# Patient Record
Sex: FEMALE | Race: WHITE | NOT HISPANIC OR LATINO | Employment: OTHER | URBAN - NONMETROPOLITAN AREA
[De-identification: names, ages, dates, MRNs, and addresses within clinical notes are randomized per-mention and may not be internally consistent; named-entity substitution may affect disease eponyms.]

---

## 2017-11-13 ENCOUNTER — APPOINTMENT (OUTPATIENT)
Dept: GENERAL RADIOLOGY | Facility: HOSPITAL | Age: 77
End: 2017-11-13
Attending: EMERGENCY MEDICINE
Payer: MEDICARE

## 2017-11-13 ENCOUNTER — TRANSFERRED RECORDS (OUTPATIENT)
Dept: HEALTH INFORMATION MANAGEMENT | Facility: HOSPITAL | Age: 77
End: 2017-11-13

## 2017-11-13 ENCOUNTER — HOSPITAL ENCOUNTER (EMERGENCY)
Facility: HOSPITAL | Age: 77
Discharge: HOME | End: 2017-11-13
Attending: EMERGENCY MEDICINE | Admitting: EMERGENCY MEDICINE
Payer: MEDICARE

## 2017-11-13 ENCOUNTER — APPOINTMENT (OUTPATIENT)
Dept: CT IMAGING | Facility: HOSPITAL | Age: 77
End: 2017-11-13
Attending: EMERGENCY MEDICINE
Payer: MEDICARE

## 2017-11-13 VITALS
SYSTOLIC BLOOD PRESSURE: 92 MMHG | TEMPERATURE: 97.6 F | RESPIRATION RATE: 17 BRPM | OXYGEN SATURATION: 97 % | HEART RATE: 97 BPM | DIASTOLIC BLOOD PRESSURE: 63 MMHG

## 2017-11-13 DIAGNOSIS — I48.91 ATRIAL FIBRILLATION, UNSPECIFIED TYPE (H): ICD-10-CM
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
## 2017-11-13 LAB
ALBUMIN SERPL-MCNC: 3 G/DL (ref 3.4–5)
ALBUMIN UR-MCNC: NEGATIVE MG/DL
ALP SERPL-CCNC: 83 U/L (ref 40–150)
ALT SERPL W P-5'-P-CCNC: 46 U/L (ref 0–50)
ANION GAP SERPL CALCULATED.3IONS-SCNC: 6 MMOL/L (ref 3–14)
APPEARANCE UR: CLEAR
AST SERPL W P-5'-P-CCNC: 29 U/L (ref 0–45)
BACTERIA #/AREA URNS HPF: ABNORMAL /HPF
BASOPHILS # BLD AUTO: 0 10E9/L (ref 0–0.2)
BASOPHILS NFR BLD AUTO: 0.4 %
BILIRUB SERPL-MCNC: 0.3 MG/DL (ref 0.2–1.3)
BILIRUB UR QL STRIP: NEGATIVE
BUN SERPL-MCNC: 13 MG/DL (ref 7–30)
CALCIUM SERPL-MCNC: 8.3 MG/DL (ref 8.5–10.1)
CHLORIDE SERPL-SCNC: 107 MMOL/L (ref 94–109)
CO2 SERPL-SCNC: 30 MMOL/L (ref 20–32)
COLOR UR AUTO: ABNORMAL
CREAT SERPL-MCNC: 0.76 MG/DL (ref 0.52–1.04)
DIFFERENTIAL METHOD BLD: NORMAL
EOSINOPHIL # BLD AUTO: 0.1 10E9/L (ref 0–0.7)
EOSINOPHIL NFR BLD AUTO: 2.8 %
ERYTHROCYTE [DISTWIDTH] IN BLOOD BY AUTOMATED COUNT: 13.5 % (ref 10–15)
GFR SERPL CREATININE-BSD FRML MDRD: 74 ML/MIN/1.7M2
GLUCOSE SERPL-MCNC: 83 MG/DL (ref 70–99)
GLUCOSE UR STRIP-MCNC: NEGATIVE MG/DL
HCT VFR BLD AUTO: 43 % (ref 35–47)
HGB BLD-MCNC: 14.7 G/DL (ref 11.7–15.7)
HGB UR QL STRIP: ABNORMAL
IMM GRANULOCYTES # BLD: 0 10E9/L (ref 0–0.4)
IMM GRANULOCYTES NFR BLD: 0.2 %
KETONES UR STRIP-MCNC: NEGATIVE MG/DL
LEUKOCYTE ESTERASE UR QL STRIP: NEGATIVE
LYMPHOCYTES # BLD AUTO: 1.6 10E9/L (ref 0.8–5.3)
LYMPHOCYTES NFR BLD AUTO: 32.1 %
MCH RBC QN AUTO: 30.3 PG (ref 26.5–33)
MCHC RBC AUTO-ENTMCNC: 34.2 G/DL (ref 31.5–36.5)
MCV RBC AUTO: 89 FL (ref 78–100)
MONOCYTES # BLD AUTO: 0.3 10E9/L (ref 0–1.3)
MONOCYTES NFR BLD AUTO: 6.7 %
NEUTROPHILS # BLD AUTO: 2.8 10E9/L (ref 1.6–8.3)
NEUTROPHILS NFR BLD AUTO: 57.8 %
NITRATE UR QL: NEGATIVE
NRBC # BLD AUTO: 0 10*3/UL
NRBC BLD AUTO-RTO: 0 /100
PH UR STRIP: 7 PH (ref 4.7–8)
PLATELET # BLD AUTO: 190 10E9/L (ref 150–450)
POTASSIUM SERPL-SCNC: 3.6 MMOL/L (ref 3.4–5.3)
PROT SERPL-MCNC: 7.3 G/DL (ref 6.8–8.8)
RBC # BLD AUTO: 4.85 10E12/L (ref 3.8–5.2)
RBC #/AREA URNS AUTO: 3 /HPF (ref 0–2)
SODIUM SERPL-SCNC: 143 MMOL/L (ref 133–144)
SOURCE: ABNORMAL
SP GR UR STRIP: 1 (ref 1–1.03)
TROPONIN I SERPL-MCNC: <0.015 UG/L (ref 0–0.04)
UROBILINOGEN UR STRIP-MCNC: NORMAL MG/DL (ref 0–2)
WBC # BLD AUTO: 4.9 10E9/L (ref 4–11)
WBC #/AREA URNS AUTO: <1 /HPF (ref 0–2)

## 2017-11-13 PROCEDURE — 73562 X-RAY EXAM OF KNEE 3: CPT | Mod: TC,RT

## 2017-11-13 PROCEDURE — 81001 URINALYSIS AUTO W/SCOPE: CPT | Performed by: EMERGENCY MEDICINE

## 2017-11-13 PROCEDURE — 93010 ELECTROCARDIOGRAM REPORT: CPT | Performed by: INTERNAL MEDICINE

## 2017-11-13 PROCEDURE — 84484 ASSAY OF TROPONIN QUANT: CPT | Performed by: EMERGENCY MEDICINE

## 2017-11-13 PROCEDURE — 93005 ELECTROCARDIOGRAM TRACING: CPT

## 2017-11-13 PROCEDURE — 99285 EMERGENCY DEPT VISIT HI MDM: CPT | Mod: 25

## 2017-11-13 PROCEDURE — 71010 XR CHEST 1 VW: CPT | Mod: TC

## 2017-11-13 PROCEDURE — 80053 COMPREHEN METABOLIC PANEL: CPT | Performed by: EMERGENCY MEDICINE

## 2017-11-13 PROCEDURE — 72170 X-RAY EXAM OF PELVIS: CPT | Mod: TC

## 2017-11-13 PROCEDURE — 70450 CT HEAD/BRAIN W/O DYE: CPT | Mod: TC

## 2017-11-13 PROCEDURE — 85025 COMPLETE CBC W/AUTO DIFF WBC: CPT | Performed by: EMERGENCY MEDICINE

## 2017-11-13 PROCEDURE — 36415 COLL VENOUS BLD VENIPUNCTURE: CPT | Performed by: EMERGENCY MEDICINE

## 2017-11-13 PROCEDURE — 99283 EMERGENCY DEPT VISIT LOW MDM: CPT | Performed by: EMERGENCY MEDICINE

## 2017-11-13 ASSESSMENT — ENCOUNTER SYMPTOMS
ABDOMINAL PAIN: 0
SHORTNESS OF BREATH: 0
FEVER: 0

## 2017-11-13 NOTE — ED AVS SNAPSHOT
[facility]    [address] [place] [address]    Phone:  [phone]                                       Danette Ramos   MRN: [id number]    Department:  [facility]   Date of Visit:  11/13/2017           After Visit Summary Signature Page     I have received my discharge instructions, and my questions have been answered. I have discussed any challenges I see with this plan with the nurse or doctor.    ..........................................................................................................................................  Patient/Patient Representative Signature      ..........................................................................................................................................  Patient Representative Print Name and Relationship to Patient    ..................................................               ................................................  Date                                            Time    ..........................................................................................................................................  Reviewed by Signature/Title    ...................................................              ..............................................  Date                                                            Time

## 2017-11-13 NOTE — ED AVS SNAPSHOT
[facility]    [address]    [place] [address]    Phone:  [phone]                                       Danette Ramos   MRN: [id number]    Department:  [facility]   Date of Visit:  11/13/2017           Patient Information     Date Of Birth          1940        Your diagnoses for this visit were:     Fall, initial encounter     Atrial fibrillation, unspecified type (H)        You were seen by Emile Sutherland MD.      Follow-up Information     Follow up with No Ref-Primary, Physician In 2 days.    Why:  Re-evaluation, Continuity of care    Contact information:    NO REF-PRIMARY PHYSICIAN          Discharge Instructions         Discharge Instructions for Atrial Fibrillation  You have been diagnosed with an abnormal heart rhythm called atrial fibrillation. With this condition, your heart s 2 upper chambers quiver rather than squeeze the blood out in a normal pattern. This leads to an irregular and sometimes rapid heartbeat. Some people will develop associated symptoms such as a flip-flopping heartbeat, chest pain, lightheadedness, or shortness of breath. Other people may have no symptoms at all. Atrial fibrillation is serious because it affects the heart s ability to fill with blood as it should. Blood clots may form. This increases the risk for stroke. Untreated atrial fibrillation can also lead to heart failure. Atrial fibrillation can be controlled. With treatment, most people with atrial fibrillation lead normal lives.  Treatment options  Recommended treatment for atrial fibrillation depends on your age, symptoms, how long you have had atrial fibrillation, and other factors. You will have a complete evaluation to find out if you have any abnormalities that caused your heart to go into atrial fibrillation. This might be blocked heart arteries or a thyroid problem. Your doctor will assess your particular case and discuss choices with you.  Treatment choices may  include:    Treating an underlying disorder that puts you at risk for atrial fibrillation. For example, correcting an abnormal thyroid or electrolyte problem, or treating a blocked heart artery.    Restoring a normal heart rhythm with an electrical shock (cardioversion) or with an antiarrhythmic medicine (chemical cardioversion)    Using medicine to control your heart rate in atrial fibrillation.    Preventing the risk for blood clot and stroke using blood-thinning medicines. Your doctor will tell you what he or she recommends. Choices may include aspirin, clopidogrel, warfarin, dabigatran, rivaroxaban, apixaban, and edoxaban.    Doing catheter ablation or a surgical maze procedure. These use different methods to destroy certain areas of heart tissue. This interrupts the electrical signals causing atrial fibrillation. One of these procedures may be a choice when medicines do not work, or as an alternative to long-term medicine.    Other treatment choices may be recommended for you by your doctor.  Managing risk factors for stroke and preventing heart failure are important parts of any treatment plan for atrial fibrillation.  Home care    Take your medicines exactly as directed. Don t skip doses.    Work with your doctor to find the right medicines and doses for you.    Learn to take your own pulse. Keep a record of your results. Ask your doctor which pulse rates mean that you need medical attention. Slowing your pulse is often the goal of treatment. Ask your doctor if it s OK for you to use an automatic machine to check your pulse at home. Sometimes these machines don t count the pulse correctly when you have atrial fibrillation.    Limit your intake of coffee, tea, cola, and other beverages with caffeine. Talk with your doctor about whether you should eliminate caffeine.    Avoid over-the-counter medicines that have caffeine in them.    Let your doctor know what medicines you take, including prescription and  over-the-counter medicines, as well as any supplements. They interfere with some medicines given for atrial fibrillation.    Ask your doctor about whether you can drink alcohol. Some people need to avoid alcohol to better treat atrial fibrillation. If you are taking blood-thinner medicines, alcohol may interfere with them by increasing their effect.    Never take stimulants such as amphetamines or cocaine. These drugs can speed up your heart rate and trigger atrial fibrillation.  Follow-up care  Follow up with your doctor, or as advised.     When should I call my healthcare provider  Call your healthcare provider right away if you have any of the following:    Weakness    Dizziness    Fainting    Fatigue    Shortness of breath    Chest pain with increased activity    A change in the usual regularity of your heartbeat, or an unusually fast heartbeat   Date Last Reviewed: 4/23/2016 2000-2017 [employer]. [address] [place] [address], [place] [address]. All rights reserved. This information is not intended as a substitute for professional medical care. Always follow your healthcare professional's instructions.          Preventing Falls: Are You At Risk of Falling?     Ask for help to reduce risk of falling in your home.     As you get older, you're not as steady on your feet as you once were. And you may have health problems you didn't have when you were younger. So, it's not surprising that older people are more likely to trip and fall. Falling can be very serious. It can change your overall health and quality of life. That's why it's important to be aware of your own risk of falling.  The dangers of falling  Falls are one of the main causes of injury in people over age 65. An older person who falls may take longer to get better than a younger person. And, after a fall, an older person is more likely to have problems that don't go away. So, preventing falls can help you avoid serious health problems.  Are  "you at risk of falling?  Answer these questions to rate your level of risk.    Are you a woman?    Have you fallen or stumbled in the last year?    Are you over age 65?    Are you ever dizzy or lightheaded with standing?    Do you have a hard time getting in and out of the bathtub or on and off the toilet?    Do you lean on objects to help you get around? Or do you use a cane or walker?    Do you have vision or hearing problems? For example, do you need new glasses or hearing aids?    Do you have 2 or more long-lasting (chronic) medical conditions?    Do you take 3 or more medicines?    Have you felt depressed recently?    Have you had more trouble with your memory in recent months?    Are there hazards in your home that might cause you to fall, such as loose rugs or poor lighting?    Do you have a pet that jumps on you or might trip you?    Have you stopped getting regular exercise?    Do you have diabetes?     Do you have a neurologic disease, such as Parkinson or Alzheimer disease?     Do you drink alcohol?    Do you wear athletic shoes or slippers, or go barefoot at home?  You can help prevent falls  If you answered \"yes\" to any of the above questions, take steps to reduce your risk of a fall. Monitoring health conditions and keeping walkways in your home free of clutter are just two ways. Changing is sometimes easier said than done. But keep in mind that even small changes can make you less likely to fall.  The fear of falling  It's normal to be scared of falling, especially if you've fallen before. But being afraid can actually make you more likely to fall. This is because:    Fear might cause you to become less active. Being less active can lead to a loss of strength and balance.    Fear can lead to isolation from others, depression, or the use of more medicines or alcohol. And all these things make falling even more likely.  To break the cycle, learn more about ways to avoid falling. As you take control, you " "may find yourself feeling less afraid.   Date Last Reviewed: 5/1/2017 2000-2017 [employer]. [address] [place] [address], [place] [address]. All rights reserved. This information is not intended as a substitute for professional medical care. Always follow your healthcare professional's instructions.             Review of your medicines      Notice     You have not been prescribed any medications.            Procedures and tests performed during your visit     CBC with platelets differential    Comprehensive metabolic panel    EKG 12 lead    Head CT w/o contrast    Pelvis XR, 1-2 views    Troponin I    UA reflex to Microscopic and Culture    XR Chest 1 View    XR Knee Right 3 Views      Orders Needing Specimen Collection     None      Pending Results     Date and Time Order Name Status Description    11/13/2017 0447 XR Chest 1 View In process     11/13/2017 0447 Head CT w/o contrast In process     11/13/2017 0446 XR Knee Right 3 Views In process     11/13/2017 0446 Pelvis XR, 1-2 views In process             Pending Culture Results     No orders found from 11/11/2017 to 11/14/2017.            Thank you for choosing [facility]       Thank you for choosing [facility] for your care. Our goal is always to provide you with excellent care. Hearing back from our patients is one way we can continue to improve our services. Please take a few minutes to complete the written survey that you may receive in the mail after you visit with us. Thank you!        [employer] Information     [employer] lets you send messages to your doctor, view your test results, renew your prescriptions, schedule appointments and more. To sign up, go to [email or web address] . Click on \"Log in\" on the left side of the screen, which will take you to the Welcome page. Then click on \"Sign up Now\" on the right side of the page.     You will be asked to enter the access code listed below, as well as some personal information. Please follow the " directions to create your username and password.     Your access code is: [email or web address]  Expires: 2018  7:21 AM     Your access code will  in 90 days. If you need help or a new code, please call your [facility] clinic or [phone].        Care EveryWhere ID     This is your Care EveryWhere ID. This could be used by other organizations to access your [facility] medical records  [id number]        Equal Access to Services     DIAN MARCUS : Hadii aad ku hadasho Soomaali, waaxda luqadaha, qaybta kaalmada adeegyada, carter yost . So [facility] [phone].    ATENCIÓN: Si habla español, tiene a prado disposición servicios gratuitos de asistencia lingüística. Llame al [phone].    We comply with applicable federal civil rights laws and [place] laws. We do not discriminate on the basis of race, color, national origin, age, disability, sex, sexual orientation, or gender identity.            After Visit Summary       This is your record. Keep this with you and show to your community pharmacist(s) and doctor(s) at your next visit.

## 2017-11-13 NOTE — ED NOTES
Arrived to ED room 4 via [facility]. EMS reports patient was found on the floor next to her bed around 0400 and was not seen prior to that since 2200 so unsure how long she was on the floor. Patient denies any pain. 4 cm diameter red non blanchable area on right knee. Hx of dementia and stroke. EMS reports patient is responding per her usual. Call light within reach.

## 2017-11-13 NOTE — ED PROVIDER NOTES
History     Chief Complaint   Patient presents with     Fall     found on floor at 0400, last known well time 2200     HPI  Danette Ramos is a 77 year old female who Presents to the ED via EMS.  Patient was apparently found on the floor next to her bed.  She was last seen at 10:00 tonight.  She does not recall what happened.  Presumed that patient had fallen to the floor for unknown duration.  She denies pain anywhere on the body.  She has history of dementia and prior stroke.  No other complaints today.    Problem List:    There are no active problems to display for this patient.       Past Medical History:    No past medical history on file.    Past Surgical History:    No past surgical history on file.    Family History:    No family history on file.    Social History:  Marital Status:   [2]  Social History   Substance Use Topics     Smoking status: Not on file     Smokeless tobacco: Not on file     Alcohol use Not on file        Medications:      No current outpatient prescriptions on file.      Review of Systems   Constitutional: Negative for fever.   Respiratory: Negative for shortness of breath.    Cardiovascular: Negative for chest pain.   Gastrointestinal: Negative for abdominal pain.   All other systems reviewed and are negative.      Physical Exam   BP: 118/91  Pulse: 97  Heart Rate: 92  Temp: 96.2  F (35.7  C)  Resp: 17  SpO2: 96 %      Physical Exam   Constitutional: She appears well-developed and well-nourished. No distress.   HENT:   Head: Normocephalic and atraumatic.   Mouth/Throat: Oropharynx is clear and moist. No oropharyngeal exudate.   Eyes: Pupils are equal, round, and reactive to light. No scleral icterus.   Cardiovascular: Normal rate, regular rhythm, normal heart sounds and intact distal pulses.    Pulmonary/Chest: Effort normal and breath sounds normal. No respiratory distress.   Abdominal: Soft. Bowel sounds are normal. There is no tenderness.   Musculoskeletal: She exhibits no  edema or tenderness.   Neurological: She is alert.   Skin: Skin is warm. No rash noted. She is not diaphoretic.   Nursing note and vitals reviewed.      ED Course   Patient evaluated and laboratory tests ordered.  X-ray of the pelvis, right knee ordered.  CT head ordered.  Chest x-ray ordered.    ED Course     Procedures               EKG Interpretation:      Interpreted by Emile Sutherland  Time reviewed: 5:00 AM  Symptoms at time of EKG: none   Rhythm: a' fib   Rate: 118  Axis: normal  Ectopy: none  Conduction: normal  ST Segments/ T Waves: No ST-T wave changes  Q Waves: none  Comparison to prior: No old EKG available    Clinical Impression: normal EKG               Labs Ordered and Resulted from Time of ED Arrival Up to the Time of Departure from the ED   UA MACROSCOPIC WITH REFLEX TO MICRO AND CULTURE - Abnormal; Notable for the following:        Result Value    Blood Urine Trace (*)     RBC Urine 3 (*)     Bacteria Urine None (*)     All other components within normal limits   COMPREHENSIVE METABOLIC PANEL - Abnormal; Notable for the following:     Calcium 8.3 (*)     Albumin 3.0 (*)     All other components within normal limits   CBC WITH PLATELETS DIFFERENTIAL   TROPONIN I       Assessments & Plan (with Medical Decision Making)   Fall initial encounter: Normal view of the emergency department and CT scan shows an old infarct.  No acute changes on chest x-ray, right knee x-ray and pelvic x-ray. EKG showed atrial fibrillation which is not documented in the patient's chart.  Normal CBC, CMP and a urinalysis shows trace blood.  In view of no changes on CT scan and normal evaluation of the ED patient was discharged back to the group home.  Advised follow-up with PCP.    I have reviewed the nursing notes.    I have reviewed the findings, diagnosis, plan and need for follow up with the patient.    New Prescriptions    No medications on file       Final diagnoses:   Fall, initial encounter   Atrial fibrillation,  unspecified type (H)       11/13/2017   [facility]     Emile Sutherland MD  11/13/17 0725       Emile Sutherland MD  11/13/17 0725

## 2017-11-13 NOTE — PROGRESS NOTES
Head CT w/o contrast -  IMPRESSION: Chronic appearing infarct involving the left occipital and left parietal lobe with some associated dilatation of the ventricle. There is also generalized atrophy with small vessel ischemic change but no acute mass effect or hemorrhage.- discharged back to the group home.  Advised follow-up with PCP.  Report routed to PCP, Dr. ASHLEY Taylor.

## 2017-11-13 NOTE — DISCHARGE INSTRUCTIONS
Discharge Instructions for Atrial Fibrillation  You have been diagnosed with an abnormal heart rhythm called atrial fibrillation. With this condition, your heart s 2 upper chambers quiver rather than squeeze the blood out in a normal pattern. This leads to an irregular and sometimes rapid heartbeat. Some people will develop associated symptoms such as a flip-flopping heartbeat, chest pain, lightheadedness, or shortness of breath. Other people may have no symptoms at all. Atrial fibrillation is serious because it affects the heart s ability to fill with blood as it should. Blood clots may form. This increases the risk for stroke. Untreated atrial fibrillation can also lead to heart failure. Atrial fibrillation can be controlled. With treatment, most people with atrial fibrillation lead normal lives.  Treatment options  Recommended treatment for atrial fibrillation depends on your age, symptoms, how long you have had atrial fibrillation, and other factors. You will have a complete evaluation to find out if you have any abnormalities that caused your heart to go into atrial fibrillation. This might be blocked heart arteries or a thyroid problem. Your doctor will assess your particular case and discuss choices with you.  Treatment choices may include:    Treating an underlying disorder that puts you at risk for atrial fibrillation. For example, correcting an abnormal thyroid or electrolyte problem, or treating a blocked heart artery.    Restoring a normal heart rhythm with an electrical shock (cardioversion) or with an antiarrhythmic medicine (chemical cardioversion)    Using medicine to control your heart rate in atrial fibrillation.    Preventing the risk for blood clot and stroke using blood-thinning medicines. Your doctor will tell you what he or she recommends. Choices may include aspirin, clopidogrel, warfarin, dabigatran, rivaroxaban, apixaban, and edoxaban.    Doing catheter ablation or a surgical maze  procedure. These use different methods to destroy certain areas of heart tissue. This interrupts the electrical signals causing atrial fibrillation. One of these procedures may be a choice when medicines do not work, or as an alternative to long-term medicine.    Other treatment choices may be recommended for you by your doctor.  Managing risk factors for stroke and preventing heart failure are important parts of any treatment plan for atrial fibrillation.  Home care    Take your medicines exactly as directed. Don t skip doses.    Work with your doctor to find the right medicines and doses for you.    Learn to take your own pulse. Keep a record of your results. Ask your doctor which pulse rates mean that you need medical attention. Slowing your pulse is often the goal of treatment. Ask your doctor if it s OK for you to use an automatic machine to check your pulse at home. Sometimes these machines don t count the pulse correctly when you have atrial fibrillation.    Limit your intake of coffee, tea, cola, and other beverages with caffeine. Talk with your doctor about whether you should eliminate caffeine.    Avoid over-the-counter medicines that have caffeine in them.    Let your doctor know what medicines you take, including prescription and over-the-counter medicines, as well as any supplements. They interfere with some medicines given for atrial fibrillation.    Ask your doctor about whether you can drink alcohol. Some people need to avoid alcohol to better treat atrial fibrillation. If you are taking blood-thinner medicines, alcohol may interfere with them by increasing their effect.    Never take stimulants such as amphetamines or cocaine. These drugs can speed up your heart rate and trigger atrial fibrillation.  Follow-up care  Follow up with your doctor, or as advised.     When should I call my healthcare provider  Call your healthcare provider right away if you have any of the  following:    Weakness    Dizziness    Fainting    Fatigue    Shortness of breath    Chest pain with increased activity    A change in the usual regularity of your heartbeat, or an unusually fast heartbeat   Date Last Reviewed: 4/23/2016 2000-2017 [employer]. [address] [place] [address], [place] [address]. All rights reserved. This information is not intended as a substitute for professional medical care. Always follow your healthcare professional's instructions.          Preventing Falls: Are You At Risk of Falling?     Ask for help to reduce risk of falling in your home.     As you get older, you're not as steady on your feet as you once were. And you may have health problems you didn't have when you were younger. So, it's not surprising that older people are more likely to trip and fall. Falling can be very serious. It can change your overall health and quality of life. That's why it's important to be aware of your own risk of falling.  The dangers of falling  Falls are one of the main causes of injury in people over age 65. An older person who falls may take longer to get better than a younger person. And, after a fall, an older person is more likely to have problems that don't go away. So, preventing falls can help you avoid serious health problems.  Are you at risk of falling?  Answer these questions to rate your level of risk.    Are you a woman?    Have you fallen or stumbled in the last year?    Are you over age 65?    Are you ever dizzy or lightheaded with standing?    Do you have a hard time getting in and out of the bathtub or on and off the toilet?    Do you lean on objects to help you get around? Or do you use a cane or walker?    Do you have vision or hearing problems? For example, do you need new glasses or hearing aids?    Do you have 2 or more long-lasting (chronic) medical conditions?    Do you take 3 or more medicines?    Have you felt depressed recently?    Have you had more trouble  "with your memory in recent months?    Are there hazards in your home that might cause you to fall, such as loose rugs or poor lighting?    Do you have a pet that jumps on you or might trip you?    Have you stopped getting regular exercise?    Do you have diabetes?     Do you have a neurologic disease, such as Parkinson or Alzheimer disease?     Do you drink alcohol?    Do you wear athletic shoes or slippers, or go barefoot at home?  You can help prevent falls  If you answered \"yes\" to any of the above questions, take steps to reduce your risk of a fall. Monitoring health conditions and keeping walkways in your home free of clutter are just two ways. Changing is sometimes easier said than done. But keep in mind that even small changes can make you less likely to fall.  The fear of falling  It's normal to be scared of falling, especially if you've fallen before. But being afraid can actually make you more likely to fall. This is because:    Fear might cause you to become less active. Being less active can lead to a loss of strength and balance.    Fear can lead to isolation from others, depression, or the use of more medicines or alcohol. And all these things make falling even more likely.  To break the cycle, learn more about ways to avoid falling. As you take control, you may find yourself feeling less afraid.   Date Last Reviewed: 5/1/2017 2000-2017 [employer]. [address] [place] [address], [place] [address]. All rights reserved. This information is not intended as a substitute for professional medical care. Always follow your healthcare professional's instructions.        "

## 2017-11-24 ENCOUNTER — APPOINTMENT (OUTPATIENT)
Dept: CT IMAGING | Facility: HOSPITAL | Age: 77
End: 2017-11-24
Attending: FAMILY MEDICINE
Payer: MEDICARE

## 2017-11-24 ENCOUNTER — APPOINTMENT (OUTPATIENT)
Dept: GENERAL RADIOLOGY | Facility: HOSPITAL | Age: 77
End: 2017-11-24
Attending: FAMILY MEDICINE
Payer: MEDICARE

## 2017-11-24 ENCOUNTER — HOSPITAL ENCOUNTER (EMERGENCY)
Facility: HOSPITAL | Age: 77
Discharge: HOME | End: 2017-11-24
Attending: FAMILY MEDICINE | Admitting: FAMILY MEDICINE
Payer: MEDICARE

## 2017-11-24 ENCOUNTER — MEDICAL CORRESPONDENCE (OUTPATIENT)
Dept: HEALTH INFORMATION MANAGEMENT | Facility: HOSPITAL | Age: 77
End: 2017-11-24

## 2017-11-24 VITALS
OXYGEN SATURATION: 96 % | TEMPERATURE: 98 F | DIASTOLIC BLOOD PRESSURE: 94 MMHG | SYSTOLIC BLOOD PRESSURE: 150 MMHG | RESPIRATION RATE: 16 BRPM | HEART RATE: 64 BPM

## 2017-11-24 DIAGNOSIS — I21.9 ACUTE MYOCARDIAL INFARCTION, INITIAL EPISODE OF CARE (H): ICD-10-CM

## 2017-11-24 DIAGNOSIS — F03.91 DEMENTIA WITH BEHAVIORAL DISTURBANCE, UNSPECIFIED DEMENTIA TYPE: ICD-10-CM

## 2017-11-24 LAB
ALBUMIN UR-MCNC: NEGATIVE MG/DL
ANION GAP SERPL CALCULATED.3IONS-SCNC: 7 MMOL/L (ref 3–14)
APPEARANCE UR: CLEAR
BACTERIA #/AREA URNS HPF: ABNORMAL /HPF
BASOPHILS # BLD AUTO: 0 10E9/L (ref 0–0.2)
BASOPHILS NFR BLD AUTO: 0.3 %
BILIRUB UR QL STRIP: NEGATIVE
BUN SERPL-MCNC: 16 MG/DL (ref 7–30)
CALCIUM SERPL-MCNC: 8 MG/DL (ref 8.5–10.1)
CHLORIDE SERPL-SCNC: 108 MMOL/L (ref 94–109)
CO2 SERPL-SCNC: 27 MMOL/L (ref 20–32)
COLOR UR AUTO: YELLOW
CREAT SERPL-MCNC: 0.66 MG/DL (ref 0.52–1.04)
DIFFERENTIAL METHOD BLD: NORMAL
EOSINOPHIL # BLD AUTO: 0.1 10E9/L (ref 0–0.7)
EOSINOPHIL NFR BLD AUTO: 1.8 %
ERYTHROCYTE [DISTWIDTH] IN BLOOD BY AUTOMATED COUNT: 13.9 % (ref 10–15)
GFR SERPL CREATININE-BSD FRML MDRD: 87 ML/MIN/1.7M2
GLUCOSE SERPL-MCNC: 86 MG/DL (ref 70–99)
GLUCOSE UR STRIP-MCNC: NEGATIVE MG/DL
HCT VFR BLD AUTO: 40.6 % (ref 35–47)
HGB BLD-MCNC: 13.8 G/DL (ref 11.7–15.7)
HGB UR QL STRIP: ABNORMAL
IMM GRANULOCYTES # BLD: 0 10E9/L (ref 0–0.4)
IMM GRANULOCYTES NFR BLD: 0.3 %
INR PPP: 2.49 (ref 0.8–1.2)
KETONES UR STRIP-MCNC: NEGATIVE MG/DL
LEUKOCYTE ESTERASE UR QL STRIP: NEGATIVE
LYMPHOCYTES # BLD AUTO: 1.1 10E9/L (ref 0.8–5.3)
LYMPHOCYTES NFR BLD AUTO: 16.8 %
MCH RBC QN AUTO: 30.3 PG (ref 26.5–33)
MCHC RBC AUTO-ENTMCNC: 34 G/DL (ref 31.5–36.5)
MCV RBC AUTO: 89 FL (ref 78–100)
MONOCYTES # BLD AUTO: 0.5 10E9/L (ref 0–1.3)
MONOCYTES NFR BLD AUTO: 7.4 %
MUCOUS THREADS #/AREA URNS LPF: PRESENT /LPF
NEUTROPHILS # BLD AUTO: 5 10E9/L (ref 1.6–8.3)
NEUTROPHILS NFR BLD AUTO: 73.4 %
NITRATE UR QL: NEGATIVE
NRBC # BLD AUTO: 0 10*3/UL
NRBC BLD AUTO-RTO: 0 /100
PH UR STRIP: 7 PH (ref 4.7–8)
PLATELET # BLD AUTO: 177 10E9/L (ref 150–450)
POTASSIUM SERPL-SCNC: 3.5 MMOL/L (ref 3.4–5.3)
RBC # BLD AUTO: 4.56 10E12/L (ref 3.8–5.2)
RBC #/AREA URNS AUTO: 32 /HPF (ref 0–2)
SODIUM SERPL-SCNC: 142 MMOL/L (ref 133–144)
SOURCE: ABNORMAL
SP GR UR STRIP: 1.02 (ref 1–1.03)
TROPONIN I SERPL-MCNC: 0.06 UG/L (ref 0–0.04)
TROPONIN I SERPL-MCNC: 0.16 UG/L (ref 0–0.04)
UROBILINOGEN UR STRIP-MCNC: NORMAL MG/DL (ref 0–2)
WBC # BLD AUTO: 6.8 10E9/L (ref 4–11)
WBC #/AREA URNS AUTO: 1 /HPF (ref 0–2)

## 2017-11-24 PROCEDURE — 70450 CT HEAD/BRAIN W/O DYE: CPT | Mod: TC

## 2017-11-24 PROCEDURE — 81001 URINALYSIS AUTO W/SCOPE: CPT | Performed by: FAMILY MEDICINE

## 2017-11-24 PROCEDURE — 99285 EMERGENCY DEPT VISIT HI MDM: CPT | Mod: 25

## 2017-11-24 PROCEDURE — 93005 ELECTROCARDIOGRAM TRACING: CPT

## 2017-11-24 PROCEDURE — 93010 ELECTROCARDIOGRAM REPORT: CPT | Mod: 76 | Performed by: INTERNAL MEDICINE

## 2017-11-24 PROCEDURE — 80048 BASIC METABOLIC PNL TOTAL CA: CPT | Performed by: FAMILY MEDICINE

## 2017-11-24 PROCEDURE — 85025 COMPLETE CBC W/AUTO DIFF WBC: CPT | Performed by: FAMILY MEDICINE

## 2017-11-24 PROCEDURE — 84484 ASSAY OF TROPONIN QUANT: CPT | Performed by: FAMILY MEDICINE

## 2017-11-24 PROCEDURE — 85610 PROTHROMBIN TIME: CPT | Performed by: FAMILY MEDICINE

## 2017-11-24 PROCEDURE — 73630 X-RAY EXAM OF FOOT: CPT | Mod: TC,LT

## 2017-11-24 PROCEDURE — 71010 XR CHEST PORT 1 VW: CPT | Mod: TC

## 2017-11-24 PROCEDURE — 72125 CT NECK SPINE W/O DYE: CPT | Mod: TC

## 2017-11-24 PROCEDURE — 36415 COLL VENOUS BLD VENIPUNCTURE: CPT | Performed by: FAMILY MEDICINE

## 2017-11-24 PROCEDURE — 99285 EMERGENCY DEPT VISIT HI MDM: CPT | Performed by: FAMILY MEDICINE

## 2017-11-24 RX ORDER — AMOXICILLIN 250 MG
2 CAPSULE ORAL DAILY
COMMUNITY

## 2017-11-24 NOTE — ED NOTES
Patient repositioned in bed,   Denies the need to have to use the bathroom  Call light in hand and TV on for patient

## 2017-11-24 NOTE — DISCHARGE INSTRUCTIONS
After discussion with family, social work, her residence and , the patient will be returning to [facility] with a consult for Hospice.  Yin Balderas is confirming consult.

## 2017-11-24 NOTE — ED NOTES
Spoke with Brian, pts son. Brian states that pts  is currently having a dementia screen but is legally pts decision maker. Brian will make some phone calls regarding what the family's wishes are for pts continued care.

## 2017-11-24 NOTE — ED AVS SNAPSHOT
[facility]    [address] [place] [id number]    Phone:  [phone]                                       Danette Ramos   MRN: [id number]    Department:  [facility]   Date of Visit:  11/24/2017           After Visit Summary Signature Page     I have received my discharge instructions, and my questions have been answered. I have discussed any challenges I see with this plan with the nurse or doctor.    ..........................................................................................................................................  Patient/Patient Representative Signature      ..........................................................................................................................................  Patient Representative Print Name and Relationship to Patient    ..................................................               ................................................  Date                                            Time    ..........................................................................................................................................  Reviewed by Signature/Title    ...................................................              ..............................................  Date                                                            Time

## 2017-11-24 NOTE — PROGRESS NOTES
Referral by ED doctor who is wanting to determine best discharge disposition for patient. Concern at this time is the needs and care level at the assisted living.  She is having per MD an MI and has had several falls at the facility. The primary MD does not believe there is a reason to admit if there is no plan for any intervention.  Patient is a DNR/DNI.  I did speak with the daughter as the  is hard of hearing.  She is indicating they are focused on comfort versus going through multiple interventions.  She had a massive stroke a while ago that has left her quite diminished and the last couple of months she has done more poorly.  Will investigate any other care facilities with another level of care to discuss options.  They are also interested in hospice.

## 2017-11-24 NOTE — ED NOTES
Discharge to [facility]. Will reviewed discharge instructions with Mitchell Johnson at [facility].  Patient in  , [facility] to transport back to home

## 2017-11-24 NOTE — ED NOTES
"C/o \"neck pain, hit head while getting out of bed\" Cervical collar applied.  Had arrived via ambulance from [facility] in [place] for falling out of bed and getting abrasion on left foot.    "

## 2017-11-24 NOTE — ED NOTES
Pt has used the bed pan and urine has been sent to lab.    Pt is now sitting up in the bed watching a tv program of her choice    Pt was given options for breakfast she picked out what she wanted to eat    Pt breakfast was ordered

## 2017-11-24 NOTE — ED PROVIDER NOTES
"  History     Chief Complaint   Patient presents with     Fall     fell out of bed. pain to left foot. pt states she hit her head but staff at NH denies. pt takes coumadin     HPI  Danette Ramos is a 77 year old female who presents from nursing home after unwitnessed fall out of bed . Patient initially acknowled left foot pain but later stated some neck pain but then later declined. Patient is chronically anticoagulated . Patient has chronic dementia so history is difficult.  Patient currently denies having any pain .      Problem List:    There are no active problems to display for this patient.       Past Medical History:    No past medical history on file.    Past Surgical History:    No past surgical history on file.    Family History:    No family history on file.    Social History:  Marital Status:   [2]  Social History   Substance Use Topics     Smoking status: Not on file     Smokeless tobacco: Not on file     Alcohol use Not on file        Medications:      Docusate Sodium (COLACE PO)   Warfarin Sodium (COUMADIN PO)   Warfarin Sodium (COUMADIN PO)   psyllium (KONSYL) 100 % POWD   LEVETIRACETAM PO   Atorvastatin Calcium (LIPITOR PO)   PREDNISONE PO   RisperiDONE (RISPERDAL PO)   SERTRALINE HCL PO   VITAMIN D, CHOLECALCIFEROL, PO   Acetaminophen (TYLENOL PO)   Docusate Sodium (COLACE PO)   senna-docusate (SENOKOT-S;PERICOLACE) 8.6-50 MG per tablet         Review of Systems   Unable to perform ROS: Dementia       Physical Exam   BP: 150/73  Pulse: 64  Heart Rate: 66  Temp: 97.8  F (36.6  C)  Resp: 18  Weight:  (\"patient doesn't know\")  SpO2: 94 %      Physical Exam   Constitutional: She appears well-developed and well-nourished.   HENT:   Head: Normocephalic and atraumatic.   Mouth/Throat: Oropharynx is clear and moist.   Eyes: Pupils are equal, round, and reactive to light. Right eye exhibits no discharge.   Neck: Normal range of motion. Neck supple. No JVD present. No tracheal deviation present. No " thyromegaly present.   Cardiovascular: Normal rate, regular rhythm and normal heart sounds.  Exam reveals no gallop and no friction rub.    No murmur heard.  Pulmonary/Chest: Effort normal and breath sounds normal. No stridor. No respiratory distress. She has no wheezes. She has no rales. She exhibits no tenderness.   Abdominal: Soft. Bowel sounds are normal. She exhibits no distension and no mass. There is no tenderness. There is no rebound and no guarding.   Musculoskeletal:   Left lower extremity with abrasions and petechia .    Lymphadenopathy:     She has no cervical adenopathy.   Neurological: She is alert.   Patient oriented times 1    Skin: Skin is warm and dry.   Psychiatric:   Patient agitated slightly combative    Nursing note and vitals reviewed.      ED Course     ED Course     Procedures          Patient arrived to ED via EMS after having a fall at her assisted living. This is patients second ER visit for fall within the last 9 days . Vitals reviewed. Patient without any complaint of pain upon arrival. Labs and diagnostics ordered. Cardiac monitors placed but patient refused. EKG without significant findings. INitial troponin I .055. Repeat troponin I increased again at .156. Remainder labs reassuring. Urinalysis pending at the time of this progress note. Patient with stable vital signs but mental status does not seem to be able to support her current living situation. Discussed patient diagnosis with family . INterventional cardiology services declined at this time. Discussed patient with Dr Josy Pizarro.  Decision at this time either to admit or discharge to alternative living situation or current situation with increased services.   Labs Ordered and Resulted from Time of ED Arrival Up to the Time of Departure from the ED   INR - Abnormal; Notable for the following:        Result Value    INR 2.49 (*)     All other components within normal limits   TROPONIN I - Abnormal; Notable for the following:      Troponin I ES 0.055 (*)     All other components within normal limits   BASIC METABOLIC PANEL - Abnormal; Notable for the following:     Calcium 8.0 (*)     All other components within normal limits   TROPONIN I - Abnormal; Notable for the following:     Troponin I ES 0.156 (*)     All other components within normal limits   UA MACROSCOPIC WITH REFLEX TO MICRO AND CULTURE - Abnormal; Notable for the following:     Blood Urine Trace (*)     RBC Urine 32 (*)     Bacteria Urine None (*)     Mucous Urine Present (*)     All other components within normal limits   CBC WITH PLATELETS DIFFERENTIAL   CARDIAC CONTINUOUS MONITORING       Assessments & Plan (with Medical Decision Making)     I have reviewed the nursing notes.    I have reviewed the findings, diagnosis, plan and need for follow up with the patient.  No diagnosis found.      Discharge Medication List as of 11/24/2017 11:29 AM          Acute MI Transition care to Dr Vieyra Currently in process of disposition planning with social service     11/24/2017   [facility]     Brenda Vieyra MD  11/24/17 [id number]

## 2017-11-24 NOTE — ED AVS SNAPSHOT
[facility]    [address]    [place] [address]    Phone:  [phone]                                       Danette Ramos   MRN: [id number]    Department:  [facility]   Date of Visit:  11/24/2017           Patient Information     Date Of Birth          1940        Your diagnoses for this visit were:     Acute myocardial infarction, initial episode of care     Dementia with behavioral disturbance, unspecified dementia type        You were seen by Brenda Vieyra MD.        Discharge Instructions       After discussion with family, social work, her residence and , the patient will be returning to [facility] with a consult for Hospice.  Yin Balderas is confirming consult.    ED Discharge Orders     HOSPICE REFERRAL       **Order classes of: [facility], [facility] and [facility] will route to the Home Care and Hospice Referral Pool.  Home Care or Hospice will then contact the patient to schedule their appointment.**    Hospice eligibility overview: prognosis of 6 months or less, end stage disease, patient goals are comfort only, patient is not seeking curative treatment.    If you do not hear from Hospice, or you would like to call to schedule, please call the referring place of service that your provider has listed below.  ______________________________________________________________________    Your provider has referred you to: [facility] [place] [phone]   [email or web address]    Anticipated discharge date 11/. Homecare to begin within 48 hours of discharge.  PLEASE Schedule Hospice consult for 24 - 48 hours.  Please call if there is need for a variance to this timeline.    REASON FOR REFERRAL: Hospice Diagnosis: dementia, MI    ADDITIONAL SERVICES NEEDED:       OTHER PERTINENT INFORMATION: Patient was last seen by provider on 11/24/17 for dementia, MI, altered mental status.  Factors  that indicate prognosis of less than 6 months:  Functional decline: dementia, cardiac event    Current Outpatient Prescriptions:  Docusate Sodium (COLACE PO), Take 100 mg by mouth daily, Disp: , Rfl:   Warfarin Sodium (COUMADIN PO), Take 2.5 mg by mouth On Mondays, Wed, and Fri, Disp: , Rfl:   Warfarin Sodium (COUMADIN PO), Take 5 mg by mouth On Tues, Thurs, Sat and Sundays., Disp: , Rfl:   psyllium (KONSYL) 100 % POWD, Take by mouth daily, Disp: , Rfl:   LEVETIRACETAM PO, Take 750 mg by mouth 2 times daily, Disp: , Rfl:   Atorvastatin Calcium (LIPITOR PO), Take 40 mg by mouth daily, Disp: , Rfl:   PREDNISONE PO, Take 5 mg by mouth daily, Disp: , Rfl:   RisperiDONE (RISPERDAL PO), Take 0.25 mg by mouth 2 times daily, Disp: , Rfl:   SERTRALINE HCL PO, Take 25 mg by mouth daily, Disp: , Rfl:   VITAMIN D, CHOLECALCIFEROL, PO, Take 2,000 Units by mouth daily, Disp: , Rfl:   Acetaminophen (TYLENOL PO), Take 650 mg by mouth every 6 hours as needed for mild pain or fever, Disp: , Rfl:   Docusate Sodium (COLACE PO), Take 100 mg by mouth as needed for constipation, Disp: , Rfl:   senna-docusate (SENOKOT-S;PERICOLACE) 8.6-50 MG per tablet, Take 1 tablet by mouth as needed for constipation, Disp: , Rfl:       There is no problem list on file for this patient.    This patient is under my care, and I have initiated the establishment of the plan of care. This patient will be followed by a physician who will periodically review the plan of care.    Physician/Provider to provide follow up care: Crow Taylor [place] certified Physician at time of discharge: Dr. Vieyra    Please be aware that coverage of these services is subject to the terms and limitations of your health insurance plan.  Call member services at your health plan with any benefit or coverage questions.                     Review of your medicines      Our records show that you are taking the medicines listed below. If these are incorrect, please call  your family doctor or clinic.        Dose / Directions Last dose taken    * COLACE PO   Dose:  100 mg        Take 100 mg by mouth daily   Refills:  0        * COLACE PO   Dose:  100 mg        Take 100 mg by mouth as needed for constipation   Refills:  0        * COUMADIN PO   Dose:  2.5 mg        Take 2.5 mg by mouth On Mondays, Wed, and Fri   Refills:  0        * COUMADIN PO   Dose:  5 mg        Take 5 mg by mouth On Tues, Thurs, Sat and Sundays.   Refills:  0        KONSYL 100 % Powd   Generic drug:  psyllium        Take by mouth daily   Refills:  0        LEVETIRACETAM PO   Dose:  750 mg        Take 750 mg by mouth 2 times daily   Refills:  0        LIPITOR PO   Dose:  40 mg        Take 40 mg by mouth daily   Refills:  0        PREDNISONE PO   Dose:  5 mg        Take 5 mg by mouth daily   Refills:  0        RISPERDAL PO   Dose:  0.25 mg        Take 0.25 mg by mouth 2 times daily   Refills:  0        senna-docusate 8.6-50 MG per tablet   Commonly known as:  SENOKOT-S;PERICOLACE   Dose:  1 tablet        Take 1 tablet by mouth as needed for constipation   Refills:  0        SERTRALINE HCL PO   Dose:  25 mg        Take 25 mg by mouth daily   Refills:  0        TYLENOL PO   Dose:  650 mg        Take 650 mg by mouth every 6 hours as needed for mild pain or fever   Refills:  0        VITAMIN D (CHOLECALCIFEROL) PO   Dose:  2000 Units        Take 2,000 Units by mouth daily   Refills:  0        * Notice:  This list has 4 medication(s) that are the same as other medications prescribed for you. Read the directions carefully, and ask your doctor or other care provider to review them with you.            Procedures and tests performed during your visit     Procedure/Test Number of Times Performed    Basic metabolic panel 1    CBC with platelets differential 1    Cardiac Continuous Monitoring 1    Cervical spine CT w/o contrast 1    EKG 12 lead 2    EKG 12-lead, tracing only 1    Foot XR, G/E 3 views, left 1    Head CT w/o  "contrast 1    INR 1    Troponin I 2    UA reflex to Microscopic and Culture 1    XR Chest Port 1 View 1      Orders Needing Specimen Collection     None      Pending Results     No orders found from 2017 to 2017.            Pending Culture Results     No orders found from 2017 to 2017.            Thank you for choosing [facility]       Thank you for choosing [facility] for your care. Our goal is always to provide you with excellent care. Hearing back from our patients is one way we can continue to improve our services. Please take a few minutes to complete the written survey that you may receive in the mail after you visit with us. Thank you!        [employer] Information     [facility] lets you send messages to your doctor, view your test results, renew your prescriptions, schedule appointments and more. To sign up, go to [email or web address] . Click on \"Log in\" on the left side of the screen, which will take you to the Welcome page. Then click on \"Sign up Now\" on the right side of the page.     You will be asked to enter the access code listed below, as well as some personal information. Please follow the directions to create your username and password.     Your access code is: [email or web address]  Expires: 2018  7:21 AM     Your access code will  in 90 days. If you need help or a new code, please call your [facility] clinic or [phone].        Care EveryWhere ID     This is your Care EveryWhere ID. This could be used by other organizations to access your [facility] medical records  [id number]        Equal Access to Services     [facility] ANGELINE : Hadii aad ku hadasho Soomaali, waaxda luqadaha, qaybta kaalmada adeegyada, carter yost . So [facility] [phone].    ATENCIÓN: Si habla español, tiene a prado disposición servicios gratuitos de asistencia lingüística. Llame al [phone].    We comply with applicable federal civil rights laws and [place] laws. We do not " discriminate on the basis of race, color, national origin, age, disability, sex, sexual orientation, or gender identity.            After Visit Summary       This is your record. Keep this with you and show to your community pharmacist(s) and doctor(s) at your next visit.

## 2017-11-24 NOTE — ED NOTES
CRITICAL LAB VALUE    DATE:  11/24/2017     TIME OF RECEIPT FROM LAB:  0726    LAB TEST:  Troponin    LAB VALUE:  0.156    RESULTS GIVEN WITH READ-BACK TO (PROVIDER):  No att. providers found    TIME LAB VALUE REPORTED TO PROVIDER:   0714

## 2017-11-24 NOTE — PROGRESS NOTES
Spoke with daughter to inform that patient would not be admitted to the hospital.  The greater preference for the family is to return to the [facility].  I did inquire about the option of a nursing home placement.  The only choice they would consider at this time is [facility] as they have had patient there for rehab in the past.  I spoke to Kristy who is not in.  I did leave a message for Georgie the .  I will follow the course for the family choice for care of this patient.  There is also the choice of looking at a hospice consult for added care for her.

## 2018-01-03 ENCOUNTER — DOCUMENTATION ONLY (OUTPATIENT)
Dept: FAMILY MEDICINE | Facility: OTHER | Age: 78
End: 2018-01-03

## 2018-01-07 PROBLEM — Z51.5 HOSPICE CARE PATIENT: Status: ACTIVE | Noted: 2018-01-07

## 2018-01-07 NOTE — PROGRESS NOTES
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  She continues to decline. Client resides at [facility]  and requires assistance with ADL's.  Goals of symptom control will be met.  Because of the progressive nature of the illness and associated comorbidities, client qualifies for hospice care.             I certify that this patient is terminally ill and has a life expectancy of 6 months or less if the terminal illness runs its anticipated course.        Attestation:  I confirm that this narrative was composed by myself and is based on review of the medical record and/or examination of the patient.            Gabriel Conner MD

## 2018-01-23 ENCOUNTER — RESULTS ONLY (OUTPATIENT)
Dept: LAB | Age: 78
End: 2018-01-23

## 2018-01-23 LAB
ALBUMIN UR-MCNC: NEGATIVE MG/DL
AMORPH CRY #/AREA URNS HPF: ABNORMAL /HPF
APPEARANCE UR: ABNORMAL
BACTERIA #/AREA URNS HPF: ABNORMAL /HPF
BILIRUB UR QL STRIP: NEGATIVE
COLOR UR AUTO: ABNORMAL
GLUCOSE UR STRIP-MCNC: NEGATIVE MG/DL
HGB UR QL STRIP: ABNORMAL
KETONES UR STRIP-MCNC: NEGATIVE MG/DL
LEUKOCYTE ESTERASE UR QL STRIP: ABNORMAL
NITRATE UR QL: POSITIVE
PH UR STRIP: 7.5 PH (ref 4.7–8)
RBC #/AREA URNS AUTO: 4 /HPF (ref 0–2)
SOURCE: ABNORMAL
SP GR UR STRIP: 1.01 (ref 1–1.03)
UROBILINOGEN UR STRIP-MCNC: NORMAL MG/DL (ref 0–2)
WBC #/AREA URNS AUTO: 14 /HPF (ref 0–2)

## 2018-01-25 LAB
BACTERIA SPEC CULT: ABNORMAL
SPECIMEN SOURCE: ABNORMAL

## 2018-02-22 ENCOUNTER — APPOINTMENT (OUTPATIENT)
Dept: CT IMAGING | Facility: HOSPITAL | Age: 78
End: 2018-02-22
Attending: EMERGENCY MEDICINE
Payer: COMMERCIAL

## 2018-02-22 ENCOUNTER — HOSPITAL ENCOUNTER (EMERGENCY)
Facility: HOSPITAL | Age: 78
Discharge: NURSING, REHAB OR LONG-TERM CARE | End: 2018-02-22
Attending: EMERGENCY MEDICINE | Admitting: EMERGENCY MEDICINE
Payer: COMMERCIAL

## 2018-02-22 VITALS
DIASTOLIC BLOOD PRESSURE: 86 MMHG | TEMPERATURE: 98.1 F | OXYGEN SATURATION: 98 % | RESPIRATION RATE: 18 BRPM | SYSTOLIC BLOOD PRESSURE: 135 MMHG

## 2018-02-22 DIAGNOSIS — R40.1 STUPOR: ICD-10-CM

## 2018-02-22 DIAGNOSIS — F01.53 VASCULAR DEMENTIA WITH DEPRESSED MOOD (H): ICD-10-CM

## 2018-02-22 DIAGNOSIS — M62.81 GENERALIZED MUSCLE WEAKNESS: ICD-10-CM

## 2018-02-22 LAB
ALBUMIN SERPL-MCNC: 3 G/DL (ref 3.4–5)
ALBUMIN UR-MCNC: 10 MG/DL
ALP SERPL-CCNC: 86 U/L (ref 40–150)
ALT SERPL W P-5'-P-CCNC: 25 U/L (ref 0–50)
ANION GAP SERPL CALCULATED.3IONS-SCNC: 7 MMOL/L (ref 3–14)
APPEARANCE UR: CLEAR
APTT PPP: 38 SEC (ref 24–37)
AST SERPL W P-5'-P-CCNC: 21 U/L (ref 0–45)
BACTERIA #/AREA URNS HPF: ABNORMAL /HPF
BASOPHILS # BLD AUTO: 0 10E9/L (ref 0–0.2)
BASOPHILS NFR BLD AUTO: 0.3 %
BILIRUB SERPL-MCNC: 0.8 MG/DL (ref 0.2–1.3)
BILIRUB UR QL STRIP: NEGATIVE
BUN SERPL-MCNC: 13 MG/DL (ref 7–30)
CALCIUM SERPL-MCNC: 8.1 MG/DL (ref 8.5–10.1)
CHLORIDE SERPL-SCNC: 108 MMOL/L (ref 94–109)
CK SERPL-CCNC: 79 U/L (ref 30–225)
CO2 SERPL-SCNC: 28 MMOL/L (ref 20–32)
COLOR UR AUTO: YELLOW
CREAT SERPL-MCNC: 0.67 MG/DL (ref 0.52–1.04)
CRP SERPL-MCNC: 41.6 MG/L (ref 0–8)
DIFFERENTIAL METHOD BLD: NORMAL
EOSINOPHIL # BLD AUTO: 0.1 10E9/L (ref 0–0.7)
EOSINOPHIL NFR BLD AUTO: 1.2 %
ERYTHROCYTE [DISTWIDTH] IN BLOOD BY AUTOMATED COUNT: 14.1 % (ref 10–15)
ERYTHROCYTE [SEDIMENTATION RATE] IN BLOOD BY WESTERGREN METHOD: 22 MM/H (ref 0–30)
FLUAV+FLUBV AG SPEC QL: NEGATIVE
FLUAV+FLUBV AG SPEC QL: NEGATIVE
GFR SERPL CREATININE-BSD FRML MDRD: 85 ML/MIN/1.7M2
GLUCOSE SERPL-MCNC: 100 MG/DL (ref 70–99)
GLUCOSE UR STRIP-MCNC: NEGATIVE MG/DL
HCT VFR BLD AUTO: 39.7 % (ref 35–47)
HGB BLD-MCNC: 13.7 G/DL (ref 11.7–15.7)
HGB UR QL STRIP: ABNORMAL
IMM GRANULOCYTES # BLD: 0 10E9/L (ref 0–0.4)
IMM GRANULOCYTES NFR BLD: 0.3 %
INR PPP: 2.92 (ref 0.8–1.2)
KETONES UR STRIP-MCNC: NEGATIVE MG/DL
LACTATE SERPL-SCNC: 1.6 MMOL/L (ref 0.4–2)
LEUKOCYTE ESTERASE UR QL STRIP: NEGATIVE
LYMPHOCYTES # BLD AUTO: 1 10E9/L (ref 0.8–5.3)
LYMPHOCYTES NFR BLD AUTO: 17.7 %
MAGNESIUM SERPL-MCNC: 2.1 MG/DL (ref 1.6–2.3)
MCH RBC QN AUTO: 30.6 PG (ref 26.5–33)
MCHC RBC AUTO-ENTMCNC: 34.5 G/DL (ref 31.5–36.5)
MCV RBC AUTO: 89 FL (ref 78–100)
MONOCYTES # BLD AUTO: 0.4 10E9/L (ref 0–1.3)
MONOCYTES NFR BLD AUTO: 6.6 %
MUCOUS THREADS #/AREA URNS LPF: PRESENT /LPF
NEUTROPHILS # BLD AUTO: 4.2 10E9/L (ref 1.6–8.3)
NEUTROPHILS NFR BLD AUTO: 73.9 %
NITRATE UR QL: NEGATIVE
NRBC # BLD AUTO: 0 10*3/UL
NRBC BLD AUTO-RTO: 0 /100
NT-PROBNP SERPL-MCNC: 619 PG/ML (ref 0–1800)
PH UR STRIP: 7 PH (ref 4.7–8)
PLATELET # BLD AUTO: 170 10E9/L (ref 150–450)
POTASSIUM SERPL-SCNC: 3.7 MMOL/L (ref 3.4–5.3)
PROT SERPL-MCNC: 7.2 G/DL (ref 6.8–8.8)
RBC # BLD AUTO: 4.48 10E12/L (ref 3.8–5.2)
RBC #/AREA URNS AUTO: 51 /HPF (ref 0–2)
SODIUM SERPL-SCNC: 143 MMOL/L (ref 133–144)
SOURCE: ABNORMAL
SP GR UR STRIP: 1.02 (ref 1–1.03)
SPECIMEN SOURCE: NORMAL
TROPONIN I SERPL-MCNC: <0.015 UG/L (ref 0–0.04)
UROBILINOGEN UR STRIP-MCNC: NORMAL MG/DL (ref 0–2)
WBC # BLD AUTO: 5.7 10E9/L (ref 4–11)
WBC #/AREA URNS AUTO: 2 /HPF (ref 0–2)

## 2018-02-22 PROCEDURE — 93010 ELECTROCARDIOGRAM REPORT: CPT | Mod: GW | Performed by: INTERNAL MEDICINE

## 2018-02-22 PROCEDURE — 87086 URINE CULTURE/COLONY COUNT: CPT | Performed by: EMERGENCY MEDICINE

## 2018-02-22 PROCEDURE — 85025 COMPLETE CBC W/AUTO DIFF WBC: CPT | Performed by: EMERGENCY MEDICINE

## 2018-02-22 PROCEDURE — 83880 ASSAY OF NATRIURETIC PEPTIDE: CPT | Mod: GZ | Performed by: EMERGENCY MEDICINE

## 2018-02-22 PROCEDURE — 96374 THER/PROPH/DIAG INJ IV PUSH: CPT | Mod: 59

## 2018-02-22 PROCEDURE — 87040 BLOOD CULTURE FOR BACTERIA: CPT | Mod: 59 | Performed by: EMERGENCY MEDICINE

## 2018-02-22 PROCEDURE — 85610 PROTHROMBIN TIME: CPT | Performed by: EMERGENCY MEDICINE

## 2018-02-22 PROCEDURE — 84484 ASSAY OF TROPONIN QUANT: CPT | Performed by: EMERGENCY MEDICINE

## 2018-02-22 PROCEDURE — 81001 URINALYSIS AUTO W/SCOPE: CPT | Performed by: EMERGENCY MEDICINE

## 2018-02-22 PROCEDURE — 96375 TX/PRO/DX INJ NEW DRUG ADDON: CPT | Mod: 59

## 2018-02-22 PROCEDURE — 83735 ASSAY OF MAGNESIUM: CPT | Performed by: EMERGENCY MEDICINE

## 2018-02-22 PROCEDURE — 80053 COMPREHEN METABOLIC PANEL: CPT | Performed by: EMERGENCY MEDICINE

## 2018-02-22 PROCEDURE — 71250 CT THORAX DX C-: CPT | Mod: TC

## 2018-02-22 PROCEDURE — 99285 EMERGENCY DEPT VISIT HI MDM: CPT | Mod: 25

## 2018-02-22 PROCEDURE — 86140 C-REACTIVE PROTEIN: CPT | Performed by: EMERGENCY MEDICINE

## 2018-02-22 PROCEDURE — 36415 COLL VENOUS BLD VENIPUNCTURE: CPT | Performed by: EMERGENCY MEDICINE

## 2018-02-22 PROCEDURE — 85730 THROMBOPLASTIN TIME PARTIAL: CPT | Performed by: EMERGENCY MEDICINE

## 2018-02-22 PROCEDURE — 25000128 H RX IP 250 OP 636: Performed by: EMERGENCY MEDICINE

## 2018-02-22 PROCEDURE — 93005 ELECTROCARDIOGRAM TRACING: CPT

## 2018-02-22 PROCEDURE — 25000125 ZZHC RX 250: Performed by: EMERGENCY MEDICINE

## 2018-02-22 PROCEDURE — 85652 RBC SED RATE AUTOMATED: CPT | Performed by: EMERGENCY MEDICINE

## 2018-02-22 PROCEDURE — 82550 ASSAY OF CK (CPK): CPT | Performed by: EMERGENCY MEDICINE

## 2018-02-22 PROCEDURE — 99285 EMERGENCY DEPT VISIT HI MDM: CPT | Mod: GV | Performed by: EMERGENCY MEDICINE

## 2018-02-22 PROCEDURE — 83605 ASSAY OF LACTIC ACID: CPT | Performed by: EMERGENCY MEDICINE

## 2018-02-22 PROCEDURE — 87804 INFLUENZA ASSAY W/OPTIC: CPT | Mod: 59 | Performed by: EMERGENCY MEDICINE

## 2018-02-22 RX ORDER — SODIUM CHLORIDE 9 MG/ML
1000 INJECTION, SOLUTION INTRAVENOUS CONTINUOUS
Status: DISCONTINUED | OUTPATIENT
Start: 2018-02-22 | End: 2018-02-22 | Stop reason: HOSPADM

## 2018-02-22 RX ORDER — HYDROMORPHONE HYDROCHLORIDE 4 MG/1
4 TABLET ORAL EVERY 6 HOURS PRN
Qty: 12 TABLET | Refills: 0 | Status: ON HOLD | OUTPATIENT
Start: 2018-02-22 | End: 2018-10-27

## 2018-02-22 RX ORDER — KETOROLAC TROMETHAMINE 15 MG/ML
15 INJECTION, SOLUTION INTRAMUSCULAR; INTRAVENOUS ONCE
Status: COMPLETED | OUTPATIENT
Start: 2018-02-22 | End: 2018-02-22

## 2018-02-22 RX ORDER — DILTIAZEM HYDROCHLORIDE 5 MG/ML
15 INJECTION INTRAVENOUS ONCE
Status: COMPLETED | OUTPATIENT
Start: 2018-02-22 | End: 2018-02-22

## 2018-02-22 RX ORDER — LIDOCAINE 40 MG/G
CREAM TOPICAL
Status: DISCONTINUED | OUTPATIENT
Start: 2018-02-22 | End: 2018-02-22 | Stop reason: HOSPADM

## 2018-02-22 RX ADMIN — KETOROLAC TROMETHAMINE 15 MG: 15 INJECTION, SOLUTION INTRAMUSCULAR; INTRAVENOUS at 11:21

## 2018-02-22 RX ADMIN — SODIUM CHLORIDE 1000 ML: 9 INJECTION, SOLUTION INTRAVENOUS at 11:20

## 2018-02-22 RX ADMIN — SODIUM CHLORIDE 1000 ML: 9 INJECTION, SOLUTION INTRAVENOUS at 09:41

## 2018-02-22 RX ADMIN — HYDROMORPHONE HYDROCHLORIDE 0.5 MG: 1 INJECTION, SOLUTION INTRAMUSCULAR; INTRAVENOUS; SUBCUTANEOUS at 11:42

## 2018-02-22 RX ADMIN — DILTIAZEM HYDROCHLORIDE 15 MG: 5 INJECTION INTRAVENOUS at 12:10

## 2018-02-22 NOTE — ED NOTES
patient arrived via ambulance from [facility] in [place]. Patient is alert knows her name but unable to tell me where she is or year.  Pt responds she doesn't know where lives at.  Any type of movement pt winces in pain, patient unable to tell where pain is at first but then reports her back, when touching her back no wincing noted until palpation near right scapula.  Patient lips and tongue are dry. Skin is warm to the touch.   Staff reported pt has been complaining of right hip and lower back pain the past few days.  Denies any falls or trauma, family wanted pt sent in to be evaluated

## 2018-02-22 NOTE — PROGRESS NOTES
This is a 77 year old woman who is residing at [facility].  She is here with apparent pain.  She is a [facility] hospice patient.  I did try to reach her  which was not successful. A message was left for her daughter, Brooke.  I was able to reach the son, who was in [place].    Dr Zaman spoke with Dr Taylor who indicates the plan was to have her sent to a skilled nursing home facility.    I did leave a message at [facility] to speak to someone about their plan.  I also left a message with Smooth at [facility].

## 2018-02-22 NOTE — ED AVS SNAPSHOT
[facility]    [address] [place] [address]    Phone:  [phone]                                       Danette Ramos   MRN: [id number]    Department:  [facility]   Date of Visit:  2/22/2018           After Visit Summary Signature Page     I have received my discharge instructions, and my questions have been answered. I have discussed any challenges I see with this plan with the nurse or doctor.    ..........................................................................................................................................  Patient/Patient Representative Signature      ..........................................................................................................................................  Patient Representative Print Name and Relationship to Patient    ..................................................               ................................................  Date                                            Time    ..........................................................................................................................................  Reviewed by Signature/Title    ...................................................              ..............................................  Date                                                            Time

## 2018-02-22 NOTE — ED AVS SNAPSHOT
" [facility]    [address] [address]    Phone:  [phone]                                       Danette Ramos   MRN: [id number]    Department:  [facility]   Date of Visit:  2/22/2018           Patient Information     Date Of Birth          1940        Your diagnoses for this visit were:     Generalized muscle weakness     Stupor     Vascular dementia with depressed mood        You were seen by Benjamin Zaman MD.      ED Discharge Orders     Activity       Activity Level: up as tolerated            DNR/DNI           Diet       Diet order: Regular  Fluid restriction? NA  Texture: mechanical soft  Thicken liquids? No            Encourage PO fluids           Fall precautions           General info for SNF       Admitting Provider(s): Crow Taylor     Admit to Facility: [facility]      Length of Stay Estimate: Long Term Care  Condition: Terminal  Level of care:skilled   Rehabilitation Potential: Poor  Admission H&P completed: No,  Geriatric Services provider to complete within 72 hours of SNF admission ( Network facilities only)  Recent Chemotherapy: No  Use Nursing Home Standing Orders: Yes    For questions about these admission orders, please contact:  [facility]  [address] [address]  Phone: [phone]            I have reviewed and agree with all the recommendations and orders detailed in this document.           Mantoux instructions (Administer two-step Mantoux unless history of TB or positive PPD)       Give two-step Mantoux (PPD) Per Facility Policy: No,        Mantoux result:  No results found for: PPDREDNESS, PPDINDURATIO    If history of positive PPD and/or Mantoux is contraindicated, obtain (per facility protocol) one of the following tests and notify provider only if there are abnormal results:     1. Past or current chest X-ray (CXR) for \"active TB negative\" status. The CXR needs to have been done " within three months of the patient's admission date. Obtain a copy of the CXR report to document the patient's status. If no such report available, obtain a CXR within 72 hours of admission.    2. QuantiFERON-TB Gold (QFT-G) blood test                     Review of your medicines      START taking        Dose / Directions Last dose taken    HYDROmorphone 4 MG tablet   Commonly known as:  DILAUDID   Dose:  4 mg   Quantity:  12 tablet        Take 1 tablet (4 mg) by mouth every 6 hours as needed for moderate to severe pain maximum 4 tablet(s) per day   Refills:  0          Our records show that you are taking the medicines listed below. If these are incorrect, please call your family doctor or clinic.        Dose / Directions Last dose taken    BENADRYL PO   Dose:  25 mg        Take 25 mg by mouth every 6 hours as needed   Refills:  0        COLACE PO   Dose:  100 mg        Take 100 mg by mouth as needed for constipation   Refills:  0        * COUMADIN PO   Dose:  2.5 mg        Take 2.5 mg by mouth Tuesday,Wednesday,Thursday,Saturdau,Sunday   Refills:  0        * COUMADIN PO   Dose:  5 mg        Take 5 mg by mouth Monday and Friday   Refills:  0        guaiFENesin 100 MG/5ML Syrp   Commonly known as:  ROBITUSSIN   Dose:  10 mL        Take 10 mLs by mouth every 4 hours as needed for cough   Refills:  0        KONSYL 100 % Powd   Generic drug:  psyllium        Take by mouth daily   Refills:  0        LEVETIRACETAM PO   Dose:  750 mg        Take 750 mg by mouth 2 times daily   Refills:  0        LIPITOR PO   Dose:  40 mg        Take 40 mg by mouth daily   Refills:  0        PREDNISONE PO   Dose:  5 mg        Take 5 mg by mouth daily   Refills:  0        RISPERDAL PO   Dose:  0.25 mg        Take 0.25 mg by mouth 2 times daily   Refills:  0        senna-docusate 8.6-50 MG per tablet   Commonly known as:  SENOKOT-S;PERICOLACE   Dose:  1 tablet        Take 1 tablet by mouth as needed for constipation   Refills:  0         SERTRALINE HCL PO   Dose:  25 mg        Take 25 mg by mouth daily   Refills:  0        TYLENOL PO   Dose:  650 mg        Take 650 mg by mouth every 6 hours as needed for mild pain or fever   Refills:  0        VITAMIN D (CHOLECALCIFEROL) PO   Dose:  2000 Units        Take 2,000 Units by mouth daily   Refills:  0        * Notice:  This list has 2 medication(s) that are the same as other medications prescribed for you. Read the directions carefully, and ask your doctor or other care provider to review them with you.            Prescriptions were sent or printed at these locations (1 Prescription)                   Other Prescriptions                Printed at Department/Unit printer (1 of 1)         HYDROmorphone (DILAUDID) 4 MG tablet                Procedures and tests performed during your visit     Procedure/Test Number of Times Performed    Blood culture 2    CBC with platelets differential 1    CK total 1    CRP inflammation 1    CT Chest Abdomen Pelvis w/o Contrast 1    Comprehensive metabolic panel 1    EKG 12 lead 1    Erythrocyte sedimentation rate auto 1    INR 1    Influenza A/B antigen 1    Lactic acid 1    Magnesium 1    Nt probnp inpatient (BNP) 1    Partial thromboplastin time 1    Peripheral IV catheter 1    Troponin I 1    UA with Microscopic reflex to Culture 1    Urine Culture 1      Orders Needing Specimen Collection     None      Pending Results     Date and Time Order Name Status Description    2/22/2018 0915 Blood culture In process     2/22/2018 0915 Blood culture In process     2/22/2018 0904 Urine Culture In process             Pending Culture Results     Date and Time Order Name Status Description    2/22/2018 0915 Blood culture In process     2/22/2018 0915 Blood culture In process     2/22/2018 0904 Urine Culture In process             Thank you for choosing [facility]       Thank you for choosing [facility] for your care. Our goal is always to provide you with excellent care. Hearing back  "from our patients is one way we can continue to improve our services. Please take a few minutes to complete the written survey that you may receive in the mail after you visit with us. Thank you!        Statement of Approval     Ordered          18 3858  I have reviewed and agree with all the recommendations and orders detailed in this document.     Approved and electronically signed by:  Benjamin Zaman MD MyChart Information     [facility] lets you send messages to your doctor, view your test results, renew your prescriptions, schedule appointments and more. To sign up, go to [email or web address] . Click on \"Log in\" on the left side of the screen, which will take you to the Welcome page. Then click on \"Sign up Now\" on the right side of the page.     You will be asked to enter the access code listed below, as well as some personal information. Please follow the directions to create your username and password.     Your access code is: [email or web address]  Expires: 2018  2:19 PM     Your access code will  in 90 days. If you need help or a new code, please call your [facility] clinic or [phone].        Care EveryWhere ID     This is your Care EveryWhere ID. This could be used by other organizations to access your [facility] medical records  [id number]        Equal Access to Services     DIAN MARCUS AH: Ja york Sojosie, waaxda luqadaha, qaybta kaalmada ke, carter moore. So [facility] [phone].    ATENCIÓN: Si habla español, tiene a prado disposición servicios gratuitos de asistencia lingüística. Llame al [phone].    We comply with applicable federal civil rights laws and [place] laws. We do not discriminate on the basis of race, color, national origin, age, disability, sex, sexual orientation, or gender identity.            After Visit Summary       This is your record. Keep this with you and show to your community pharmacist(s) and doctor(s) " at your next visit.

## 2018-02-22 NOTE — ED NOTES
Patient repositioned, pt c/o pain but doesn't state where pain is. Grimaces with repositioning.  Wet brief changed, danuta care done, IV infusing without incident

## 2018-02-22 NOTE — ED PROVIDER NOTES
History     Chief Complaint   Patient presents with     Generalized Weakness     HPI  Danette Ramos is a 77 year old female who has been resident at local assisted living facility with functional dementia and reportedly recent UTI.   She has been difficult to care for in the last few weeks, very weak and unable to assist in cares at all.  Hospice has been involved and does not believe she can continue in the current location at [facility]. Staff there however were concerned that she had injured herself or was obstructed in some way that was amplifying her recent deterioration.       Problem List:    Patient Active Problem List    Diagnosis Date Noted     Hospice care patient 01/07/2018     Priority: Medium        Past Medical History:    No past medical history on file.    Past Surgical History:    No past surgical history on file.    Family History:    No family history on file.    Social History:  Marital Status:   [2]  Social History   Substance Use Topics     Smoking status: Not on file     Smokeless tobacco: Not on file     Alcohol use Not on file        Medications:      DiphenhydrAMINE HCl (BENADRYL PO)   guaiFENesin (ROBITUSSIN) 100 MG/5ML SYRP   HYDROmorphone (DILAUDID) 4 MG tablet   Warfarin Sodium (COUMADIN PO)   Warfarin Sodium (COUMADIN PO)   psyllium (KONSYL) 100 % POWD   LEVETIRACETAM PO   Atorvastatin Calcium (LIPITOR PO)   PREDNISONE PO   RisperiDONE (RISPERDAL PO)   SERTRALINE HCL PO   VITAMIN D, CHOLECALCIFEROL, PO   Acetaminophen (TYLENOL PO)   Docusate Sodium (COLACE PO)   senna-docusate (SENOKOT-S;PERICOLACE) 8.6-50 MG per tablet         Review of Systems   Constitutional:        Unable to do ROS as patient was severely demented and altered.      Physical Exam   BP: 150/68  Heart Rate: 93  Temp: 98.3  F (36.8  C)  Resp: 16  SpO2: 93 %    Physical Exam   Constitutional: She appears well-developed and well-nourished. She appears distressed.   Danette appeared confused, guarding  and protectively rigid, unable to localize or verbalize. No obvious signs of trauma    HENT:   Head: Normocephalic and atraumatic.   Right Ear: External ear normal.   Left Ear: External ear normal.   Mouth/Throat: Oropharynx is clear and moist.   Dry mucous membranes.    Eyes: Conjunctivae and EOM are normal. Pupils are equal, round, and reactive to light.   Neck: Normal range of motion. Neck supple.   Cardiovascular: Normal rate.    Irregular rhythm    Pulmonary/Chest: Effort normal. No respiratory distress. She has no wheezes. She has no rales. She exhibits tenderness.   Abdominal: Soft. Bowel sounds are normal. She exhibits no distension and no mass. There is no tenderness. There is no rebound.   Musculoskeletal: Normal range of motion.   Hips and knees held flexed and resistant to passive motions.  Chest wall and paralumbar areas were tender without signs of obvious trauma.    Neurological: She displays abnormal reflex. She exhibits abnormal muscle tone.   Stuporous with signs of peripheral muscle tightness    Skin: Skin is warm. She is not diaphoretic.     ED Course     ED Course     Procedures  ECG  Atrial fibrillation with RVR, low voltage QRS, NSSTTW abnormality, QTc 443 ms      Critical Care time:  none        Labs Ordered and Resulted from Time of ED Arrival Up to the Time of Departure from the ED   CRP INFLAMMATION - Abnormal; Notable for the following:        Result Value    CRP Inflammation 41.6 (*)     All other components within normal limits   INR - Abnormal; Notable for the following:     INR 2.92 (*)     All other components within normal limits   PARTIAL THROMBOPLASTIN TIME - Abnormal; Notable for the following:     PTT 38 (*)     All other components within normal limits   COMPREHENSIVE METABOLIC PANEL - Abnormal; Notable for the following:     Glucose 100 (*)     Calcium 8.1 (*)     Albumin 3.0 (*)     All other components within normal limits   ROUTINE UA WITH MICROSCOPIC REFLEX TO CULTURE -  Abnormal; Notable for the following:     Blood Urine Moderate (*)     Protein Albumin Urine 10 (*)     RBC Urine 51 (*)     Bacteria Urine None (*)     Mucous Urine Present (*)     All other components within normal limits   CBC WITH PLATELETS DIFFERENTIAL   ERYTHROCYTE SEDIMENTATION RATE AUTO   MAGNESIUM   LACTIC ACID   TROPONIN I   CK TOTAL   NT PROBNP INPATIENT   PERIPHERAL IV CATHETER   URINE CULTURE AEROBIC BACTERIAL   BLOOD CULTURE   BLOOD CULTURE   INFLUENZA A/B ANTIGEN     Assessments & Plan (with Medical Decision Making)   Danette has progressive dementia probably vascular/multiinfarct in type, left hemiparesis, chronic atrial fib, Seizures, and hospice involvement in her end stage neurodegeneration.  It has been clear for the last 2 months on review of ED visits, that she does not appear to be properly placed in AL. Although she does not exhibit poor care, her degree of immobilitiy and transfer requirements alone suggest need for [place] and 2 person transfers at a minimum.  IV placed and labs obtained.  Suspected some type of febrile process probably viral.  All labs were remarkably benign with exception of CRP of 41.6.  She was therapeutically anticoagulated for her apparent at fib that flared during her eval to 140 bpm treated with cardizem 15mg IV with return to baseline 70.   MSW  Yin Balderas brought together info from family, facilities, and Hospice staff and was able to arrange placement in [facility].  Dr. Crow Taylor consulted and encouraged NH placement with hospice involvement.  ED to LTC face to face orders executed.  She has hospice meds at NH supplemented with hydromorphone below for next 2-3 days.    I have reviewed the nursing notes.    I have reviewed the findings, diagnosis, plan and need for follow up with the patient.    Discharge Medication List as of 2/22/2018  2:20 PM      START taking these medications    Details   HYDROmorphone (DILAUDID) 4 MG tablet Take 1 tablet  (4 mg) by mouth every 6 hours as needed for moderate to severe pain maximum 4 tablet(s) per day, Disp-12 tablet, R-0, Local Print             Final diagnoses:   Generalized muscle weakness   Stupor   Vascular dementia with depressed mood       2/22/2018   [facility]     Benjamin Zaman MD  02/22/18 [id number]

## 2018-02-22 NOTE — ED NOTES
Patient changed of wet brief.  Grimaces with positional changes. Dressed and explained to where she will be going.  No questions or concerns. Patient to be transported via [facility], Max A2 to get to .

## 2018-02-22 NOTE — PROGRESS NOTES
Spoke with Tracy from Hospice who indicates that they have been speaking about skilled placement.   I have asked if she had established a choice of vendor to which they had not determined.    There was a conversation also with An at [facility].  She is indicating many items they indicate they put in place though they have a plan that may not actually meet her needs based on what is being gathered by the physician to physician conversation.  Tracy did call back and she as spoken with Brooke who only wishes a placement at [facility].    She would accept alternative placement if she could conceivably go to [facility].  Call placed with a message left with Kristy.  Information presented.  Brooke and Kristy have accepted this patient for care.    I did speak to Tracy and was able to secure payment for her to go to the facility.    I did alert [facility].    [facility] will provide transportation.  Tilt back chair.    Tracy was keeping the family in the loop.  Call placed to Kristy to update.

## 2018-02-24 LAB
BACTERIA SPEC CULT: NO GROWTH
SPECIMEN SOURCE: NORMAL

## 2018-02-28 LAB
BACTERIA SPEC CULT: NORMAL
BACTERIA SPEC CULT: NORMAL
SPECIMEN SOURCE: NORMAL
SPECIMEN SOURCE: NORMAL

## 2018-04-03 ENCOUNTER — DOCUMENTATION ONLY (OUTPATIENT)
Dept: FAMILY MEDICINE | Facility: OTHER | Age: 78
End: 2018-04-03

## 2018-04-17 NOTE — PROGRESS NOTES
PHYSICIAN CERTIFICATION OF TERMINAL ILLNESS FOR HOSPICE BENEFIT    April 03, 2018    Danette Ramos    1940      Effective Date of Certification    Start Date:  04-03-18      End Date:  07-01-18    Terminal Diagnosis:   CVA      Secondary Diagnoses:     Left hemiparesis           Prognosis Related Comorbidities:    Dementia, mixed    Atrial fibrillation     Hypertension     Narrative Statement:  Client suffers from cerebrovascular disease with left hemiparesis and is on maximal medical therapy.  She  continues to decline. [facility]  and requires assistance with ADL's.  Goals of symptom control will be met.  Because of the progressive nature of the illness and associated comorbidities, client qualifies for hospice care.             I certify that this patient is terminally ill and has a life expectancy of 6 months or less if the terminal illness runs its anticipated course.        Attestation:  I confirm that this narrative was composed by myself and is based on review of the medical record and/or examination of the patient.            Gabriel Conner MD

## 2018-08-07 DIAGNOSIS — Z00.00 HEALTHCARE MAINTENANCE: Primary | ICD-10-CM

## 2018-08-07 RX ORDER — FAMOTIDINE 20 MG
2000 TABLET ORAL DAILY
Qty: 30 CAPSULE | Refills: 0 | Status: SHIPPED | OUTPATIENT
Start: 2018-08-07

## 2018-08-07 NOTE — TELEPHONE ENCOUNTER
VITAMIN D, CHOLECALCIFEROL, PO  Last Written Prescription Date:  Historical medication   Last Fill Quantity: 0,   # refills: 0  Last Office Visit: 4/3/18  Future Office visit:       Routing refill request to provider for review/approval because:  Medication is reported/historical

## 2018-10-27 ENCOUNTER — HOSPITAL ENCOUNTER (INPATIENT)
Facility: HOSPITAL | Age: 78
LOS: 3 days | Discharge: NURSING, REHAB OR LONG-TERM CARE | DRG: 193 | End: 2018-10-31
Attending: INTERNAL MEDICINE | Admitting: INTERNAL MEDICINE
Payer: MEDICARE

## 2018-10-27 ENCOUNTER — APPOINTMENT (OUTPATIENT)
Dept: GENERAL RADIOLOGY | Facility: HOSPITAL | Age: 78
DRG: 193 | End: 2018-10-27
Attending: INTERNAL MEDICINE
Payer: MEDICARE

## 2018-10-27 ENCOUNTER — APPOINTMENT (OUTPATIENT)
Dept: ULTRASOUND IMAGING | Facility: HOSPITAL | Age: 78
DRG: 193 | End: 2018-10-27
Attending: INTERNAL MEDICINE
Payer: MEDICARE

## 2018-10-27 DIAGNOSIS — Z51.5 HOSPICE CARE PATIENT: Primary | ICD-10-CM

## 2018-10-27 DIAGNOSIS — J18.9 PNEUMONIA: ICD-10-CM

## 2018-10-27 DIAGNOSIS — I48.20 CHRONIC ATRIAL FIBRILLATION (H): ICD-10-CM

## 2018-10-27 DIAGNOSIS — J18.9 COMMUNITY ACQUIRED PNEUMONIA, UNSPECIFIED LATERALITY: ICD-10-CM

## 2018-10-27 LAB
ALBUMIN SERPL-MCNC: 3.2 G/DL (ref 3.4–5)
ALBUMIN UR-MCNC: 10 MG/DL
ALP SERPL-CCNC: 91 U/L (ref 40–150)
ALT SERPL W P-5'-P-CCNC: 24 U/L (ref 0–50)
AMORPH CRY #/AREA URNS HPF: ABNORMAL /HPF
ANION GAP SERPL CALCULATED.3IONS-SCNC: 5 MMOL/L (ref 3–14)
APPEARANCE UR: ABNORMAL
AST SERPL W P-5'-P-CCNC: 17 U/L (ref 0–45)
BACTERIA #/AREA URNS HPF: ABNORMAL /HPF
BASOPHILS # BLD AUTO: 0 10E9/L (ref 0–0.2)
BASOPHILS NFR BLD AUTO: 0.7 %
BILIRUB SERPL-MCNC: 0.4 MG/DL (ref 0.2–1.3)
BILIRUB UR QL STRIP: NEGATIVE
BUN SERPL-MCNC: 11 MG/DL (ref 7–30)
CALCIUM SERPL-MCNC: 8 MG/DL (ref 8.5–10.1)
CHLORIDE SERPL-SCNC: 108 MMOL/L (ref 94–109)
CK SERPL-CCNC: 45 U/L (ref 30–225)
CO2 SERPL-SCNC: 30 MMOL/L (ref 20–32)
COLOR UR AUTO: YELLOW
CREAT SERPL-MCNC: 0.7 MG/DL (ref 0.52–1.04)
CRP SERPL-MCNC: 3.3 MG/L (ref 0–8)
DIFFERENTIAL METHOD BLD: NORMAL
EOSINOPHIL # BLD AUTO: 0.2 10E9/L (ref 0–0.7)
EOSINOPHIL NFR BLD AUTO: 2.8 %
ERYTHROCYTE [DISTWIDTH] IN BLOOD BY AUTOMATED COUNT: 14.6 % (ref 10–15)
GFR SERPL CREATININE-BSD FRML MDRD: 81 ML/MIN/1.7M2
GLUCOSE SERPL-MCNC: 88 MG/DL (ref 70–99)
GLUCOSE UR STRIP-MCNC: NEGATIVE MG/DL
HCT VFR BLD AUTO: 41.6 % (ref 35–47)
HGB BLD-MCNC: 13.9 G/DL (ref 11.7–15.7)
HGB UR QL STRIP: ABNORMAL
IMM GRANULOCYTES # BLD: 0 10E9/L (ref 0–0.4)
IMM GRANULOCYTES NFR BLD: 0.2 %
INR PPP: 2.37 (ref 0.8–1.2)
KETONES UR STRIP-MCNC: NEGATIVE MG/DL
LACTATE SERPL-SCNC: 2.2 MMOL/L (ref 0.4–2)
LACTATE SERPL-SCNC: 3.6 MMOL/L (ref 0.4–2)
LEUKOCYTE ESTERASE UR QL STRIP: ABNORMAL
LYMPHOCYTES # BLD AUTO: 2.4 10E9/L (ref 0.8–5.3)
LYMPHOCYTES NFR BLD AUTO: 39 %
MCH RBC QN AUTO: 29.5 PG (ref 26.5–33)
MCHC RBC AUTO-ENTMCNC: 33.4 G/DL (ref 31.5–36.5)
MCV RBC AUTO: 88 FL (ref 78–100)
MONOCYTES # BLD AUTO: 0.3 10E9/L (ref 0–1.3)
MONOCYTES NFR BLD AUTO: 5.6 %
MUCOUS THREADS #/AREA URNS LPF: PRESENT /LPF
NEUTROPHILS # BLD AUTO: 3.1 10E9/L (ref 1.6–8.3)
NEUTROPHILS NFR BLD AUTO: 51.7 %
NITRATE UR QL: POSITIVE
NRBC # BLD AUTO: 0 10*3/UL
NRBC BLD AUTO-RTO: 0 /100
NT-PROBNP SERPL-MCNC: 1311 PG/ML (ref 0–1800)
PH UR STRIP: 7 PH (ref 4.7–8)
PLATELET # BLD AUTO: 202 10E9/L (ref 150–450)
POTASSIUM SERPL-SCNC: 4 MMOL/L (ref 3.4–5.3)
PROT SERPL-MCNC: 7.5 G/DL (ref 6.8–8.8)
RBC # BLD AUTO: 4.71 10E12/L (ref 3.8–5.2)
RBC #/AREA URNS AUTO: 11 /HPF (ref 0–2)
SODIUM SERPL-SCNC: 143 MMOL/L (ref 133–144)
SOURCE: ABNORMAL
SP GR UR STRIP: 1.02 (ref 1–1.03)
TROPONIN I SERPL-MCNC: <0.015 UG/L (ref 0–0.04)
UROBILINOGEN UR STRIP-MCNC: NORMAL MG/DL (ref 0–2)
WBC # BLD AUTO: 6 10E9/L (ref 4–11)
WBC #/AREA URNS AUTO: 22 /HPF (ref 0–5)

## 2018-10-27 PROCEDURE — 85610 PROTHROMBIN TIME: CPT | Performed by: INTERNAL MEDICINE

## 2018-10-27 PROCEDURE — 25000125 ZZHC RX 250: Performed by: INTERNAL MEDICINE

## 2018-10-27 PROCEDURE — 86140 C-REACTIVE PROTEIN: CPT | Performed by: INTERNAL MEDICINE

## 2018-10-27 PROCEDURE — 25000125 ZZHC RX 250

## 2018-10-27 PROCEDURE — 81001 URINALYSIS AUTO W/SCOPE: CPT | Performed by: INTERNAL MEDICINE

## 2018-10-27 PROCEDURE — G0378 HOSPITAL OBSERVATION PER HR: HCPCS

## 2018-10-27 PROCEDURE — 25000132 ZZH RX MED GY IP 250 OP 250 PS 637: Mod: GY | Performed by: INTERNAL MEDICINE

## 2018-10-27 PROCEDURE — 82550 ASSAY OF CK (CPK): CPT | Performed by: INTERNAL MEDICINE

## 2018-10-27 PROCEDURE — 36415 COLL VENOUS BLD VENIPUNCTURE: CPT | Performed by: INTERNAL MEDICINE

## 2018-10-27 PROCEDURE — 87186 SC STD MICRODIL/AGAR DIL: CPT | Performed by: INTERNAL MEDICINE

## 2018-10-27 PROCEDURE — 84484 ASSAY OF TROPONIN QUANT: CPT | Performed by: INTERNAL MEDICINE

## 2018-10-27 PROCEDURE — 83880 ASSAY OF NATRIURETIC PEPTIDE: CPT | Performed by: INTERNAL MEDICINE

## 2018-10-27 PROCEDURE — 83605 ASSAY OF LACTIC ACID: CPT | Performed by: INTERNAL MEDICINE

## 2018-10-27 PROCEDURE — 93010 ELECTROCARDIOGRAM REPORT: CPT | Performed by: INTERNAL MEDICINE

## 2018-10-27 PROCEDURE — 96375 TX/PRO/DX INJ NEW DRUG ADDON: CPT

## 2018-10-27 PROCEDURE — 40000786 ZZHCL STATISTIC ACTIVE MRSA SURVEILLANCE CULTURE: Performed by: INTERNAL MEDICINE

## 2018-10-27 PROCEDURE — 25000128 H RX IP 250 OP 636: Performed by: INTERNAL MEDICINE

## 2018-10-27 PROCEDURE — 25000128 H RX IP 250 OP 636: Performed by: FAMILY MEDICINE

## 2018-10-27 PROCEDURE — 85025 COMPLETE CBC W/AUTO DIFF WBC: CPT | Performed by: INTERNAL MEDICINE

## 2018-10-27 PROCEDURE — 87086 URINE CULTURE/COLONY COUNT: CPT | Performed by: INTERNAL MEDICINE

## 2018-10-27 PROCEDURE — 87040 BLOOD CULTURE FOR BACTERIA: CPT | Performed by: FAMILY MEDICINE

## 2018-10-27 PROCEDURE — 96376 TX/PRO/DX INJ SAME DRUG ADON: CPT

## 2018-10-27 PROCEDURE — 99285 EMERGENCY DEPT VISIT HI MDM: CPT | Mod: Z6 | Performed by: INTERNAL MEDICINE

## 2018-10-27 PROCEDURE — 93970 EXTREMITY STUDY: CPT | Mod: TC

## 2018-10-27 PROCEDURE — 99223 1ST HOSP IP/OBS HIGH 75: CPT | Mod: AI | Performed by: INTERNAL MEDICINE

## 2018-10-27 PROCEDURE — 93005 ELECTROCARDIOGRAM TRACING: CPT

## 2018-10-27 PROCEDURE — 99285 EMERGENCY DEPT VISIT HI MDM: CPT | Mod: 25

## 2018-10-27 PROCEDURE — 94640 AIRWAY INHALATION TREATMENT: CPT

## 2018-10-27 PROCEDURE — 36415 COLL VENOUS BLD VENIPUNCTURE: CPT | Performed by: FAMILY MEDICINE

## 2018-10-27 PROCEDURE — 87088 URINE BACTERIA CULTURE: CPT | Performed by: INTERNAL MEDICINE

## 2018-10-27 PROCEDURE — A9270 NON-COVERED ITEM OR SERVICE: HCPCS | Mod: GY | Performed by: INTERNAL MEDICINE

## 2018-10-27 PROCEDURE — 96365 THER/PROPH/DIAG IV INF INIT: CPT

## 2018-10-27 PROCEDURE — 80053 COMPREHEN METABOLIC PANEL: CPT | Performed by: INTERNAL MEDICINE

## 2018-10-27 PROCEDURE — 71045 X-RAY EXAM CHEST 1 VIEW: CPT | Mod: TC

## 2018-10-27 RX ORDER — METOPROLOL TARTRATE 25 MG/1
25 TABLET, FILM COATED ORAL 2 TIMES DAILY
Status: DISCONTINUED | OUTPATIENT
Start: 2018-10-27 | End: 2018-10-27

## 2018-10-27 RX ORDER — LIDOCAINE 40 MG/G
CREAM TOPICAL
Status: DISCONTINUED | OUTPATIENT
Start: 2018-10-27 | End: 2018-10-31 | Stop reason: HOSPADM

## 2018-10-27 RX ORDER — BISACODYL 10 MG
10 SUPPOSITORY, RECTAL RECTAL
Status: DISCONTINUED | OUTPATIENT
Start: 2018-10-27 | End: 2018-10-31 | Stop reason: HOSPADM

## 2018-10-27 RX ORDER — GUAIFENESIN/DEXTROMETHORPHAN 100-10MG/5
5 SYRUP ORAL EVERY 4 HOURS PRN
Status: DISCONTINUED | OUTPATIENT
Start: 2018-10-27 | End: 2018-10-31 | Stop reason: HOSPADM

## 2018-10-27 RX ORDER — HYDROMORPHONE HYDROCHLORIDE 2 MG/1
2 TABLET ORAL EVERY 4 HOURS PRN
Status: DISCONTINUED | OUTPATIENT
Start: 2018-10-27 | End: 2018-10-31 | Stop reason: HOSPADM

## 2018-10-27 RX ORDER — DILTIAZEM HYDROCHLORIDE 5 MG/ML
10 INJECTION INTRAVENOUS ONCE
Status: COMPLETED | OUTPATIENT
Start: 2018-10-27 | End: 2018-10-27

## 2018-10-27 RX ORDER — ACETAMINOPHEN 325 MG/1
650 TABLET ORAL EVERY 4 HOURS PRN
Status: DISCONTINUED | OUTPATIENT
Start: 2018-10-27 | End: 2018-10-31 | Stop reason: HOSPADM

## 2018-10-27 RX ORDER — DILTIAZEM HYDROCHLORIDE 5 MG/ML
INJECTION INTRAVENOUS
Status: DISCONTINUED
Start: 2018-10-27 | End: 2018-10-27 | Stop reason: HOSPADM

## 2018-10-27 RX ORDER — METOPROLOL TARTRATE 25 MG/1
25 TABLET, FILM COATED ORAL ONCE
Status: COMPLETED | OUTPATIENT
Start: 2018-10-27 | End: 2018-10-27

## 2018-10-27 RX ORDER — RISPERIDONE 0.25 MG/1
0.25 TABLET ORAL 2 TIMES DAILY
Status: DISCONTINUED | OUTPATIENT
Start: 2018-10-27 | End: 2018-10-31 | Stop reason: HOSPADM

## 2018-10-27 RX ORDER — SODIUM CHLORIDE 9 MG/ML
INJECTION, SOLUTION INTRAVENOUS CONTINUOUS
Status: DISCONTINUED | OUTPATIENT
Start: 2018-10-27 | End: 2018-10-27

## 2018-10-27 RX ORDER — CEFTRIAXONE SODIUM 2 G/50ML
2 INJECTION, SOLUTION INTRAVENOUS ONCE
Status: COMPLETED | OUTPATIENT
Start: 2018-10-27 | End: 2018-10-27

## 2018-10-27 RX ORDER — CEFTRIAXONE SODIUM 2 G/50ML
2 INJECTION, SOLUTION INTRAVENOUS EVERY 24 HOURS
Status: DISCONTINUED | OUTPATIENT
Start: 2018-10-28 | End: 2018-10-31 | Stop reason: HOSPADM

## 2018-10-27 RX ORDER — GUAIFENESIN/DEXTROMETHORPHAN 100-10MG/5
5 SYRUP ORAL EVERY 4 HOURS PRN
COMMUNITY
End: 2022-11-06

## 2018-10-27 RX ORDER — ONDANSETRON 4 MG/1
4 TABLET, ORALLY DISINTEGRATING ORAL EVERY 6 HOURS PRN
Status: DISCONTINUED | OUTPATIENT
Start: 2018-10-27 | End: 2018-10-31 | Stop reason: HOSPADM

## 2018-10-27 RX ORDER — AMOXICILLIN 250 MG
1 CAPSULE ORAL 2 TIMES DAILY
Status: DISCONTINUED | OUTPATIENT
Start: 2018-10-27 | End: 2018-10-31 | Stop reason: HOSPADM

## 2018-10-27 RX ORDER — NALOXONE HYDROCHLORIDE 0.4 MG/ML
.1-.4 INJECTION, SOLUTION INTRAMUSCULAR; INTRAVENOUS; SUBCUTANEOUS
Status: DISCONTINUED | OUTPATIENT
Start: 2018-10-27 | End: 2018-10-31 | Stop reason: HOSPADM

## 2018-10-27 RX ORDER — METOPROLOL TARTRATE 1 MG/ML
5 INJECTION, SOLUTION INTRAVENOUS ONCE
Status: COMPLETED | OUTPATIENT
Start: 2018-10-27 | End: 2018-10-27

## 2018-10-27 RX ORDER — PREDNISONE 5 MG/1
5 TABLET ORAL
Status: DISCONTINUED | OUTPATIENT
Start: 2018-10-27 | End: 2018-10-31 | Stop reason: HOSPADM

## 2018-10-27 RX ORDER — DILTIAZEM HYDROCHLORIDE 5 MG/ML
15 INJECTION INTRAVENOUS ONCE
Status: COMPLETED | OUTPATIENT
Start: 2018-10-27 | End: 2018-10-27

## 2018-10-27 RX ORDER — ONDANSETRON 2 MG/ML
4 INJECTION INTRAMUSCULAR; INTRAVENOUS EVERY 6 HOURS PRN
Status: DISCONTINUED | OUTPATIENT
Start: 2018-10-27 | End: 2018-10-31 | Stop reason: HOSPADM

## 2018-10-27 RX ORDER — BISACODYL 10 MG
10 SUPPOSITORY, RECTAL RECTAL
Status: DISCONTINUED | OUTPATIENT
Start: 2018-10-27 | End: 2018-10-27 | Stop reason: DRUGHIGH

## 2018-10-27 RX ORDER — ACETAMINOPHEN 650 MG/1
650 SUPPOSITORY RECTAL EVERY 4 HOURS PRN
Status: DISCONTINUED | OUTPATIENT
Start: 2018-10-27 | End: 2018-10-31 | Stop reason: HOSPADM

## 2018-10-27 RX ORDER — ATORVASTATIN CALCIUM 40 MG/1
40 TABLET, FILM COATED ORAL
Status: DISCONTINUED | OUTPATIENT
Start: 2018-10-27 | End: 2018-10-31 | Stop reason: HOSPADM

## 2018-10-27 RX ORDER — SODIUM CHLORIDE 9 MG/ML
INJECTION, SOLUTION INTRAVENOUS CONTINUOUS
Status: DISCONTINUED | OUTPATIENT
Start: 2018-10-27 | End: 2018-10-31 | Stop reason: HOSPADM

## 2018-10-27 RX ORDER — METOPROLOL TARTRATE 50 MG
50 TABLET ORAL 2 TIMES DAILY
Status: DISCONTINUED | OUTPATIENT
Start: 2018-10-27 | End: 2018-10-28

## 2018-10-27 RX ORDER — DILTIAZEM HYDROCHLORIDE 5 MG/ML
INJECTION INTRAVENOUS
Status: COMPLETED
Start: 2018-10-27 | End: 2018-10-27

## 2018-10-27 RX ORDER — IPRATROPIUM BROMIDE AND ALBUTEROL SULFATE 2.5; .5 MG/3ML; MG/3ML
3 SOLUTION RESPIRATORY (INHALATION) ONCE
Status: COMPLETED | OUTPATIENT
Start: 2018-10-27 | End: 2018-10-27

## 2018-10-27 RX ORDER — BISACODYL 10 MG
10 SUPPOSITORY, RECTAL RECTAL
COMMUNITY

## 2018-10-27 RX ORDER — WARFARIN SODIUM 2.5 MG/1
2.5 TABLET ORAL
Status: COMPLETED | OUTPATIENT
Start: 2018-10-27 | End: 2018-10-27

## 2018-10-27 RX ORDER — SODIUM CHLORIDE 9 MG/ML
INJECTION, SOLUTION INTRAVENOUS CONTINUOUS
Status: DISCONTINUED | OUTPATIENT
Start: 2018-10-27 | End: 2018-10-28

## 2018-10-27 RX ADMIN — HYDROMORPHONE HYDROCHLORIDE 1 MG: 1 INJECTION, SOLUTION INTRAMUSCULAR; INTRAVENOUS; SUBCUTANEOUS at 21:40

## 2018-10-27 RX ADMIN — PSYLLIUM HUSK 1 PACKET: 3.4 POWDER ORAL at 16:24

## 2018-10-27 RX ADMIN — SODIUM CHLORIDE, PRESERVATIVE FREE: 5 INJECTION INTRAVENOUS at 16:22

## 2018-10-27 RX ADMIN — LEVETIRACETAM 750 MG: 250 TABLET, FILM COATED ORAL at 20:08

## 2018-10-27 RX ADMIN — PREDNISONE 5 MG: 5 TABLET ORAL at 16:21

## 2018-10-27 RX ADMIN — ATORVASTATIN CALCIUM 40 MG: 40 TABLET, FILM COATED ORAL at 16:21

## 2018-10-27 RX ADMIN — METOPROLOL TARTRATE 50 MG: 50 TABLET, FILM COATED ORAL at 20:08

## 2018-10-27 RX ADMIN — SERTRALINE HYDROCHLORIDE 75 MG: 50 TABLET ORAL at 16:25

## 2018-10-27 RX ADMIN — HYDROMORPHONE HYDROCHLORIDE 2 MG: 2 TABLET ORAL at 20:08

## 2018-10-27 RX ADMIN — METOPROLOL TARTRATE 5 MG: 1 INJECTION, SOLUTION INTRAVENOUS at 19:40

## 2018-10-27 RX ADMIN — SODIUM CHLORIDE, PRESERVATIVE FREE: 5 INJECTION INTRAVENOUS at 14:49

## 2018-10-27 RX ADMIN — AZITHROMYCIN MONOHYDRATE 500 MG: 500 INJECTION, POWDER, LYOPHILIZED, FOR SOLUTION INTRAVENOUS at 11:23

## 2018-10-27 RX ADMIN — SODIUM CHLORIDE: 9 INJECTION, SOLUTION INTRAVENOUS at 08:40

## 2018-10-27 RX ADMIN — WARFARIN SODIUM 2.5 MG: 2.5 TABLET ORAL at 18:04

## 2018-10-27 RX ADMIN — CEFTRIAXONE SODIUM 2 G: 2 INJECTION, SOLUTION INTRAVENOUS at 10:53

## 2018-10-27 RX ADMIN — IPRATROPIUM BROMIDE AND ALBUTEROL SULFATE 3 ML: .5; 3 SOLUTION RESPIRATORY (INHALATION) at 07:50

## 2018-10-27 RX ADMIN — METOPROLOL TARTRATE 25 MG: 25 TABLET ORAL at 13:45

## 2018-10-27 RX ADMIN — DILTIAZEM HYDROCHLORIDE 10 MG: 5 INJECTION INTRAVENOUS at 21:28

## 2018-10-27 RX ADMIN — METOPROLOL TARTRATE 25 MG: 25 TABLET ORAL at 16:21

## 2018-10-27 RX ADMIN — VITAMIN D, TAB 1000IU (100/BT) 2000 UNITS: 25 TAB at 16:26

## 2018-10-27 RX ADMIN — METOPROLOL TARTRATE 5 MG: 1 INJECTION, SOLUTION INTRAVENOUS at 12:50

## 2018-10-27 RX ADMIN — DILTIAZEM HYDROCHLORIDE 15 MG: 5 INJECTION INTRAVENOUS at 07:59

## 2018-10-27 RX ADMIN — RISPERIDONE 0.25 MG: 0.25 TABLET ORAL at 20:08

## 2018-10-27 RX ADMIN — SODIUM CHLORIDE, PRESERVATIVE FREE 500 ML: 5 INJECTION INTRAVENOUS at 12:39

## 2018-10-27 ASSESSMENT — ENCOUNTER SYMPTOMS
SHORTNESS OF BREATH: 1
WHEEZING: 1

## 2018-10-27 NOTE — IP AVS SNAPSHOT
[facility]    [address] [place] [id number]    Phone:  [phone]    Fax:  [phone]                                       After Visit Summary   10/27/2018    Danette Ramos    MRN: [id number]           After Visit Summary Signature Page     I have received my discharge instructions, and my questions have been answered. I have discussed any challenges I see with this plan with the nurse or doctor.    ..........................................................................................................................................  Patient/Patient Representative Signature      ..........................................................................................................................................  Patient Representative Print Name and Relationship to Patient    ..................................................               ................................................  Date                                   Time    ..........................................................................................................................................  Reviewed by Signature/Title    ...................................................              ..............................................  Date                                               Time          22EPIC Rev 08/18

## 2018-10-27 NOTE — IP AVS SNAPSHOT
MRN:[id number]                      After Visit Summary   10/27/2018    Danette Ramos    MRN: [id number]           Thank you!     Thank you for choosing [facility] for your care. Our goal is always to provide you with excellent care. Hearing back from our patients is one way we can continue to improve our services. Please take a few minutes to complete the written survey that you may receive in the mail after you visit with us. Thank you!        Patient Information     Date Of Birth          1940        Designated Caregiver       Most Recent Value    Caregiver    Will someone help with your care after discharge? yes    Name of designated caregiver Luisana Porter    Phone number of caregiver [phone]    Caregiver address [address] [address]      About your hospital stay     You were admitted on:  October 27, 2018 You last received care in the:  [facility]    You were discharged on:  October 31, 2018        Reason for your hospital stay       pneumonia                  Who to Call     For medical emergencies, please call 911.  For non-urgent questions about your medical care, please call your primary care provider or clinic, [phone]          Attending Provider     Provider Specialty    Long Snow MD Emergency Medicine    Brenda Vieyra MD [facility]    Sekou Griffith MD Internal Medicine       Primary Care Provider Office Phone # Fax #    Crow TaylorDO [phone]      After Care Instructions     Activity - Up with nursing assistance           Advance Diet as Tolerated       Follow this diet upon discharge: Orders Placed This Encounter      Room Service      Regular Diet Adult            Fall precautions           General info for SNF       Length of Stay Estimate: Long Term Care  Condition at Discharge: Stable  Level of care:skilled   Rehabilitation Potential: Poor  Admission H&P remains valid and up-to-date: Yes  Recent Chemotherapy:  "N/A  Use Nursing Home Standing Orders: Yes            Mantoux instructions       Give two-step Mantoux (PPD) Per Facility Policy Yes            Seizure precautions                 Follow-up Appointments     Follow Up and recommended labs and tests       Follow up with senior living physician.  The following labs/tests are recommended: INR 2-3 days.                  Additional Services     Occupational Therapy Adult Consult       Evaluate and treat as clinically indicated.    Reason:  weakness            Physical Therapy Adult Consult       Evaluate and treat as clinically indicated.    Reason:  weakness                  Pending Results     Date and Time Order Name Status Description    10/27/2018 1013 Blood culture Preliminary     10/27/2018 1011 Blood culture Preliminary             Statement of Approval     Ordered          10/31/18 1002  I have reviewed and agree with all the recommendations and orders detailed in this document.  EFFECTIVE NOW     Approved and electronically signed by:  Gamal Reyes MD             Admission Information     Date & Time Provider Department Dept. Phone    10/27/2018 Sekou Griffith MD [facility] [phone]      Your Vitals Were     Blood Pressure Pulse Temperature Respirations Height Weight    132/60 74 98.9  F (37.2  C) 15 1.702 m (5' 7\") 78.5 kg (173 lb 1 oz)    Pulse Oximetry BMI (Body Mass Index)                93% 27.11 kg/m2          MyChart Information     [employer] lets you send messages to your doctor, view your test results, renew your prescriptions, schedule appointments and more. To sign up, go to [email or web address] . Click on \"Log in\" on the left side of the screen, which will take you to the Welcome page. Then click on \"Sign up Now\" on the right side of the page.     You will be asked to enter the access code listed below, as well as some personal information. Please follow the directions to create your username and password.     Your access code is: " [email or web address]  Expires: 2019 10:10 AM     Your access code will  in 90 days. If you need help or a new code, please call your [facility] clinic or [phone].        Care EveryWhere ID     This is your Care EveryWhere ID. This could be used by other organizations to access your [facility] medical records  [id number]        Equal Access to Services     [facility]OD[facility] : Hadii aad ku hadasho Soomaali, waaxda luqadaha, qaybta kaalmada adeegyada, waxay idiin hayaan adeeg ricki laZachjamie . So [facility] [phone].    ATENCIÓN: Si habla español, tiene a prado disposición servicios gratuitos de asistencia lingüística. Real al [phone].    We comply with applicable federal civil rights laws and [place] laws. We do not discriminate on the basis of race, color, national origin, age, disability, sex, sexual orientation, or gender identity.               Review of your medicines      START taking        Dose / Directions    diltiazem 90 MG tablet   Commonly known as:  CARDIZEM   Used for:  Chronic atrial fibrillation (H)        Dose:  90 mg   Take 1 tablet (90 mg) by mouth every 6 hours   Quantity:  120 tablet   Refills:  0       levofloxacin 500 MG tablet   Commonly known as:  LEVAQUIN   Used for:  Community acquired pneumonia, unspecified laterality        Dose:  500 mg   Take 1 tablet (500 mg) by mouth daily   Quantity:  7 tablet   Refills:  0       metoprolol tartrate 25 MG tablet   Commonly known as:  LOPRESSOR   Used for:  Chronic atrial fibrillation (H)        Dose:  25 mg   Take 1 tablet (25 mg) by mouth 2 times daily   Quantity:  60 tablet   Refills:  0       potassium chloride 20 MEQ Packet   Commonly known as:  KLOR-CON   Used for:  Chronic atrial fibrillation (H)        Dose:  40 mEq   Take 40 mEq by mouth daily   Quantity:  60 tablet   Refills:  0         CONTINUE these medicines which have NOT CHANGED        Dose / Directions    bisacodyl 10 MG Suppository   Commonly known as:  DULCOLAX   Indication:   Constipation        Dose:  10 mg   Place 10 mg rectally every 3 days PRN   Refills:  0       * COUMADIN PO        Dose:  2.5 mg   Take 2.5 mg by mouth Tu, Th   Refills:  0       * COUMADIN PO        Dose:  5 mg   Take 5 mg by mouth M, W, F, Sa, Puga   Refills:  0       guaiFENesin-dextromethorphan 100-10 MG/5ML syrup   Commonly known as:  ROBITUSSIN DM        Dose:  5 mL   Take 5 mLs by mouth every 4 hours as needed for cough Take 5 to 10 mls q 4 hours as needed   Refills:  0       HYDROmorphone 2 MG tablet   Commonly known as:  DILAUDID   Used for:  Hospice care patient        Dose:  2 mg   Take 1 tablet (2 mg) by mouth every 4 hours as needed for moderate to severe pain 2 to 4 mg oral every 4 hours prn pain   Quantity:  10 tablet   Refills:  0       KONSYL DAILY FIBER 100 % Powd   Generic drug:  psyllium        Dose:  1 Tablespoonful   Take 1 Tablespoonful by mouth daily One tablespoon mixed with juice   Refills:  0       LEVETIRACETAM PO        Dose:  750 mg   Take 750 mg by mouth 2 times daily   Refills:  0       LIPITOR PO        Dose:  40 mg   Take 40 mg by mouth daily   Refills:  0       magnesium hydroxide 400 MG/5ML suspension   Commonly known as:  MILK OF MAGNESIA        Dose:  30 mL   Take 30 mLs by mouth every 3 days PRN   Refills:  0       PREDNISONE PO        Dose:  5 mg   Take 5 mg by mouth daily   Refills:  0       RISPERDAL PO        Dose:  0.25 mg   Take 0.25 mg by mouth 2 times daily   Refills:  0       senna-docusate 8.6-50 MG per tablet   Commonly known as:  SENOKOT-S;PERICOLACE        Dose:  1 tablet   Take 1 tablet by mouth 2 times daily   Refills:  0       SERTRALINE HCL PO        Dose:  75 mg   Take 75 mg by mouth daily   Refills:  0       * TYLENOL PO        Dose:  650 mg   Take 650 mg by mouth 2 times daily   Refills:  0       * ACETAMINOPHEN PO        Dose:  650 mg   Take 650 mg by mouth every 6 hours as needed for pain   Refills:  0       Vitamin D (Cholecalciferol) 1000 units Caps   Used  for:  Healthcare maintenance        Dose:  2000 Units   Take 2,000 Units by mouth daily   Quantity:  30 capsule   Refills:  0       * Notice:  This list has 4 medication(s) that are the same as other medications prescribed for you. Read the directions carefully, and ask your doctor or other care provider to review them with you.         Where to get your medicines      Some of these will need a paper prescription and others can be bought over the counter. Ask your nurse if you have questions.     Bring a paper prescription for each of these medications     diltiazem 90 MG tablet    HYDROmorphone 2 MG tablet    levofloxacin 500 MG tablet    metoprolol tartrate 25 MG tablet    potassium chloride 20 MEQ Packet                Protect others around you: Learn how to safely use, store and throw away your medicines at www.disposemymeds.org.        ANTIBIOTIC INSTRUCTION     You've Been Prescribed an Antibiotic - Now What?  Your healthcare team thinks that you or your loved one might have an infection. Some infections can be treated with antibiotics, which are powerful, life-saving drugs. Like all medications, antibiotics have side effects and should only be used when necessary. There are some important things you should know about your antibiotic treatment.      Your healthcare team may run tests before you start taking an antibiotic.    Your team may take samples (e.g., from your blood, urine or other areas) to run tests to look for bacteria. These test can be important to determine if you need an antibiotic at all and, if you do, which antibiotic will work best.      Within a few days, your healthcare team might change or even stop your antibiotic.    Your team may start you on an antibiotic while they are working to find out what is making you sick.    Your team might change your antibiotic because test results show that a different antibiotic would be better to treat your infection.    In some cases, once your team has  more information, they learn that you do not need an antibiotic at all. They may find out that you don't have an infection, or that the antibiotic you're taking won't work against your infection. For example, an infection caused by a virus can't be treated with antibiotics. Staying on an antibiotic when you don't need it is more likely to be harmful than helpful.      You may experience side effects from your antibiotic.    Like all medications, antibiotics have side effects. Some of these can be serious.    Let you healthcare team know if you have any known allergies when you are admitted to the hospital.    One significant side effect of nearly all antibiotics is the risk of severe and sometimes deadly diarrhea caused by Clostridium difficile (C. Difficile). This occurs when a person takes antibiotics because some good germs are destroyed. Antibiotic use allows C. diificile to take over, putting patients at high risk for this serious infection.    As a patient or caregiver, it is important to understand your or your loved one's antibiotic treatment. It is especially important for caregivers to speak up when patients can't speak for themselves. Here are some important questions to ask your healthcare team.    What infection is this antibiotic treating and how do you know I have that infection?    What side effects might occur from this antibiotic?    How long will I need to take this antibiotic?    Is it safe to take this antibiotic with other medications or supplements (e.g., vitamins) that I am taking?     Are there any special directions I need to know about taking this antibiotic? For example, should I take it with food?    How will I be monitored to know whether my infection is responding to the antibiotic?    What tests may help to make sure the right antibiotic is prescribed for me?      Information provided by:  www.cdc.gov/getsmart  U.S. Department of Health and Human Services  Centers for disease Control  and Prevention  National Center for Emerging and Zoonotic Infectious Diseases  Division of Healthcare Quality Promotion        Information about OPIOIDS     PRESCRIPTION OPIOIDS: WHAT YOU NEED TO KNOW   We gave you an opioid (narcotic) pain medicine. It is important to manage your pain, but opioids are not always the best choice. You should first try all the other options your care team gave you. Take this medicine for as short a time (and as few doses) as possible.    Some activities can increase your pain, such as bandage changes or therapy sessions. It may help to take your pain medicine 30 to 60 minutes before these activities. Reduce your stress by getting enough sleep, working on hobbies you enjoy and practicing relaxation or meditation. Talk to your care team about ways to manage your pain beyond prescription opioids.    These medicines have risks:    DO NOT drive when on new or higher doses of pain medicine. These medicines can affect your alertness and reaction times, and you could be arrested for driving under the influence (DUI). If you need to use opioids long-term, talk to your care team about driving.    DO NOT operate heavy machinery    DO NOT do any other dangerous activities while taking these medicines.    DO NOT drink any alcohol while taking these medicines.     If the opioid prescribed includes acetaminophen, DO NOT take with any other medicines that contain acetaminophen. Read all labels carefully. Look for the word  acetaminophen  or  Tylenol.  Ask your pharmacist if you have questions or are unsure.    You can get addicted to pain medicines, especially if you have a history of addiction (chemical, alcohol or substance dependence). Talk to your care team about ways to reduce this risk.    All opioids tend to cause constipation. Drink plenty of water and eat foods that have a lot of fiber, such as fruits, vegetables, prune juice, apple juice and high-fiber cereal. Take a laxative (Miralax, milk  of magnesia, Colace, Senna) if you don t move your bowels at least every other day. Other side effects include upset stomach, sleepiness, dizziness, throwing up, tolerance (needing more of the medicine to have the same effect), physical dependence and slowed breathing.    Store your pills in a secure place, locked if possible. We will not replace any lost or stolen medicine. If you don t finish your medicine, please throw away (dispose) as directed by your pharmacist. The [place] Pollution Control Agency has more information about safe disposal: https://www.[facility].us/living-green/managing-unwanted-medications             Medication List: This is a list of all your medications and when to take them. Check marks below indicate your daily home schedule. Keep this list as a reference.      Medications           Morning Afternoon Evening Bedtime As Needed    bisacodyl 10 MG Suppository   Commonly known as:  DULCOLAX   Place 10 mg rectally every 3 days PRN                                * COUMADIN PO   Take 2.5 mg by mouth Tu, Th   Last time this was given:  1 mg on 10/29/2018  6:11 PM                                * COUMADIN PO   Take 5 mg by mouth M, W, F, Sa, Puga   Last time this was given:  1 mg on 10/29/2018  6:11 PM                                diltiazem 90 MG tablet   Commonly known as:  CARDIZEM   Take 1 tablet (90 mg) by mouth every 6 hours   Last time this was given:  60 mg on 10/31/2018  5:55 AM                                guaiFENesin-dextromethorphan 100-10 MG/5ML syrup   Commonly known as:  ROBITUSSIN DM   Take 5 mLs by mouth every 4 hours as needed for cough Take 5 to 10 mls q 4 hours as needed                                HYDROmorphone 2 MG tablet   Commonly known as:  DILAUDID   Take 1 tablet (2 mg) by mouth every 4 hours as needed for moderate to severe pain 2 to 4 mg oral every 4 hours prn pain   Last time this was given:  2 mg on 10/30/2018  3:48 AM                                KONSYL  DAILY FIBER 100 % Powd   Take 1 Tablespoonful by mouth daily One tablespoon mixed with juice   Generic drug:  psyllium                                LEVETIRACETAM PO   Take 750 mg by mouth 2 times daily   Last time this was given:  750 mg on 10/31/2018  9:22 AM                                levofloxacin 500 MG tablet   Commonly known as:  LEVAQUIN   Take 1 tablet (500 mg) by mouth daily                                LIPITOR PO   Take 40 mg by mouth daily   Last time this was given:  40 mg on 10/30/2018  5:53 PM                                magnesium hydroxide 400 MG/5ML suspension   Commonly known as:  MILK OF MAGNESIA   Take 30 mLs by mouth every 3 days PRN                                metoprolol tartrate 25 MG tablet   Commonly known as:  LOPRESSOR   Take 1 tablet (25 mg) by mouth 2 times daily   Last time this was given:  25 mg on 10/31/2018  9:22 AM                                potassium chloride 20 MEQ Packet   Commonly known as:  KLOR-CON   Take 40 mEq by mouth daily   Last time this was given:  40 mEq on 10/31/2018  9:23 AM                                PREDNISONE PO   Take 5 mg by mouth daily   Last time this was given:  5 mg on 10/31/2018  9:22 AM                                RISPERDAL PO   Take 0.25 mg by mouth 2 times daily   Last time this was given:  0.25 mg on 10/31/2018  9:21 AM                                senna-docusate 8.6-50 MG per tablet   Commonly known as:  SENOKOT-S;PERICOLACE   Take 1 tablet by mouth 2 times daily   Last time this was given:  1 tablet on 10/31/2018  9:23 AM                                SERTRALINE HCL PO   Take 75 mg by mouth daily   Last time this was given:  75 mg on 10/31/2018  9:21 AM                                * TYLENOL PO   Take 650 mg by mouth 2 times daily   Last time this was given:  650 mg on 10/30/2018  9:01 PM                                * ACETAMINOPHEN PO   Take 650 mg by mouth every 6 hours as needed for pain   Last time this was given:   650 mg on 10/30/2018  9:01 PM                                Vitamin D (Cholecalciferol) 1000 units Caps   Take 2,000 Units by mouth daily                                * Notice:  This list has 4 medication(s) that are the same as other medications prescribed for you. Read the directions carefully, and ask your doctor or other care provider to review them with you.              More Information        Pneumonia (Adult)  Pneumonia is an infection deep within the lungs. It is in the small air sacs (alveoli). Pneumonia may be caused by a virus or bacteria. Pneumonia caused by bacteria is usually treated with an antibiotic. Severe cases may need to be treated in the hospital. Milder cases can be treated at home. Symptoms usually start to get better during the first 2 days of treatment.    Home care  Follow these guidelines when caring for yourself at home:    Rest at home for the first 2 to 3 days, or until you feel stronger. Don t let yourself get overly tired when you go back to your activities.    Stay away from cigarette smoke - yours or other people s.    You may use acetaminophen or ibuprofen to control fever or pain, unless another medicine was prescribed. If you have chronic liver or kidney disease, talk with your healthcare provider before using these medicines. Also talk with your provider if you ve had a stomach ulcer or gastrointestinal bleeding. Don t give aspirin to anyone younger than 18 years of age who is ill with a fever. It may cause severe liver damage.    Your appetite may be poor, so a light diet is fine.    Drink 6 to 8 glasses of fluids every day to make sure you are getting enough fluids. Beverages can include water, sport drinks, sodas without caffeine, juices, tea, or soup. Fluids will help loosen secretions in the lung. This will make it easier for you to cough up the phlegm (sputum). If you also have heart or kidney disease, check with your healthcare provider before you drink extra  fluids.    Take antibiotic medicine prescribed until it is all gone, even if you are feeling better after a few days.  Follow-up care  Follow up with your healthcare provider in the next 2 to 3 days, or as advised. This is to be sure the medicine is helping you get better.  If you are 65 or older, you should get a pneumococcal vaccine and a yearly flu (influenza) shot. You should also get these vaccines if you have chronic lung disease like asthma, emphysema, or COPD. Recently, a second type of pneumonia vaccine has become available for everyone over 65 years old. This is in addition to the previous vaccine. Ask your provider about this.  When to seek medical advice  Call your healthcare provider right away if any of these occur:    You don t get better within the first 48 hours of treatment    Shortness of breath gets worse    Rapid breathing (more than 25 breaths per minute)    Coughing up blood    Chest pain gets worse with breathing    Fever of 100.4 F (38 C) or higher that doesn t get better with fever medicine    Weakness, dizziness, or fainting that gets worse    Thirst or dry mouth that gets worse    Sinus pain, headache, or a stiff neck    Chest pain not caused by coughing  Date Last Reviewed: 1/1/2017 2000-2017 [employer]. [address] [place] [address]. All rights reserved. This information is not intended as a substitute for professional medical care. Always follow your healthcare professional's instructions.                Understanding Atrial Fibrillation    An arrhythmia is any problem with the speed or pattern of the heartbeat. Atrial fibrillation (AFib) is a common type of arrhythmia. It causes fast, chaotic electrical signals in the atria. This leads to poor functioning of the heart. It also affects how much blood your heart can pump out to the body.  Afib may occur once in a while and go away on its own. Or it may continue for longer periods and need treatment.  AFib  can lead to serious problems, such as stroke. Your healthcare provider will need to monitor and manage it.  What happens during atrial fibrillation?   The heart has an electrical system that sends signals to control the heartbeat. As signals move through the heart, they tell the heart s upper chambers (atria) and lower chambers (ventricles) when to squeeze (contract) and relax. This lets blood move through the heart and out to the body and lungs.  With AFib, the atria receive abnormal signals. This causes them to contract in a fast and irregular way, and out of sync with the ventricles. When this happens, the atria also have a harder time moving blood into the ventricles. Blood may then pool in the atria, which increases the risk for blood clots and stroke. The ventricles also may contract too quickly and irregularly. As a result, they may not pump blood to the body and lungs as well as they should. This can weaken the heart muscle over time and cause heart failure.  What causes atrial fibrillation?  AFib is more common in older adults. It has many possible causes including:    Coronary artery disease    Heart valve disease    Heart attack    Heart surgery    High blood pressure    Thyroid disease    Diabetes    Lung disease    Sleep apnea    Heavy alcohol use  In some cases of AFib, doctors do not know the cause.  What are the symptoms of atrial fibrillation?  AFib may or may not cause symptoms. If symptoms do occur, they may include:    A fast, pounding, irregular heartbeat    Shortness of breath    Tiredness    Dizziness or fainting    Chest pain  How is atrial fibrillation treated?  Treatments for AFib can include any of the options below.    Medicines. You may be prescribed:  ? Heart rate medicines to help slow down the heartbeat  ? Heart rhythm medicines to help the heart beat more regularly  ? Anti-clotting medicines to help reduce the risk for blood clots and stroke    Electrical cardioversion. Your  healthcare provider uses special pads or paddles to send one or more brief electrical shocks to the heart. This can help reset the heartbeat to normal.    Ablation. Long, thin tubes called catheters are threaded through a blood vessel to the heart. There, the catheters send out hot or cold energy to the areas causing the abnormal signals. This energy destroys the problem tissue or cells. This improves the chances that your heart will stay in normal rhythm without using medicines. If your heart rate and rhythm can t be controlled, you may need ablation and a pacemaker. These will help control the heart rate and regularity of the heartbeat.    Surgery. During surgery, your healthcare provider may use different methods to create scar tissue in the areas of the heart causing the abnormal signals. The scar tissue disrupts the abnormal signals and may stop AFib from occurring.  What are the complications of atrial fibrillation?  These can include:    Blood clots    Stroke    Heart failure. This problem occurs when the heart muscle weakens so much that it can no longer pump blood well.  When should I call my healthcare provider?  Call your healthcare provider right away if you have any of these:    Symptoms that don t get better with treatment, or get worse    New symptoms   Date Last Reviewed: 5/1/2016 2000-2017 [employer]. [address] [place] [address]. All rights reserved. This information is not intended as a substitute for professional medical care. Always follow your healthcare professional's instructions.                Discharge Instructions for Atrial Fibrillation  You have been diagnosed with an abnormal heart rhythm called atrial fibrillation. With this condition, your heart s 2 upper chambers quiver rather than squeeze the blood out in a normal pattern. This leads to an irregular and sometimes rapid heartbeat. Some people will develop associated symptoms such as a flip-flopping  heartbeat, chest pain, lightheadedness, or shortness of breath. Other people may have no symptoms at all. Atrial fibrillation is serious because it affects the heart s ability to fill with blood as it should. Blood clots may form. This increases the risk for stroke. Untreated atrial fibrillation can also lead to heart failure. Atrial fibrillation can be controlled. With treatment, most people with atrial fibrillation lead normal lives.  Treatment options  Recommended treatment for atrial fibrillation depends on your age, symptoms, how long you have had atrial fibrillation, and other factors. You will have a complete evaluation to find out if you have any abnormalities that caused your heart to go into atrial fibrillation. This might be blocked heart arteries or a thyroid problem. Your doctor will assess your particular case and discuss choices with you.  Treatment choices may include:    Treating an underlying disorder that puts you at risk for atrial fibrillation. For example, correcting an abnormal thyroid or electrolyte problem, or treating a blocked heart artery.    Restoring a normal heart rhythm with an electrical shock (cardioversion) or with an antiarrhythmic medicine (chemical cardioversion)    Using medicine to control your heart rate in atrial fibrillation.    Preventing the risk for blood clot and stroke using blood-thinning medicines. Your doctor will tell you what he or she recommends. Choices may include aspirin, clopidogrel, warfarin, dabigatran, rivaroxaban, apixaban, and edoxaban.    Doing catheter ablation or a surgical maze procedure. These use different methods to destroy certain areas of heart tissue. This interrupts the electrical signals causing atrial fibrillation. One of these procedures may be a choice when medicines do not work, or as an alternative to long-term medicine.    Other treatment choices may be recommended for you by your doctor.  Managing risk factors for stroke and preventing  heart failure are important parts of any treatment plan for atrial fibrillation.  Home care    Take your medicines exactly as directed. Don t skip doses.    Work with your doctor to find the right medicines and doses for you.    Learn to take your own pulse. Keep a record of your results. Ask your doctor which pulse rates mean that you need medical attention. Slowing your pulse is often the goal of treatment. Ask your doctor if it s OK for you to use an automatic machine to check your pulse at home. Sometimes these machines don t count the pulse correctly when you have atrial fibrillation.    Limit your intake of coffee, tea, cola, and other beverages with caffeine. Talk with your doctor about whether you should eliminate caffeine.    Avoid over-the-counter medicines that have caffeine in them.    Let your doctor know what medicines you take, including prescription and over-the-counter medicines, as well as any supplements. They interfere with some medicines given for atrial fibrillation.    Ask your doctor about whether you can drink alcohol. Some people need to avoid alcohol to better treat atrial fibrillation. If you are taking blood-thinner medicines, alcohol may interfere with them by increasing their effect.    Never take stimulants such as amphetamines or cocaine. These drugs can speed up your heart rate and trigger atrial fibrillation.  Follow-up care  Follow up with your doctor, or as advised.     When should I call my healthcare provider  Call your healthcare provider right away if you have any of the following:    Weakness    Dizziness    Fainting    Fatigue    Shortness of breath    Chest pain with increased activity    A change in the usual regularity of your heartbeat, or an unusually fast heartbeat   Date Last Reviewed: 4/23/2016 2000-2017 [employer]. [address] [place] [address], [place] [address]. All rights reserved. This information is not intended as a substitute for professional  medical care. Always follow your healthcare professional's instructions.                Diltiazem tablets  Brand Name: Cardizem  What is this medicine?  DILTIAZEM (dil TASHA rivas) is a calcium-channel blocker. It affects the amount of calcium found in your heart and muscle cells. This relaxes your blood vessels, which can reduce the amount of work the heart has to do. This medicine is used to treat chest pain caused by angina.  How should I use this medicine?  Take this medicine by mouth with a glass of water. Follow the directions on the prescription label. Do not cut, crush or chew this medicine. This medicine is usually taken before meals and at bedtime. Take your doses at regular intervals. Do not take your medicine more often then directed. Do not stop taking except on the advice of your doctor or health care professional.  Talk to your pediatrician regarding the use of this medicine in children. Special care may be needed.  What side effects may I notice from receiving this medicine?  Side effects that you should report to your doctor or health care professional as soon as possible:    allergic reactions like skin rash, itching or hives, swelling of the face, lips, or tongue    confusion, mental depression    feeling faint or lightheaded, falls    pinpoint red spots on the skin    redness, blistering, peeling or loosening of the skin, including inside the mouth    slow, irregular heartbeat    swelling of the ankles, feet    unusual bleeding or bruising  Side effects that usually do not require medical attention (report to your doctor or health care professional if they continue or are bothersome):    change in sex drive or performance    constipation or diarrhea    flushing of the face    headache    nausea, vomiting    tired or weak    trouble sleeping  What may interact with this medicine?  Do not take this medicine with any of the following:    cisapride    hawthorn    pimozide    ranolazine    red yeast  rice  This medicine may also interact with the following medications:    buspirone    carbamazepine    cimetidine    cyclosporine    digoxin    local anesthetics or general anesthetics    lovastatin    medicines for anxiety or difficulty sleeping like midazolam and triazolam    medicines for high blood pressure or heart problems    quinidine    rifampin, rifabutin, or rifapentine  What if I miss a dose?  If you miss a dose, take it as soon as you can. If it is almost time for your next dose, take only that dose. Do not take double or extra doses.  Where should I keep my medicine?  Keep out of the reach of children.  Store at room temperature between 20 and 25 degrees C (68 and 77 degrees F). Protect from light. Keep container tightly closed. Throw away any unused medicine after the expiration date.  What should I tell my health care provider before I take this medicine?  They need to know if you have any of these conditions:    heart problems, low blood pressure, irregular heartbeat    liver disease    previous heart attack    an unusual or allergic reaction to diltiazem, other medicines, foods, dyes, or preservatives    pregnant or trying to get pregnant    breast-feeding  What should I watch for while using this medicine?  Check your blood pressure and pulse rate regularly. Ask your doctor or health care professional what your blood pressure and pulse rate should be and when you should contact him or her.  You may feel dizzy or lightheaded. Do not drive, use machinery, or do anything that needs mental alertness until you know how this medicine affects you. To reduce the risk of dizzy or fainting spells, do not sit or stand up quickly, especially if you are an older patient. Alcohol can make you more dizzy or increase flushing and rapid heartbeats. Avoid alcoholic drinks.  NOTE:This sheet is a summary. It may not cover all possible information. If you have questions about this medicine, talk to your doctor,  pharmacist, or health care provider. Copyright  2018 [employer]                Levofloxacin tablets  Brand Names: Levaquin, Levaquin Leva-Maximilian  What is this medicine?  LEVOFLOXACIN (darlene maurofreddy FLOX a sin) is a quinolone antibiotic. It is used to treat certain kinds of bacterial infections. It will not work for colds, flu, or other viral infections.  How should I use this medicine?  Take this medicine by mouth with a full glass of water. Follow the directions on the prescription label. This medicine can be taken with or without food. Take your medicine at regular intervals. Do not take your medicine more often than directed. Do not skip doses or stop your medicine early even if you feel better. Do not stop taking except on your doctor's advice.  A special MedGuide will be given to you by the pharmacist with each prescription and refill. Be sure to read this information carefully each time.  Talk to your pediatrician regarding the use of this medicine in children. While this drug may be prescribed for children as young as 6 months for selected conditions, precautions do apply.  What side effects may I notice from receiving this medicine?  Side effects that you should report to your doctor or health care professional as soon as possible:    allergic reactions like skin rash or hives, swelling of the face, lips, or tongue    anxious    breathing problems    confusion    depressed mood    diarrhea    dizziness    fast, irregular heartbeat    hallucination, loss of contact with reality    joint, muscle, or tendon pain or swelling    muscle weakness    pain, tingling, numbness in the hands or feet    seizures    signs and symptoms of high blood sugar such as dizziness; dry mouth; dry skin; fruity breath; nausea; stomach pain; increased hunger or thirst; increased urination    signs and symptoms of liver injury like dark yellow or brown urine; general ill feeling or flu-like symptoms; light-colored stools; loss of appetite;  nausea; right upper belly pain; unusually weak or tired; yellowing of the eyes or skin    signs and symptoms of low blood sugar such as feeling anxious; confusion; dizziness; increased hunger; unusually weak or tired; sweating; shakiness; cold; irritable; headache; blurred vision; fast heartbeat; loss of consciousness    suicidal thoughts or other mood changes    sunburn    unusually weak or tired  Side effects that usually do not require medical attention (report to your doctor or health care professional if they continue or are bothersome):    constipation    dry mouth    headache    nausea, vomiting    trouble sleeping  What may interact with this medicine?  Do not take this medicine with any of the following medications:    bepridil    certain medicines for depression, anxiety, or psychotic disturbances like pimozide, thioridazine, and ziprasidone    certain medicines for irregular heart beat like dofetilide and dronedarone    cisapride    halofantrine  This medicine may also interact with the following medications:    antacids    birth control pills    certain medicines for diabetes, like glipizide, glyburide, or insulin    didanosine buffered tablets or powder    multivitamins    NSAIDS, medicines for pain and inflammation, like ibuprofen or naproxen    steroid medicines like prednisone or cortisone    sucralfate    theophylline    warfarin  What if I miss a dose?  If you miss a dose, take it as soon as you remember. If it is almost time for your next dose, take only that dose. Do not take double or extra doses.  Where should I keep my medicine?  Keep out of the reach of children.  Store at room temperature between 15 and 30 degrees C (59 and 86 degrees F). Keep in a tightly closed container. Throw away any unused medicine after the expiration date.  What should I tell my health care provider before I take this medicine?  They need to know if you have any of these conditions:    bone  problems    diabetes    history of low levels of potassium in the blood    irregular heartbeat    joint problems    kidney disease    liver disease    myasthenia gravis    seizures    tendon problems    tingling of the fingers or toes, or other nerve disorder    an unusual or allergic reaction to levofloxacin, other quinolone antibiotics, foods, dyes, or preservatives    pregnant or trying to get pregnant    breast-feeding  What should I watch for while using this medicine?  Tell your doctor or healthcare professional if your symptoms do not start to get better or if they get worse.  Do not treat diarrhea with over the counter products. Contact your doctor if you have diarrhea that lasts more than 2 days or if it is severe and watery.  Check with your doctor or health care professional if you get an attack of severe diarrhea, nausea and vomiting, or if you sweat a lot. The loss of too much body fluid can make it dangerous for you to take this medicine.  You may get drowsy or dizzy. Do not drive, use machinery, or do anything that needs mental alertness until you know how this medicine affects you. Do not sit or stand up quickly, especially if you are an older patient. This reduces the risk of dizzy or fainting spells.  This medicine can make you more sensitive to the sun. Keep out of the sun. If you cannot avoid being in the sun, wear protective clothing and use a sunscreen. Do not use sun lamps or tanning beds/booths. Contact your doctor if you get a sunburn.  If you are a diabetic monitor your blood glucose carefully. If you get an unusual reading stop taking this medicine and call your doctor right away.  Avoid antacids, calcium, iron, and zinc products for 2 hours before and 2 hours after taking a dose of this medicine.  NOTE:This sheet is a summary. It may not cover all possible information. If you have questions about this medicine, talk to your doctor, pharmacist, or health care provider. Copyright  2018  [employer]ose [employer]                Metoprolol tablets  Brand Name: Lopressor  What is this medicine?  METOPROLOL (me TOE proe lole) is a beta-blocker. Beta-blockers reduce the workload on the heart and help it to beat more regularly. This medicine is used to treat high blood pressure and to prevent chest pain. It is also used to after a heart attack and to prevent an additional heart attack from occurring.  How should I use this medicine?  Take this medicine by mouth with a drink of water. Follow the directions on the prescription label. Take this medicine immediately after meals. Take your doses at regular intervals. Do not take more medicine than directed. Do not stop taking this medicine suddenly. This could lead to serious heart-related effects.  Talk to your pediatrician regarding the use of this medicine in children. Special care may be needed.  What side effects may I notice from receiving this medicine?  Side effects that you should report to your doctor or health care professional as soon as possible:    allergic reactions like skin rash, itching or hives    cold or numb hands or feet    depression    difficulty breathing    faint    fever with sore throat    irregular heartbeat, chest pain    rapid weight gain    swollen legs or ankles  Side effects that usually do not require medical attention (report to your doctor or health care professional if they continue or are bothersome):    anxiety or nervousness    change in sex drive or performance    dry skin    headache    nightmares or trouble sleeping    short term memory loss    stomach upset or diarrhea    unusually tired  What may interact with this medicine?  This medicine may interact with the following medications:    certain medicines for blood pressure, heart disease, irregular heart beat    certain medicines for depression like monoamine oxidase (MAO) inhibitors, fluoxetine, or  paroxetine    clonidine    dobutamine    epinephrine    isoproterenol    reserpine  What if I miss a dose?  If you miss a dose, take it as soon as you can. If it is almost time for your next dose, take only that dose. Do not take double or extra doses.  Where should I keep my medicine?  Keep out of the reach of children.  Store at room temperature between 15 and 30 degrees C (59 and 86 degrees F). Throw away any unused medicine after the expiration date.  What should I tell my health care provider before I take this medicine?  They need to know if you have any of these conditions:    diabetes    heart or vessel disease like slow heart rate, worsening heart failure, heart block, sick sinus syndrome or Raynaud's disease    kidney disease    liver disease    lung or breathing disease, like asthma or emphysema    pheochromocytoma    thyroid disease    an unusual or allergic reaction to metoprolol, other beta-blockers, medicines, foods, dyes, or preservatives    pregnant or trying to get pregnant    breast-feeding  What should I watch for while using this medicine?  Visit your doctor or health care professional for regular check ups. Contact your doctor right away if your symptoms worsen. Check your blood pressure and pulse rate regularly. Ask your health care professional what your blood pressure and pulse rate should be, and when you should contact them.  You may get drowsy or dizzy. Do not drive, use machinery, or do anything that needs mental alertness until you know how this medicine affects you. Do not sit or stand up quickly, especially if you are an older patient. This reduces the risk of dizzy or fainting spells. Contact your doctor if these symptoms continue. Alcohol may interfere with the effect of this medicine. Avoid alcoholic drinks.  NOTE:This sheet is a summary. It may not cover all possible information. If you have questions about this medicine, talk to your doctor, pharmacist, or health care provider.  Copyright  2018 [employer]                Patient Education    Potassium Acetate Solution for injection    Potassium Bicarbonate Effervescent tablet    Potassium Bicarbonate, Potassium Chloride Effervescent tablet    Potassium Chloride Effervescent tablet    Potassium Chloride Oral capsule, extended-release    Potassium Chloride Oral solution    Potassium Chloride Oral syrup    Potassium Chloride Oral tablet    Potassium Chloride Oral tablet, extended-release    Potassium Chloride Oral tablet, Micro-dispersible    Potassium Chloride Oral tablet, Wax matrix    Potassium Chloride Solution for injection    Potassium Chloride, Dextrose Solution for injection    Potassium Gluconate Oral solution    Potassium Gluconate Oral tablet  Potassium Chloride Oral tablet  What is this medicine?  POTASSIUM (leonid TASS i um) is a natural salt that is important for the heart, muscles, and nerves. It is found in many foods and is normally supplied by a well balanced diet. This medicine is used to treat low potassium.  This medicine may be used for other purposes; ask your health care provider or pharmacist if you have questions.  What should I tell my health care provider before I take this medicine?  They need to know if you have any of these conditions:    dehydration    diabetes    irregular heartbeat    kidney disease    stomach ulcers or other stomach problems    an unusual or allergic reaction to potassium salts, other medicines, foods, dyes, or preservatives    pregnant or trying to get pregnant    breast-feeding  How should I use this medicine?  Take this medicine by mouth with a full glass of water. Follow the directions on the prescription label. Take with food. Do not suck on, crush, or chew this medicine. If you have difficulty swallowing, ask the pharmacist how to take. Take your medicine at regular intervals. Do not take it more often than directed. Do not stop taking except on your doctor's advice.  Talk to your  pediatrician regarding the use of this medicine in children. Special care may be needed.  Overdosage: If you think you have taken too much of this medicine contact a poison control center or emergency room at once.  NOTE: This medicine is only for you. Do not share this medicine with others.  What if I miss a dose?  If you miss a dose, take it as soon as you can. If it is almost time for your next dose, take only that dose. Do not take double or extra doses.  What may interact with this medicine?  Do not take this medicine with any of the following medications:    eplerenone    sodium polystyrene sulfonate  This medicine may also interact with the following medications:    medicines for blood pressure or heart disease like lisinopril, losartan, quinapril, valsartan    medicines for cold or allergies    medicines for inflammation like ibuprofen, indomethacin    medicines for Parkinson's disease    medicines for the stomach like metoclopramide, dicyclomine, glycopyrrolate    some diuretics  This list may not describe all possible interactions. Give your health care provider a list of all the medicines, herbs, non-prescription drugs, or dietary supplements you use. Also tell them if you smoke, drink alcohol, or use illegal drugs. Some items may interact with your medicine.  What should I watch for while using this medicine?  Visit your doctor or health care professional for regular check ups. You will need lab work done regularly.  You may need to be on a special diet while taking this medicine. Ask your doctor.  What side effects may I notice from receiving this medicine?  Side effects that you should report to your doctor or health care professional as soon as possible:    allergic reactions like skin rash, itching or hives, swelling of the face, lips, or tongue    black, tarry stools    heartburn    irregular heartbeat    numbness or tingling in hands or feet    pain when swallowing    unusually weak or tired  Side  effects that usually do not require medical attention (report to your doctor or health care professional if they continue or are bothersome):    diarrhea    nausea    stomach gas    vomiting  This list may not describe all possible side effects. Call your doctor for medical advice about side effects. You may report side effects to FDA at 6-230-TLA-7568.  Where should I keep my medicine?  Keep out of the reach of children.  Store at room temperature between 15 and 30 degrees C (59 and 86 degrees F ). Keep bottle closed tightly to protect this medicine from light and moisture. Throw away any unused medicine after the expiration date.  NOTE:This sheet is a summary. It may not cover all possible information. If you have questions about this medicine, talk to your doctor, pharmacist, or health care provider. Copyright  2016 [employer]

## 2018-10-27 NOTE — PROGRESS NOTES
ARCAELIS SARAVIA  Patient visit during  hours.  Patient somewhat confused but she noted no spiritual care requests at this time.

## 2018-10-27 NOTE — ED PROVIDER NOTES
History     Chief Complaint   Patient presents with     Respiratory Distress     Patient is a 78 year old female presenting with shortness of breath. The history is provided by the nursing home.   Shortness of Breath   Severity:  Moderate  Onset quality:  Unable to specify  Timing:  Constant  Chronicity:  New  Associated symptoms: wheezing        Problem List:    Patient Active Problem List    Diagnosis Date Noted     Hospice care patient 01/07/2018     Priority: Medium        Past Medical History:    No past medical history on file.    Past Surgical History:    No past surgical history on file.    Family History:    No family history on file.    Social History:  Marital Status:   [2]  Social History   Substance Use Topics     Smoking status: Not on file     Smokeless tobacco: Not on file     Alcohol use Not on file        Medications:      Atorvastatin Calcium (LIPITOR PO)   guaiFENesin (ROBITUSSIN) 100 MG/5ML SYRP   HYDROmorphone (DILAUDID) 4 MG tablet   LEVETIRACETAM PO   PREDNISONE PO   RisperiDONE (RISPERDAL PO)   Warfarin Sodium (COUMADIN PO)   Warfarin Sodium (COUMADIN PO)   Acetaminophen (TYLENOL PO)   DiphenhydrAMINE HCl (BENADRYL PO)   Docusate Sodium (COLACE PO)   psyllium (KONSYL) 100 % POWD   senna-docusate (SENOKOT-S;PERICOLACE) 8.6-50 MG per tablet   SERTRALINE HCL PO   Vitamin D, Cholecalciferol, 1000 units CAPS         Review of Systems   Unable to perform ROS: Dementia   Respiratory: Positive for shortness of breath and wheezing.        Physical Exam   BP: (!) 123/108  Pulse: (!) 174  Temp: 97.8  F (36.6  C)  Resp: (!) 28  SpO2: (!) 80 %      Physical Exam   Constitutional: She appears well-developed and well-nourished. Face mask in place.   HENT:   Head: Normocephalic and atraumatic.   Mouth/Throat: No oropharyngeal exudate.   Eyes: Conjunctivae are normal. Pupils are equal, round, and reactive to light.   Neck: Normal range of motion. Neck supple. No JVD present. No tracheal deviation  present. No thyromegaly present.   Cardiovascular: Normal heart sounds and intact distal pulses.  An irregularly irregular rhythm present. Tachycardia present.  Exam reveals no gallop and no friction rub.    No murmur heard.  Pulmonary/Chest: Effort normal. No stridor. No respiratory distress. She has wheezes. She has rales. She exhibits no tenderness.   Abdominal: Soft. Bowel sounds are normal. She exhibits no distension and no mass. There is no tenderness. There is no rebound and no guarding.   Musculoskeletal: Normal range of motion. She exhibits edema. She exhibits no tenderness.   B/l leg edema   Lymphadenopathy:     She has no cervical adenopathy.   Neurological: She is alert.   Skin: Skin is warm and dry. No rash noted. No erythema. No pallor.   Psychiatric: Her behavior is normal.   Nursing note and vitals reviewed.      ED Course     ED Course     Procedures                 Results for orders placed or performed during the hospital encounter of 10/27/18 (from the past 24 hour(s))   CBC with platelets differential   Result Value Ref Range    WBC 6.0 4.0 - 11.0 10e9/L    RBC Count 4.71 3.8 - 5.2 10e12/L    Hemoglobin 13.9 11.7 - 15.7 g/dL    Hematocrit 41.6 35.0 - 47.0 %    MCV 88 78 - 100 fl    MCH 29.5 26.5 - 33.0 pg    MCHC 33.4 31.5 - 36.5 g/dL    RDW 14.6 10.0 - 15.0 %    Platelet Count 202 150 - 450 10e9/L    Diff Method Automated Method     % Neutrophils 51.7 %    % Lymphocytes 39.0 %    % Monocytes 5.6 %    % Eosinophils 2.8 %    % Basophils 0.7 %    % Immature Granulocytes 0.2 %    Nucleated RBCs 0 0 /100    Absolute Neutrophil 3.1 1.6 - 8.3 10e9/L    Absolute Lymphocytes 2.4 0.8 - 5.3 10e9/L    Absolute Monocytes 0.3 0.0 - 1.3 10e9/L    Absolute Eosinophils 0.2 0.0 - 0.7 10e9/L    Absolute Basophils 0.0 0.0 - 0.2 10e9/L    Abs Immature Granulocytes 0.0 0 - 0.4 10e9/L    Absolute Nucleated RBC 0.0    INR   Result Value Ref Range    INR 2.37 (H) 0.80 - 1.20       Medications   diltiazem (CARDIZEM) 25  MG/5ML injection (not administered)   ipratropium - albuterol 0.5 mg/2.5 mg/3 mL (DUONEB) neb solution 3 mL (3 mLs Nebulization Given 10/27/18 3650)   diltiazem (CARDIZEM) injection 15 mg (15 mg Intravenous Given 10/27/18 6012)       Assessments & Plan (with Medical Decision Making)   Sent from NH for SOB, tachycardia  In respiratory distress in ER  From NH, DNR  EKG; Afib RVR   Neb x 1, cardizem 15mg given  Symptoms partially impoved  Pt has new leg swelling, PCP planning to due US leg to rule DVT  CXR, labs, US ordered  S/o to DR Vieyra at the change of shift  Her eyes closes, open her eyes and smile but not able to have conversation    I have reviewed the nursing notes.    I have reviewed the findings, diagnosis, plan and need for follow up with the patient.      New Prescriptions    No medications on file       Final diagnoses:   None       10/27/2018   [facility]     Long Snow MD  10/27/18 3290

## 2018-10-27 NOTE — PLAN OF CARE
"Alert, disoriented x3. Unable to state name but is able to say \"yes\" when her name and  are stated for her. Denies pain, appears comfortable. Requiring 3LPM O2, sats in mid 90s. Upon admission to the floor apical pulse was irregular and as high as 160s. Dr. Griffith notified, orders for IV and oral metoprolol. Hx of a fib but does not appear to be on any rate control meds at home. Rate has since decreased into the 90s- low 100s. Lungs are dim and coarse. Bowels active and audible. Turn and reposition in bed q2 hour. Skin with blanchable redness on both heels, heels suspended with pillow. Some blanchable redness on bottom. Continuous pulse ox on, alarms active and audible. Will continue to monitor.    Face to face report given with opportunity to observe patient.    Report given to Klever Graves   10/27/2018  3:40 PM      "

## 2018-10-27 NOTE — ED NOTES
DATE:  10/27/2018   TIME OF RECEIPT FROM LAB:  0825    LAB TEST:  Lactic  LAB VALUE:  3.6  RESULTS GIVEN WITH READ-BACK TO (PROVIDER):  Dr. Vieyra  TIME LAB VALUE REPORTED TO PROVIDER:   7056

## 2018-10-27 NOTE — PLAN OF CARE
[facility] Inpatient Admission Note:    Patient admitted to 3114/3114-1 at approximately 1130 via cart accompanied by transport tech from emergency room . Report received from Neil KENDRICK in SBAR format at 11:05am via telephone. Patient transferred to bed via slide board.. Patient is alert and disoriented x3. Denies pain; rates at 0 on 0-10 scale.  Patient oriented to room, unit, hourly rounding, and plan of care. Explained admission packet and patient handbook with patient bill of rights brochure. Will continue to monitor and document as needed.     Inpatient Nursing criteria listed below was met:    Health care directives status obtained and documented: Yes    Care Everywhere authorization obtained No    MRSA swab completed for patient 65 years and older: Yes    Patient identifies a surrogate decision maker: No If yes, who: Contact Information:    Core Measure diagnosis present:Yes. If yes, state diagnosis: pneumonia     If initial lactic acid >2.0, repeat lactic acid drawn within one hour of arrival to unit: Yes. If no, state reason:     Vaccination assessment and education completed: Yes   Vaccinations received prior to admission: Pneumovax yes  Influenza(seasonal)  YES   Vaccination(s) ordered: N/A    Clergy visit ordered if patient requests: N/A    Skin issues/needs documented: Yes    Isolation Patient: no Education given, correct sign in place and documentation row added to PCS:      Fall Prevention Yes: Care plan updated, education given and documented, sticker and magnet in place: Yes    Care Plan initiated: Yes    Education Documented (including assessment): Yes    Patient has discharge needs : unknown If yes, please explain:

## 2018-10-27 NOTE — IP AVS SNAPSHOT
Danette Ramos #[id number] (CSN:[id number])  (78 year old F)  (Adm: 10/27/18)     [id number]               [facility]: [phone]            Patient Demographics     Patient Name Sex          Age SSN Address Contact Numbers    Danette Ramos Female 1940 (78 year old) [id number] [facility] [address]  [facility] [address] [phone]      Emergency Contact(s)     Name Relation Home Work Mobile    Anibal Ramos Spouse [phone]      Brooke Ramos Daughter [phone]    Brian Ramos Son [phone]      Admission Information     Attending Provider Admitting Provider Admission Type Admission Date/Time    Sekou Griffith MD Sharp, Sereen D, MD Emergency 10/27/18  0738    Discharge Date Hospital Service Auth/Cert Status Service Area     General Medicine Incomplete [facility]    Unit Room/Bed Admission Status       [facility] 3114/3114-1 Admission (Confirmed)       Admission     Complaint    Pneumonia, Pneumonia      Hospital Account     Name Acct ID Class Status Primary Coverage    Danette Ramos [id number] Inpatient Open MEDICARE - MEDICARE FOR HB SUPPLEMENT            Guarantor Account (for Hospital Account #[id number])     Name Relation to Pt Service Area Active? Acct Type    Danette Ramos Self [facility] Yes Personal/Family    Address Phone          [address] [place] [address] [phone](H)              Coverage Information (for Hospital Account #[id number])     1. MEDICARE/MEDICARE FOR HB SUPPLEMENT     F/O Payor/Plan Precert #    MEDICARE/MEDICARE FOR HB SUPPLEMENT     Subscriber Subscriber #    Danette Ramos [id number]    Address Phone    ATTN CLAIMS  [address]  [facility] [place] [address] [phone]          2. BCBS/BCBS PLATINUM BLUE     F/O Payor/Plan Precert #    BCBS/BCBS PLATINUM BLUE     Subscriber Subscriber #    Danette Ramos [id number]    Address Phone    [address] [address]  [place]  "[id number] [phone]          3. [place]     [place]     Subscriber Subscriber #    Danette Ramos [id number]    Address Phone    [address] [address]  [place] [phone]                                                      INTERAGENCY TRANSFER FORM - PHYSICIAN ORDERS   10/27/2018                       [facility]: [phone]            Attending Provider: Sekou Griffith MD     Allergies:  Bacitracin    Infection:  None   Service:  GENERAL MEDI    Ht:  1.702 m (5' 7\")   Wt:  78.5 kg (173 lb 1 oz)   Admission Wt:  78.5 kg (173 lb 1 oz)    BMI:  27.11 kg/m 2   BSA:  1.93 m 2            ED Clinical Impression     Diagnosis Description Comment Added By Time Added    Pneumonia [J18.9] Pneumonia [J18.9]  Brenda Vieyra MD 10/27/2018 10:12 AM      Hospital Problems as of 10/31/2018              Priority Class Noted POA    Pneumonia Medium  10/27/2018 Yes      Non-Hospital Problems as of 10/31/2018              Priority Class Noted    Hospice care patient Medium  1/7/2018      Code Status History     Date Active Date Inactive Code Status Order ID Comments User Context    10/31/2018 10:02 AM  DNR [id number]  Gamal Reyes MD Outpatient    10/27/2018  4:08 PM 10/31/2018 10:02 AM DNR [id number]  Sekou Griffith MD Inpatient      Current Code Status     Date Active Code Status Order ID Comments User Context       Prior      Summary of Visit     Reason for your hospital stay       pneumonia                Medication Review      START taking        Dose / Directions Comments    diltiazem 90 MG tablet   Commonly known as:  CARDIZEM   Used for:  Chronic atrial fibrillation (H)        Dose:  90 mg   Take 1 tablet (90 mg) by mouth every 6 hours   Quantity:  120 tablet   Refills:  0        levofloxacin 500 MG tablet   Commonly known as:  LEVAQUIN   Used for:  Community acquired pneumonia, unspecified laterality        Dose:  500 mg   Take 1 tablet (500 mg) " by mouth daily   Quantity:  7 tablet   Refills:  0        metoprolol tartrate 25 MG tablet   Commonly known as:  LOPRESSOR   Used for:  Chronic atrial fibrillation (H)        Dose:  25 mg   Take 1 tablet (25 mg) by mouth 2 times daily   Quantity:  60 tablet   Refills:  0        potassium chloride 20 MEQ Packet   Commonly known as:  KLOR-CON   Used for:  Chronic atrial fibrillation (H)        Dose:  40 mEq   Take 40 mEq by mouth daily   Quantity:  60 tablet   Refills:  0          CONTINUE these medications which have NOT CHANGED        Dose / Directions Comments    bisacodyl 10 MG Suppository   Commonly known as:  DULCOLAX   Indication:  Constipation        Dose:  10 mg   Place 10 mg rectally every 3 days PRN   Refills:  0        * COUMADIN PO        Dose:  2.5 mg   Take 2.5 mg by mouth Tu, Th   Refills:  0        * COUMADIN PO        Dose:  5 mg   Take 5 mg by mouth M, W, F, Sa, Puga   Refills:  0        guaiFENesin-dextromethorphan 100-10 MG/5ML syrup   Commonly known as:  ROBITUSSIN DM        Dose:  5 mL   Take 5 mLs by mouth every 4 hours as needed for cough Take 5 to 10 mls q 4 hours as needed   Refills:  0        HYDROmorphone 2 MG tablet   Commonly known as:  DILAUDID   Used for:  Hospice care patient        Dose:  2 mg   Take 1 tablet (2 mg) by mouth every 4 hours as needed for moderate to severe pain 2 to 4 mg oral every 4 hours prn pain   Quantity:  10 tablet   Refills:  0        KONSYL DAILY FIBER 100 % Powd   Generic drug:  psyllium        Dose:  1 Tablespoonful   Take 1 Tablespoonful by mouth daily One tablespoon mixed with juice   Refills:  0        LEVETIRACETAM PO        Dose:  750 mg   Take 750 mg by mouth 2 times daily   Refills:  0        LIPITOR PO        Dose:  40 mg   Take 40 mg by mouth daily   Refills:  0        magnesium hydroxide 400 MG/5ML suspension   Commonly known as:  MILK OF MAGNESIA        Dose:  30 mL   Take 30 mLs by mouth every 3 days PRN   Refills:  0        PREDNISONE PO         Dose:  5 mg   Take 5 mg by mouth daily   Refills:  0        RISPERDAL PO        Dose:  0.25 mg   Take 0.25 mg by mouth 2 times daily   Refills:  0        senna-docusate 8.6-50 MG per tablet   Commonly known as:  SENOKOT-S;PERICOLACE        Dose:  1 tablet   Take 1 tablet by mouth 2 times daily   Refills:  0        SERTRALINE HCL PO        Dose:  75 mg   Take 75 mg by mouth daily   Refills:  0        * TYLENOL PO        Dose:  650 mg   Take 650 mg by mouth 2 times daily   Refills:  0        * ACETAMINOPHEN PO        Dose:  650 mg   Take 650 mg by mouth every 6 hours as needed for pain   Refills:  0        Vitamin D (Cholecalciferol) 1000 units Caps   Used for:  Healthcare maintenance        Dose:  2000 Units   Take 2,000 Units by mouth daily   Quantity:  30 capsule   Refills:  0        * Notice:  This list has 4 medication(s) that are the same as other medications prescribed for you. Read the directions carefully, and ask your doctor or other care provider to review them with you.            After Care     Activity - Up with nursing assistance           Advance Diet as Tolerated       Follow this diet upon discharge: Orders Placed This Encounter      Room Service      Regular Diet Adult       Fall precautions           General info for SNF       Length of Stay Estimate: Long Term Care  Condition at Discharge: Stable  Level of care:skilled   Rehabilitation Potential: Poor  Admission H&P remains valid and up-to-date: Yes  Recent Chemotherapy: N/A  Use Nursing Home Standing Orders: Yes       Mantoux instructions       Give two-step Mantoux (PPD) Per Facility Policy Yes       Seizure precautions                 Referrals     Occupational Therapy Adult Consult       Evaluate and treat as clinically indicated.    Reason:  weakness       Physical Therapy Adult Consult       Evaluate and treat as clinically indicated.    Reason:  weakness             Follow-Up Appointment Instructions     Follow Up and recommended labs and  "tests       Follow up with group home physician.  The following labs/tests are recommended: INR 2-3 days.             Statement of Approval     Ordered          10/31/18 1002  I have reviewed and agree with all the recommendations and orders detailed in this document.  EFFECTIVE NOW     Approved and electronically signed by:  Gamal Reyes MD                                                 INTERAGENCY TRANSFER FORM - NURSING   10/27/2018                       [facility]: [phone]            Attending Provider: Sekou Griffith MD     Allergies:  Bacitracin    Infection:  None   Service:  GENERAL MEDI    Ht:  1.702 m (5' 7\")   Wt:  78.5 kg (173 lb 1 oz)   Admission Wt:  78.5 kg (173 lb 1 oz)    BMI:  27.11 kg/m 2   BSA:  1.93 m 2            Advance Directives        Scanned docmt in ACP Activity?           No scanned doc        Immunizations     Name Date      Influenza (H1N1) 02/01/10     Influenza (High Dose) 3 valent vaccine defer-10/31/18     Deferral:       Influenza (High Dose) 3 valent vaccine 10/03/12     Influenza (IIV3) PF 10/14/13     Influenza (IIV3) PF 10/17/11     Influenza (IIV3) PF 11/08/10     Influenza (IIV3) PF 09/24/09     Influenza (IIV3) PF 11/08/07     TD (ADULT, 7+) 02/01/10     TD (ADULT, 7+) 12/10/97       ASSESSMENT     Discharge Profile Flowsheet     EXPECTED DISCHARGE     COMMUNICATION ASSESSMENT      Expected Discharge Date  10/30/18 10/28/18 1447   Patient's communication style  spoken language (English or Bilingual) 10/27/18 0741    DISCHARGE NEEDS ASSESSMENT     SKIN      Concerns To Be Addressed  discharge planning concerns (needs agency transportation) 10/28/18 1447   Inspection of bony prominences  Full 10/31/18 1032    Patient/family verbalizes understanding of discharge plan recommendations?  Yes 10/28/18 1447   Full except areas not inspected   Spine;Hip, left;Hip, right;Buttock, left;Buttock, right;Sacrum;Coccyx 10/27/18 1645    Medical Team notified of plan?  " "yes 10/28/18 1447   Inspection under devices  Full 10/31/18 1032    Readmission Within The Last 30 Days  no previous admission in last 30 days 10/28/18 1447   Skin WDL  ex 10/31/18 1032    Transportation Available  agency transportation 10/28/18 1447   Skin Color/Characteristics  bruised (ecchymotic) 10/31/18 1032    Primary Care Clinic Name  [facility]  10/28/18 1447   Skin Temperature  warm 10/31/18 1032    Primary Care MD Name  Dr Taylor  10/28/18 1447   Skin Moisture  dry 10/31/18 1032    GASTROINTESTINAL (ADULT,PEDIATRIC,OB)     Skin Elasticity  quick return to original state 10/31/18 1032    GI WDL  WDL 10/31/18 1032   Skin Integrity  bruise(s);scab(s) 10/31/18 1032    All Quadrants Bowel Sounds  audible and active in all quadrants 10/31/18 1032   SAFETY      Last Bowel Movement  10/28/18 10/29/18 0147   Safety WDL  WDL 10/31/18 1032    Passing flatus  yes 10/31/18 0155   All Alarms  alarm(s) activated and audible 10/31/18 1032                 Assessment WDL (Within Defined Limits) Definitions           Safety WDL     Effective: 09/28/15    Row Information: <b>WDL Definition:</b> Bed in low position, wheels locked; call light in reach; upper side rails up x 2; ID band on<br> <font color=\"gray\"><i>Item=AS safety wdl>>List=AS safety wdl>>Version=F14</i></font>      Skin WDL     Effective: 09/28/15    Row Information: <b>WDL Definition:</b> Warm; dry; intact; elastic; without discoloration; pressure points without redness<br> <font color=\"gray\"><i>Item=AS skin wdl>>List=AS skin wdl>>Version=F14</i></font>      Vitals     Vital Signs Flowsheet     VITAL SIGNS     ANALGESIA SIDE EFFECTS MONITORING      Temp  98.9  F (37.2  C) 10/31/18 0744   Side Effects Monitoring: Respiratory Quality  R 10/30/18 2350    Temp src  Tympanic 10/30/18 2350   Side Effects Monitoring: Respiratory Depth  S 10/30/18 2350    Resp  15 10/31/18 0749   Side Effects Monitoring: Sedation Level  1 10/30/18 2350    Pulse  74 " "10/31/18 0744   GALILEA COMA SCALE      Heart Rate  83 10/31/18 0555   Best Eye Response  4-->(E4) spontaneous 10/30/18 2350    Pulse/Heart Rate Source  Monitor 10/31/18 0744   Best Motor Response  6-->(M6) obeys commands 10/30/18 2350    BP  132/60 10/31/18 0956   Best Verbal Response  4-->(V4) confused 10/30/18 2350    Pain Score  0 (None) (denies) 10/30/18 1648   [place] Coma Scale Score  14 10/30/18 2350    OXYGEN THERAPY     HEIGHT AND WEIGHT      SpO2  93 % 10/31/18 0744   Height  1.702 m (5' 7\") 10/27/18 1249    O2 Device  None (Room air) 10/31/18 0744   Height Method  Stated 10/27/18 1249    Oxygen Delivery  2 LPM (weaning to 1 lpm ) 10/29/18 0837   Weight  78.5 kg (173 lb 1 oz) 10/27/18 1249    PAIN/COMFORT     Weight Method  Bed scale 10/27/18 1249    Patient Currently in Pain  denies 10/31/18 1017   BSA (Calculated - sq m)  1.93 10/27/18 1249    FACES Pain Rating  4-->hurts little more 10/30/18 0535   BMI (Calculated)  27.16 10/27/18 1249    rFLACC Pain Rating: Face  1-->occasional grimace or frown, withdrawn, disinterested, appears sad or worried 10/31/18 0555   POSITIONING      rFLACC Pain Rating - Legs  0-->normal position or relaxed 10/31/18 0555   Body Position  turned;side-lying, right;legs elevated 10/31/18 0655    rFLACC Pain Rating: Activity  0-->lying quietly, normal position, moves easily 10/31/18 0555   Head of Bed (HOB)  HOB at 20 degrees 10/31/18 0655    rFLACC Pain Rating - Cry  0-->no cry (awake or asleep) 10/31/18 0555   Chair  Shifted weight 10/31/18 0027    rFLACC Pain Rating - Consolability  0-->content, relaxed 10/31/18 0555   Positioning/Transfer Devices  pillows;in use 10/31/18 0655    rFLACC Score: Activity  1 10/31/18 0555   DAILY CARE      Pain Location  Other (Comment) (Pt unable to report where) 10/28/18 1347   Activity Management  activity adjusted per tolerance 10/31/18 1032    Pain Descriptors  Headache 10/29/18 1819   Activity Assistance Provided  assistance, 2 people " 10/31/18 1032    Nonverbal Indicators Of Pain  anxious;grimace 10/30/18 1648   Assistive Device Utilized  mechanical lift 10/31/18 1032    Pain Intervention(s)  Repositioned 10/30/18 1648   EKG MONITORING      Response to Interventions  Absence of nonverbal indicators of pain 10/30/18 1648   Cardiac Rhythm  Atrial fibrillation 10/30/18 1648            Patient Lines/Drains/Airways Status    Active LINES/DRAINS/AIRWAYS     Name: Placement date: Placement time: Site: Days: Last dressing change:    Peripheral IV 10/29/18 Right Lower forearm 10/29/18   1852   Lower forearm   1             Patient Lines/Drains/Airways Status    Active PICC/CVC     None            Intake/Output Detail Report     Date Intake     Output Net    Shift P.O. I.V. IV Piggyback Total Urine Total       Noc 10/29/18 2300 - 10/30/18 0659 -- -- -- -- -- -- 0    Day 10/30/18 0700 - 10/30/18 9496 700 6757 -- 1604 -- -- 1604    Yuliya 10/30/18 1500 - 10/30/18 2259 360 334 -- 694 0 -- 694    Noc 10/30/18 2300 - 10/31/18 0659 -- 434 -- 434 -- -- 434    Day 10/31/18 0700 - 10/31/18 1459 -- -- -- -- -- -- 0      Last Void/BM       Most Recent Value    Urine Occurrence 1 at 10/31/2018 0600    Stool Occurrence 1 at 10/31/2018 0205      Case Management/Discharge Planning     Case Management/Discharge Planning Flowsheet     REFERRAL INFORMATION     Communication preferences  phone 10/28/18 1447    Did the Initial Social Work Assessment result in a Social Work Case?  Yes 10/28/18 1447   COPING/STRESS      Reason For Consult  discharge planning 10/28/18 1447   Major Change/Loss/Stressor  denies 10/27/18 1426    [facility]  10/28/18 1447   EXPECTED DISCHARGE      Primary Care MD Name  Dr Taylor  10/28/18 1447   Expected Discharge Date  10/30/18 10/28/18 1447    LIVING ENVIRONMENT     ASSESSMENT/CONCERNS TO BE ADDRESSED      Lives With  facility resident ([facility] ) 10/28/18 1447   Concerns To Be Addressed  discharge  planning concerns (needs agency transportation) 10/28/18 1447    Living Arrangements  extended care facility ([facility]) 10/28/18 1447   DISCHARGE PLANNING      Provides Primary Care For  no one, unable/limited ability to care for self 10/28/18 1447   Patient/family verbalizes understanding of discharge plan recommendations?  Yes 10/28/18 1447    Quality Of Family Relationships  involved;supportive 10/28/18 1447   Medical Team notified of plan?  yes 10/28/18 1447    Able to Return to Prior Living Arrangements  yes 10/28/18 1447   Readmission Within The Last 30 Days  no previous admission in last 30 days 10/28/18 1447    HOME SAFETY     Transportation Available  agency transportation 10/28/18 1447    Patient Feels Safe Living in Home?  yes 10/28/18 1447   ABUSE RISK SCREEN      ASSESSMENT OF FAMILY/SOCIAL SUPPORT     QUESTION TO PATIENT:  Has a member of your family or a partner(now or in the past) intimidated, hurt, manipulated, or controlled you in any way?  patient declined to answer or is unable to answer 10/27/18 0741    Marital Status   10/28/18 1447   QUESTION TO PATIENT: Do you feel safe going back to the place where you are living?  patient declined to answer or is unable to answer 10/27/18 0741    Who is your support system?  ;Facility resident(s)/Staff;Children 10/28/18 1447   OBSERVATION: Is there reason to believe there has been maltreatment of a vulnerable adult (ie. Physical/Sexual/Emotional abuse, self neglect, lack of adequate food, shelter, medical care, or financial exploitation)?  no 10/27/18 0741    Spouse's Name  Ainbal  10/28/18 1447   OTHER      Description of Support System  Involved;Supportive 10/28/18 1447   Are you depressed or being treated for depression?  Yes (per nursing home records) 10/27/18 1426    Support Assessment  Adequate family and caregiver support 10/28/18 1447                       [facility]: [phone]            Medication Administration  Report for Danette Ramos as of 10/31/18 1038   Legend:    Given Hold Not Given Due Canceled Entry Other Actions    Time Time (Time) Time  Time-Action       Inactive    Active    Linked        Medications 10/25/18 10/26/18 10/27/18 10/28/18 10/29/18 10/30/18 10/31/18    acetaminophen (TYLENOL) Suppository 650 mg  Dose: 650 mg  Freq: EVERY 4 HOURS PRN Route: RE  PRN Reason: mild pain  Start: 10/27/18 1525   Admin Instructions: Alternate ibuprofen (if ordered) with acetaminophen.  Maximum acetaminophen dose from all sources = 75 mg/kg/day not to exceed 4 grams/day.    Admin. Amount: 1 suppository (1 × 650 mg suppository)  Dispense Loc: [facility] [id number]               acetaminophen (TYLENOL) tablet 650 mg  Dose: 650 mg  Freq: EVERY 4 HOURS PRN Route: PO  PRN Reason: mild pain  Start: 10/27/18 1525   Admin Instructions: Alternate ibuprofen (if ordered) with acetaminophen  Maximum acetaminophen dose from all sources = 75 mg/kg/day not to exceed 4 grams/day.    Admin. Amount: 2 tablet (2 × 325 mg tablet)  Last Admin: 10/30/18 2101  Dispense Loc: HI [id number]        1258 (650 mg)-Given        1532 (650 mg)-Given        0311 (650 mg)-Given       2101 (650 mg)-Given            atorvastatin (LIPITOR) tablet 40 mg  Dose: 40 mg  Freq: DAILY WITH SUPPER Route: PO  Start: 10/27/18 1700   Admin. Amount: 1 tablet (1 × 40 mg tablet)  Last Admin: 10/30/18 1753  Dispense Loc: [facility] [id number]       1621 (40 mg)-Given        1815 (40 mg)-Given        1811 (40 mg)-Given        1753 (40 mg)-Given        [ ] 1700           azithromycin (ZITHROMAX) 250 mg in sodium chloride 0.9 % 250 mL intermittent infusion  Dose: 250 mg  Freq: EVERY 24 HOURS Route: IV  Indications of Use: COMMUNITY ACQUIRED PNEUMONIA  Last Dose: 250 mg (10/30/18 1203)  Start: 10/28/18 1130   End: 11/01/18 1129   Admin Instructions: Schedule subsequent doses 24 hours from first dose.   May switch to oral when taking PO and not in ICU.    Admin. Amount: 250 mg  Last Admin: 10/30/18  1203  Dispense Loc: [facility]  Infused Over: 1 Hours  Administrations Remainin  Volume: 250 mL   Mixture Administration Information:   Medication Type Amount   azithromycin 500 MG SOLR Medications 250 mg   sodium chloride 0.9 % SOLN Base 250 mL           Current Line: Peripheral IV 10/29/18 Right Lower forearm       1128 (250 mg)-New Bag        1141 (250 mg)-New Bag        1203 (250 mg)-New Bag        [ ] 1130           bisacodyl (DULCOLAX) Suppository 10 mg  Dose: 10 mg  Freq: EVERY 72 HOURS PRN Route: RE  PRN Reason: constipation  Start: 10/27/18 1645   Admin. Amount: 1 suppository (1 × 10 mg suppository)  Dispense Loc: [facility] [id number]               cefTRIAXone IN D5W (ROCEPHIN) intermittent infusion 2 g  Dose: 2 g  Freq: EVERY 24 HOURS Route: IV  Indications of Use: COMMUNITY ACQUIRED PNEUMONIA  Last Dose: 2 g (10/30/18 1118)  Start: 10/28/18 1100   Admin Instructions: FIRST DOSE STAT    Admin. Amount: 2 g  Last Admin: 10/30/18 1118  Dispense Loc: [facility]  Infused Over: 30 Minutes  Volume: 50 mL   Current Line: Peripheral IV 10/29/18 Right Lower forearm       1032 (2 g)-New Bag        1045 (2 g)-New Bag        1118 (2 g)-New Bag        [ ] 1100           cholecalciferol (vitamin D3) tablet 2,000 Units  Dose: 2,000 Units  Freq: DAILY Route: PO  Start: 10/27/18 1615   Admin. Amount: 2 tablet (2 × 1,000 Units tablet)  Last Admin: 10/31/18 0923  Dispense Loc: HI [id number]       1626 (2,000 Units)-Given        0927 (2,000 Units)-Given        0843 (2,000 Units)-Given        0859 (2,000 Units)-Given        0923 (2,000 Units)-Given           diltiazem (CARDIZEM) tablet 90 mg  Dose: 90 mg  Freq: EVERY 6 HOURS SCHEDULED Route: PO  Start: 10/31/18 1200   Admin. Amount: 3 tablet (3 × 30 mg tablet)  Dispense Loc: HI [id number]           [ ] 1200       [ ] 1800           guaiFENesin-dextromethorphan (ROBITUSSIN DM) 100-10 MG/5ML syrup 5 mL  Dose: 5 mL  Freq: EVERY 4 HOURS PRN Route: PO  PRN Reason: cough  Start:  10/27/18 1521   Admin. Amount: 5 mL  Dispense Loc: HI [id number]  Volume: 5 mL               HYDROmorphone (DILAUDID) tablet 2 mg  Dose: 2 mg  Freq: EVERY 4 HOURS PRN Route: PO  PRN Reason: moderate to severe pain  Start: 10/27/18 1521   Admin. Amount: 1 tablet (1 × 2 mg tablet)  Last Admin: 10/30/18 0348  Dispense Loc: HI [id number]        (2 mg)-Given        0232 (2 mg)-Given        0234 (2 mg)-Given        0348 (2 mg)-Given            influenza Vac Split High-Dose (FLUZONE) injection 0.5 mL  Dose: 0.5 mL  Freq: PRIOR TO DISCHARGE Route: IM  Start: 10/28/18 1000   Admin Instructions: Administer when afebrile (less than 100.4F) x 24 hours, or Prior to Discharge.  If not administering when scheduled , change the due time by following the instructions in the reference link below.  If patient refuses vaccine, chart as Vaccine Refused.    Admin. Amount: 0.5 mL  Dispense Loc: HI [id number]  Administrations Remainin  Volume: 0.5 mL                         (1037)-Vaccine Refused [C]           ipratropium - albuterol 0.5 mg/2.5 mg/3 mL (DUONEB) neb solution 3 mL  Dose: 3 mL  Freq: EVERY 4 HOURS PRN Route: NEBULIZATION  PRN Reason: wheezing  Start: 10/28/18 0404   Admin. Amount: 3 mL  Dispense Loc: HI [id number]  Volume: 3 mL        (0330)-Not Given       (1938)-Not Given         (0033)-Not Given       (0430)-Not Given       (0850)-Not Given       (0)-Not Given        (0)-Not Given       (0804)-Not Given           levETIRAcetam (KEPPRA) tablet 750 mg  Dose: 750 mg  Freq: 2 TIMES DAILY Route: PO  Start: 10/27/18 2100   Admin. Amount: 1 tablet (1 × 250 mg tablet) + 1 tablet (1 × 500 mg tablet)  Last Admin: 10/31/18 0922  Dispense Loc: HI [id number]   Mixture Administration Information:   Medication Type Amount   levETIRAcetam 250 MG TABS Medications 250 mg   levETIRAcetam 500 MG TABS Medications 500 mg                (750 mg)-Given        924 (750 mg)-Given        (750 mg)-Given        0843 (750 mg)-Given      "  1940 (750 mg)-Given               0858 (750 mg)-Given       2102 (750 mg)-Given        0922 (750 mg)-Given       [ ] 2100           lidocaine (LMX4) kit  Freq: EVERY 1 HOUR PRN Route: Top  PRN Reason: pain  PRN Comment: with VAD insertion or accessing implanted port.  Start: 10/27/18 1525   Admin Instructions: Do NOT give if patient has a history of allergy to any local anesthetic or any \"lyn\" product.  Apply 30 minutes prior to VAD insertion or port access. MAX Dose: 2.5 g (  of 5 g tube).    Dispense Loc: [facility] [id number]               lidocaine 1 % 1 mL  Dose: 1 mL  Freq: EVERY 1 HOUR PRN Route: OTHER  PRN Comment: mild pain with VAD insertion or accessing implanted port  Start: 10/27/18 1525   Admin Instructions: Do NOT give if patient has a history of allergy to any local anesthetic or any \"lyn\" product. MAX dose 1 mL subcutaneous OR intradermal in divided doses.    Admin. Amount: 1 mL  Dispense Loc: [facility] [id number]  Volume: 2 mL               magnesium hydroxide (MILK OF MAGNESIA) suspension 30 mL  Dose: 30 mL  Freq: EVERY 72 HOURS PRN Route: PO  PRN Reason: constipation  Start: 10/27/18 1646   Admin Instructions: Shake well.    Admin. Amount: 30 mL  Dispense Loc: [facility] [id number]  Volume: 30 mL               metoprolol (LOPRESSOR) injection 5 mg  Dose: 5 mg  Freq: EVERY 5 MIN PRN Route: IV  PRN Comment: Heart Rate > 110  Start: 10/30/18 0748   Admin Instructions: Give IV Push undiluted for IV doses 0.1-15 mg. For new therapy administer each 5mg administered over 1-2 minutes. When replacing chronic therapy, administer dose over 5-10 minutes.    Admin. Amount: 5 mg = 5 mL Conc: 1 mg/mL  Last Admin: 10/30/18 1829  Dispense Loc: [facility] [id number]  Infused Over: 5-10 Minutes  Volume: 5 mL   Current Line: Peripheral IV 10/29/18 Right Lower forearm         0801 (5 mg)-Given       1000 (5 mg)-Given       1829 (5 mg)-Given            metoprolol tartrate (LOPRESSOR) tablet 25 mg  Dose: 25 mg  Freq: 2 TIMES DAILY Route: PO  Start: " 10/30/18 0900   Admin. Amount: 1 tablet (1 × 25 mg tablet)  Last Admin: 10/31/18 0922  Dispense Loc: HI [id number]          0859 (25 mg)-Given       2102 (25 mg)-Given        0922 (25 mg)-Given       [ ] 2100           naloxone (NARCAN) injection 0.1-0.4 mg  Dose: 0.1-0.4 mg  Freq: EVERY 2 MIN PRN Route: IV  PRN Reason: opioid reversal  Start: 10/27/18 1628   Admin Instructions: For respiratory rate LESS than or EQUAL to 8.  Partial reversal dose:  0.1 mg titrated q 2 minutes for Analgesia Side Effects Monitoring Sedation Level of 3 (frequently drowsy, arousable, drifts to sleep during conversation).Full reversal dose:  0.4 mg bolus for Analgesia Side Effects Monitoring Sedation Level of 4 (somnolent, minimal or no response to stimulation).  For ordered IV doses 0.1-2mg give IVP. Give each 0.4mg over 15 seconds in emergency situations. For non-emergent situations further dilute in 9mL of NS to facilitate titration of response.    Admin. Amount: 0.1-0.4 mg = 0.25-1 mL Conc: 0.4 mg/mL  Dispense Loc: HI [id number]  Volume: 1 mL               ondansetron (ZOFRAN-ODT) ODT tab 4 mg  Dose: 4 mg  Freq: EVERY 6 HOURS PRN Route: PO  PRN Reasons: nausea,vomiting  Start: 10/27/18 1525   Admin Instructions: This is Step 1 of nausea and vomiting management.  If nausea not resolved in 15 minutes, go to Step 2 prochlorperazine (COMPAZINE). Do not push through foil backing. Peel back foil and gently remove. Place on tongue immediately. Administration with liquid unnecessary  With dry hands, peel back foil backing and gently remove tablet; do not push oral disintegrating tablet through foil backing; administer immediately on tongue and oral disintegrating tablet dissolves in seconds; then swallow with saliva; liquid not required.    Admin. Amount: 1 tablet (1 × 4 mg tablet)  Dispense Loc: HI [id number]              Or  ondansetron (ZOFRAN) injection 4 mg  Dose: 4 mg  Freq: EVERY 6 HOURS PRN Route: IV  PRN Reasons: nausea,vomiting  Start:  10/27/18 1525   Admin Instructions: This is Step 1 of nausea and vomiting management.  If nausea not resolved in 15 minutes, go to Step 2 prochlorperazine (COMPAZINE).  Irritant. For ordered IV doses 0.1-4 mg, give IV Push undiluted over 2-5 minutes.    Admin. Amount: 4 mg = 2 mL Conc: 4 mg/2 mL  Dispense Loc: HI [id number]  Infused Over: 2-5 Minutes  Volume: 2 mL               potassium chloride (KLOR-CON) Packet 40 mEq  Dose: 40 mEq  Freq: 2 TIMES DAILY Route: PO  Start: 10/31/18 0900   Admin Instructions: Dissolve packet contents in 4-8 ounces of cold water or juice.    Admin. Amount: 40 mEq  Last Admin: 10/31/18 0923  Dispense Loc: HI [id number]           0923 (40 mEq)-Given       [ ] 2100           predniSONE (DELTASONE) tablet 5 mg  Dose: 5 mg  Freq: DAILY WITH BREAKFAST Route: PO  Start: 10/27/18 1630   Admin. Amount: 1 tablet (1 × 5 mg tablet)  Last Admin: 10/31/18 0922  Dispense Loc: HI [id number]       1621 (5 mg)-Given        0921 (5 mg)-Given        0843 (5 mg)-Given        0859 (5 mg)-Given        0922 (5 mg)-Given           psyllium (METAMUCIL/KONSYL) Packet 1 packet  Dose: 1 packet  Freq: DAILY Route: PO  Start: 10/27/18 1615   Admin. Amount: 1 packet  Last Admin: 10/31/18 0923  Dispense Loc: HI [id number]       1624 (1 packet)-Given        0921 (1 packet)-Given        0845 (1 packet)-Given        0858 (1 packet)-Given        0923 (1 packet)-Given           risperiDONE (risperDAL) tablet 0.25 mg  Dose: 0.25 mg  Freq: 2 TIMES DAILY Route: PO  Start: 10/27/18 2100   Admin. Amount: 1 tablet (1 × 0.25 mg tablet)  Last Admin: 10/31/18 0921  Dispense Loc: HI [id number]       2008 (0.25 mg)-Given        0928 (0.25 mg)-Given       2037 (0.25 mg)-Given        0843 (0.25 mg)-Given       1940 (0.25 mg)-Given               0859 (0.25 mg)-Given       2102 (0.25 mg)-Given        0921 (0.25 mg)-Given       [ ] 2100           senna-docusate (SENOKOT-S;PERICOLACE) 8.6-50 MG per tablet 1 tablet  Dose: 1 tablet  Freq: 2 TIMES  DAILY Route: PO  Start: 10/27/18 2100   Admin. Amount: 1 tablet  Last Admin: 10/31/18 0923  Dispense Loc: HI [id number]       (2007)-Not Given        0928 (1 tablet)-Given       2037 (1 tablet)-Given        0843 (1 tablet)-Given       1940 (1 tablet)-Given               0859 (1 tablet)-Given       2102 (1 tablet)-Given        0923 (1 tablet)-Given       [ ] 2100           sertraline (ZOLOFT) tablet 75 mg  Dose: 75 mg  Freq: DAILY Route: PO  Start: 10/27/18 1615   Admin. Amount: 1 tablet (1 × 25 mg tablet) + 1 tablet (1 × 50 mg tablet)  Last Admin: 10/31/18 0921  Dispense Loc: HI [id number]   Mixture Administration Information:   Medication Type Amount   sertraline 25 MG TABS Medications 25 mg   sertraline 50 MG TABS Medications 50 mg               1625 (75 mg)-Given        0923 (75 mg)-Given        0843 (75 mg)-Given        0859 (75 mg)-Given        0921 (75 mg)-Given           sodium chloride (PF) 0.9% PF flush 3 mL  Dose: 3 mL  Freq: EVERY 8 HOURS Route: IK  Start: 10/27/18 1615   Admin Instructions: And Q1H PRN, to lock peripheral IV dormant line.    Admin. Amount: 3 mL  Last Admin: 10/29/18 0057  Dispense Loc: [facility]  Volume: 3 mL   Current Line: Peripheral IV 10/29/18 Right Lower forearm      (1622)-Not Given        (0057)-Not Given       (0923)-Not Given       (1649)-Not Given        0057 (3 mL)-Given       (0845)-Not Given       (1546)-Not Given       (2354)-Not Given        (0859)-Not Given       (1756)-Not Given       (2356)-Not Given        [ ] 0815       [ ] 1615           sodium chloride (PF) 0.9% PF flush 3 mL  Dose: 3 mL  Freq: EVERY 1 HOUR PRN Route: IK  PRN Reason: line flush  Start: 10/27/18 1525   Admin Instructions: for peripheral IV flush post IV meds    Admin. Amount: 3 mL  Dispense Loc: Med Surg [facility]  Volume: 3 mL               sodium chloride 0.9% infusion  Rate: 50 mL/hr   Freq: CONTINUOUS Route: IV  Start: 10/27/18 1615   Last Admin: 10/29/18 2042  Dispense Loc: [facility]  [facility]  Volume: 1,000 mL   Current Line: Peripheral IV 10/29/18 Right Lower forearm      1622 ( )-New Bag        0056 ( )-New Bag       0404-Stopped       0632 ( )-Started       1258 ( )-Rate/Dose Change        ( )-New Bag         ( )-New Bag             Warfarin Therapy Reminder (Check START DATE - warfarin may be starting in the FUTURE)  Dose: 1 each  Freq: CONTINUOUS PRN Route: XX  Start: 10/27/18 1524   Admin Instructions: *Note to reorder warfarin daily*  Pharmacy Warfarin Dosing Service  Patient is on Warfarin Therapy - check for daily order    Admin. Amount: 1 each  Dispense Loc: [facility] Main Pharmacy              Future Medications  Medications 10/25/18 10/26/18 10/27/18 10/28/18 10/29/18 10/30/18 10/31/18       warfarin (COUMADIN) tablet 2.5 mg  Dose: 2.5 mg  Freq: ONCE AT 6PM Route: PO  Start: 10/31/18 1800   Admin. Amount: 1 tablet (1 × 2.5 mg tablet)  Dispense Loc: [facility]  Administrations Remainin           [ ] 1800          Completed Medications  Medications 10/25/18 10/26/18 10/27/18 10/28/18 10/29/18 10/30/18 10/31/18         Dose: 5 mg  Freq: EVERY 5 MIN PRN Route: IV  PRN Reason: other  PRN Comment: HR>110  Start: 10/29/18 1835   End: 10/30/18 0701   Admin Instructions: Give IV Push undiluted for IV doses 0.1-15 mg. For new therapy administer each 5mg administered over 1-2 minutes. When replacing chronic therapy, administer dose over 5-10 minutes.    Admin. Amount: 5 mg = 5 mL Conc: 1 mg/mL  Last Admin: 10/30/18 0656  Dispense Loc: HI [id number]  Infused Over: 5-10 Minutes  Administrations Remainin  Volume: 5 mL   Current Line: Peripheral IV 10/29/18 Right Lower forearm        2328 (5 mg)-Given        0305 (5 mg)-Given [C]       0656 (5 mg)-Given              Dose: 40 mEq  Freq: ONCE Route: PO  Start: 10/30/18 0815   End: 10/30/18 0858   Admin Instructions: Dissolve packet contents in 4-8 ounces of cold water or juice.    Admin. Amount: 40 mEq  Last Admin: 10/30/18  0858  Dispense Loc: HI [id number]  Administrations Remainin          0858 (40 mEq)-Given              Dose: 1.5 mg  Freq: ONCE AT 6PM Route: PO  Start: 10/30/18 1800   End: 10/30/18 1914   Admin. Amount: 1 half-tab (1 × 1.5 mg half-tab)  Last Admin: 10/30/18 1914  Dispense Loc: [facility]  Administrations Remainin          1914 (1.5 mg)-Given              Dose: 1 mg  Freq: ONCE AT 6PM Route: PO  Start: 10/29/18 1800   End: 10/29/18 1811   Admin. Amount: 1 tablet (1 × 1 mg tablet)  Last Admin: 10/29/18 1811  Dispense Loc: HI [id number]  Administrations Remainin         1811 (1 mg)-Given               Dose: 1 mg  Freq: ONCE AT 6PM Route: PO  Start: 10/28/18 1800   End: 10/28/18 1815   Admin. Amount: 1 tablet (1 × 1 mg tablet)  Last Admin: 10/28/18 1815  Dispense Loc: HI [id number]  Administrations Remainin        1815 (1 mg)-Given             Discontinued Medications  Medications 10/25/18 10/26/18 10/27/18 10/28/18 10/29/18 10/30/18 10/31/18         Dose: 120 mg  Freq: DAILY Route: PO  Start: 10/29/18 0900   End: 10/30/18 1054   Admin Instructions: DO NOT CRUSH.    Admin. Amount: 1 capsule (1 × 120 mg capsule)  Last Admin: 10/30/18 0859  Dispense Loc: HI [id number]         0931-Hold        0859 (120 mg)-Given       1054-Med Discontinued          Dose: 30 mg  Freq: EVERY 6 HOURS SCHEDULED Route: PO  Start: 10/29/18 1345   End: 10/30/18 1054   Admin. Amount: 1 tablet (1 × 30 mg tablet)  Last Admin: 10/30/18 0530  Dispense Loc: HI [id number]         1351 (30 mg)-Given       1811 (30 mg)-Given       2350 (30 mg)-Given        0530 (30 mg)-Given       1054-Med Discontinued          Dose: 30 mg  Freq: EVERY 6 HOURS SCHEDULED Route: PO  Start: 10/28/18 0700   End: 10/29/18 1231   Admin. Amount: 1 tablet (1 × 30 mg tablet)  Last Admin: 10/29/18 0231  Dispense Loc: HI [id number]        0752 (30 mg)-Given       1320-Hold        (30 mg)-Given        0231 (30 mg)-Given       0931-Hold       1231-Med Discontinued            Dose: 60 mg  Freq: EVERY 6 HOURS SCHEDULED Route: PO  Start: 10/30/18 1200   End: 10/31/18 0652   Admin. Amount: 2 tablet (2 × 30 mg tablet)  Last Admin: 10/31/18 0555  Dispense Loc: HI [id number]          1118 (60 mg)-Given       1753 (60 mg)-Given       2351 (60 mg)-Given        0555 (60 mg)-Given       0652-Med Discontinued         Dose: 3 mL  Freq: EVERY 4 HOURS PRN Route: NEBULIZATION  PRN Reason: wheezing  Start: 10/28/18 2207   End: 10/28/18 2211   Admin. Amount: 3 mL  Volume: 3 mL        2211-Med Discontinued       Medications 10/25/18 10/26/18 10/27/18 10/28/18 10/29/18 10/30/18 10/31/18               INTERAGENCY TRANSFER FORM - NOTES (H&P, Discharge Summary, Consults, Procedures, Therapies)   10/27/2018                       [facility]: [phone]               History & Physicals      H&P by Sekou Griffith MD at 10/27/2018  4:11 PM     Author:  Sekou Griffith MD Service:  Hospitalist Author Type:  Physician    Filed:  10/28/2018 10:16 PM Date of Service:  10/27/2018  4:11 PM Creation Time:  10/27/2018  4:10 PM    Status:  Signed :  Sekou Griffith MD (Physician)         [facility]    History and Physical  Hospitalist       Date of Admission:  10/27/2018[SS1.1]    Assessment & Plan[SS1.2]   Danette Ramos is a[SS1.1]n[SS1.3] 78 year old female[SS1.1] with history of hypertension, 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 Was sent here from SNF for notable dyspnea. Evaluation in the ED revealed patient was in atrial fibrillation with rapid ventricular rate and CXR concerning for possible right lower lobe pneumonia.    1. Atrial fibrillation with rapid ventricular rate:  - Received diltiazem IV bolus in ED and now back in RVR. Will give IV metoprolol 5 mg and start on oral metoprolol 50 mg PO BID.   - Telemetry monitoring  - Continue on warfarin, INR 2.37  -  Obtain echocardiogram on Monday    2. Acute hypoxic respiratory failure secondary to possible right lower lobe pneumonia:  There is questionable infiltrate in the RLL upon my read of her CXR, although formal read suggests left lung opacity.   -Continue with Ceftriaxone and Azithromycin empirically   -IVF and recheck AM CXR  -O2 support via nasal canula and wean as able    3. CVA with right hemiparesis  She does have some speech and language deficit. Review of the medical record also indicates hx of dementia. Upon discussion with patient's daughter, she indicates that patient had no cognitive deficits prior to her stroke 3 years ago and that primarily her issue is more of a language barrier, making discernment of possible underlying dementia difficult.     4. Hypertension:  Reasonably controlled. Is not on oral antihypertensive PTA.   -Continue to monitor BP with addition of beta blocker as per above    5. Hx seizure  Continue with her Keppra    6. Anxiety:  Continue her Risperdone and Zoloft[SS1.5]    DVT Prophylaxis:[SS1.1] Warfarin[SS1.5]  Code Status:[SS1.1] DNR[SS1.5]    Disposition: Expected discharge in[SS1.1] 1-2[SS1.5] days once[SS1.1] rate controlled afib and respiratory status improving[SS1.5]    Sekou Griffith    Primary Care Physician   [facility]    Chief Complaint[SS1.2]   Dyspnea     Unable to obtain a history from the patient due to previous CVA with compromised ability to communicate[SS1.4]    History of Present Illness[SS1.2]   Danette Ramos is a[SS1.1]n[SS1.3] 78 year old female[SS1.1] with history of hypertension, 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 Was sent here from SNF for notable dyspnea. Patient unable to provide much history due to speech and language deficit. Was noted to have a moderate amount of wheezing and found to be in atrial fibrillation with rapid ventricular rate  (NR to 170s) on telemetry. She was given a bolus of 25 mg IV diltiazem in the ED, which brought her heart rate to the 80s. CXR revealed some question of RLL infiltrate and some blunting of the right costovertebral angle. She was hypoxic at 80% room air on arrival, sats increasing to 97% on 3L nasal canula. She is afebrile with no leukocytosis.  Lactic acid was slightly elevated at 3.6 and quickly decreased to 2.2 within the subsequent 4-5 hours. No fluid bolus given in the ED. Blood cultures were drawn and ceftriaxone and azithromycin were started empirically.[SS1.4] She had bilateral lower extremity doppler ultrasound with no evidence for DVT.[SS1.5] Upon arrival here to the floor, her HR has increased up into the 160s once again. She is lying in bed comfortably. I am really unable to elicit very much meaningful information from her due to deficits from her previous CVA. Her  is a bedside, he suffers from dementia and also is unable to add much to the history.[SS1.3]     Past Medical History[SS1.2]    Past Medical History:   Diagnosis Date     Anxiety      Aphasia      Atrial fibrillation with RVR (H)      Bilateral leg paresthesia      Bradycardia      Chronic anticoagulation      Complex partial seizure (H)      CVA (cerebral vascular accident) (H)     with right hemiparesis due to embolic stroke involving left posterior cerebral artery     Dementia      Hypertension      Orthostatic tremor      Osteopenia      Speech and language deficit as late effect of cerebrovascular accident (CVA)[SS1.6]        Past Surgical History[SS1.2]   I have reviewed this patient's surgical history and updated it with pertinent information if needed.[SS1.3]  Past Surgical History:   Procedure Laterality Date     CHOLECYSTECTOMY       COLONOSCOPY  12/2009     hysterectomy[SS1.6]         Prior to Admission Medications   Prior to Admission Medications   Prescriptions Last Dose Informant Patient Reported? Taking?   ACETAMINOPHEN  PO 10/23/2018 at 0900  Yes Yes   Sig: Take 650 mg by mouth every 6 hours as needed for pain   Acetaminophen (TYLENOL PO) 10/26/2018 at 1800  Yes Yes   Sig: Take 650 mg by mouth 2 times daily    Atorvastatin Calcium (LIPITOR PO) 10/26/2018 at 1800  Yes Yes   Sig: Take 40 mg by mouth daily   HYDROmorphone HCl (DILAUDID PO) Unknown at Unknown time  Yes No   Sig: Take 2 mg by mouth every 4 hours as needed for moderate to severe pain 2 to 4 mg oral every 4 hours prn pain   LEVETIRACETAM PO 10/26/2018 at 2000  Yes Yes   Sig: Take 750 mg by mouth 2 times daily   PREDNISONE PO 10/26/2018 at 0800  Yes Yes   Sig: Take 5 mg by mouth daily   RisperiDONE (RISPERDAL PO) 10/26/2018 at 1800  Yes Yes   Sig: Take 0.25 mg by mouth 2 times daily   SERTRALINE HCL PO 10/26/2018 at 0800  Yes Yes   Sig: Take 75 mg by mouth daily    Vitamin D, Cholecalciferol, 1000 units CAPS 10/26/2018 at 0800  No Yes   Sig: Take 2,000 Units by mouth daily   Warfarin Sodium (COUMADIN PO) 10/25/2018 at 1800  Yes Yes   Sig: Take 2.5 mg by mouth Tu, Th   Warfarin Sodium (COUMADIN PO) 10/26/2018 at 1800  Yes Yes   Sig: Take 5 mg by mouth M, W, F, Sa, Puga   bisacodyl (DULCOLAX) 10 MG Suppository Unknown at Unknown time  Yes No   Sig: Place 10 mg rectally every 3 days PRN   guaiFENesin-dextromethorphan (ROBITUSSIN DM) 100-10 MG/5ML syrup Unknown at Unknown time  Yes No   Sig: Take 5 mLs by mouth every 4 hours as needed for cough Take 5 to 10 mls q 4 hours as needed   magnesium hydroxide (MILK OF MAGNESIA) 400 MG/5ML suspension Unknown at Unknown time  Yes No   Sig: Take 30 mLs by mouth every 3 days PRN   psyllium (KONSYL) 100 % POWD 10/26/2018 at 0800  Yes Yes   Sig: Take 1 Tablespoonful by mouth daily One tablespoon mixed with juice   senna-docusate (SENOKOT-S;PERICOLACE) 8.6-50 MG per tablet 10/26/2018 at 1800  Yes Yes   Sig: Take 1 tablet by mouth 2 times daily       Facility-Administered Medications: None     Allergies   Allergies   Allergen Reactions      Bacitracin        Social History   Social History     Social History     Marital status:      Spouse name: N/A     Number of children: N/A     Years of education: N/A     Occupational History     Not on file.     Social History Main Topics     Smoking status: Not on file     Smokeless tobacco: Not on file     Alcohol use Not on file     Drug use: Not on file     Sexual activity: Not on file     Other Topics Concern     Not on file     Social History Narrative       Family History[SS1.2]   I have reviewed this patient's family history and updated it with pertinent information if needed.[SS1.3]   No family history on file.[SS1.6]    Review of Systems[SS1.2]   Review of systems not obtained due to patient factors - speech and language barrier due to previous CVA[SS1.3]    Physical Exam   Temp: 97.8  F (36.6  C) Temp src: Tympanic BP: 117/73 Pulse: 118 Heart Rate: 94 Resp: 20 SpO2: 96 % O2 Device: Nasal cannula Oxygen Delivery: 3 LPM[SS1.2]  Vital Signs with Ranges[SS1.1]  Temp:  [97.2  F (36.2  C)-97.8  F (36.6  C)] 97.8  F (36.6  C)  Pulse:  [118-174] 118  Heart Rate:  [] 94  Resp:  [18-28] 20  BP: ()/() 117/73  SpO2:  [80 %-99 %] 96 %  173 lbs .98 oz[SS1.2]    Constitutional:   awake, alert, cooperative, no apparent distress, and appears stated age   , Eyes:   Lids and lashes normal, pupils equal, round and reactive to light, extra ocular muscles intact, sclera clear, conjunctiva normal   , ENT:   Normocephalic, without obvious abnormality, atraumatic, sinuses nontender on palpation, external ears without lesions, oral pharynx with dry mucous membranes, tonsils without erythema or exudates, gums normal and good dentition.   , Neck:   Supple, symmetrical, trachea midline, no adenopathy, thyroid symmetric, not enlarged and no tenderness, skin normal   , Hematologic / Lymphatic:   no cervical lymphadenopathy and no supraclavicular lymphadenopathy   , Lungs:   No increased work of breathing, good  air exchange, clear to auscultation bilaterally, no crackles or wheezing   , Cardiovascular:   Normal apical impulse, irregular and tachycardic, normal S1 and S2, no S3 or S4, and no murmur noted   , Abdomen:   No scars, normal bowel sounds, soft, non-distended, non-tender, no masses palpated, no hepatosplenomegally   , Musculoskeletal:   There is no redness, warmth, or swelling of the joints.  Full range of motion noted.  Right hemiparesis.  Tone is normal.   , Neuropsychiatric:   General: normal, calm and normal eye contact  Affect: normal  Orientation: difficult to assess due to language barrier s/p CVA    and Skin:[SS1.3]   no redness, warmth, or swelling, no rashes and no lesions[SS1.7]       Data[SS1.2]   Data reviewed today:  I personally reviewed[SS1.1] the chest x-ray image(s) showing findings as per above. EKG showed atrial fibrillation with rapid ventricular rate. No apparent ischemic changes.[SS1.7]    Recent Labs  Lab 10/27/18  0744   WBC 6.0   HGB 13.9   MCV 88      INR 2.37*      POTASSIUM 4.0   CHLORIDE 108   CO2 30   BUN 11   CR 0.70   ANIONGAP 5   SILVIO 8.0*   GLC 88   ALBUMIN 3.2*   PROTTOTAL 7.5   BILITOTAL 0.4   ALKPHOS 91   ALT 24   AST 17   TROPI <0.015[SS1.2]     Lactic Acid   Date Value Ref Range Status   10/27/2018 2.2 (H) 0.4 - 2.0 mmol/L Final     Comment:     Significant value called to and read back by  MASSIEL BROWN AT 1249 ON 10/27/18 BY B[facility]     10/27/2018 3.6 (H) 0.4 - 2.0 mmol/L Final     Comment:     Significant value called to and read back by  BHUIPNDER STAFFORD AT 0824 ON 10/27/18 BY B[facility]     02/22/2018 1.6 0.4 - 2.0 mmol/L Final[SS1.1]       Recent Results (from the past 24 hour(s))   XR Chest Port 1 View    Narrative    PROCEDURE:  XR CHEST PORT 1 VW    HISTORY:  sob; .     COMPARISON:  November 24, 2017    FINDINGS:   Heart is  borderline in size. The pulmonary vasculature is normal.   There is abnormal increased density in the left lower lung consistent  with  atelectasis or pneumonia. There is mild blunting of the  costophrenic angle on the left.      Impression    IMPRESSION:  Left lung opacity consistent with atelectasis or  pneumonia      ENDER SPARKS MD   US Lower Extremity Venous Duplex Bilateral    Narrative    Exam:US LOWER EXTREMITY VENOUS DUPLEX BILATERAL    History:Leg swelling    Comparisons:none    Technique: Venous duplex ultrasonography of the bilateral lower  extremities was performed.     Findings: The common femoral veins, superficial femoral veins and  popliteal veins are fully compressible with spontaneous and  augmentable venous flow.           Impression    Impression: No evidence of deep venous thrombosis within the lower  extremities.    ENDER SPARKS MD[SS1.2]              Revision History        User Key Date/Time User Provider Type Action    > SS1.5 10/28/2018 10:16 PM Sekou Griffith MD Physician Sign     SS1.7 10/28/2018  8:33 PM Sekou Griffith MD Physician      SS1.6 10/28/2018  2:34 PM Sekou Griffith MD Physician      SS1.3 10/28/2018  2:27 PM Sekou Griffith MD Physician      SS1.4 10/28/2018  7:39 AM Sekou Griffith MD Physician      SS1.2 10/27/2018  4:11 PM Sekou Griffith MD Physician      SS1.1 10/27/2018  4:10 PM Sekou Griffith MD Physician                      Discharge Summaries      Discharge Summaries by Gamal Reyes MD at 10/31/2018 10:03 AM     Author:  Gamal Reyes MD Service:  Hospitalist Author Type:  Physician    Filed:  10/31/2018 10:16 AM Date of Service:  10/31/2018 10:03 AM Creation Time:  10/31/2018 10:03 AM    Status:  Addendum :  Gamal Reyes MD (Physician)         [facility]    Discharge Summary  Hospitalist    Date of Admission:  10/27/2018  Date of Discharge:[SH1.1]  10/31/2018[SH1.2]  Discharging Provider: Gamal Reyes MD  Date of Service (when I saw the patient): 10/31/18    Discharge Diagnoses   Active Problems:    Community Acquired Pneumonia - bilateral    Acute  hypoxic respiratory failure secondary to pneumonia    Acute cystitis without hematuria, >[SH1.1]1[SH1.3]00,000 colonies of Proteus mirabilis  Chronic atrial fibrillation with rapid ventricular response currently on warfarin    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  Please see admission H+P for additional details.    Hospital Course   Danette Ramos is an 78-year-old female with history of CVA and resultant right-sided hemiparesis and dysarthria, chronic atrial fibrillation on warfarin, hypertension, dementia was admitted on 10/27/2018 for shortness of breath.  The patient was found to have a right lower lobe pneumonia on chest x-ray as well as rapid ventricular response from her atrial fibrillation.  She was placed on empiric ceftriaxone and azithromycin and her supplemental oxygen was weaned without difficulty.  Blood cultures were negative.  She was also found to have a UTI, urine culture grew out >100,000 colonies of Proteus mirabilis sp.  She was empirically switched to oral levofloxacin and she will complete a 10-day course of antibiotic treatment.  Her respiratory status is currently stable.  Her pneumonia did seem to trigger some rapid ventricular response.  She is on diltiazem extended release 120 mg daily, however this proved to be inadequate at this time.  She was started on metoprolol 25 mg twice daily, and she was found to have increased need of diltiazem as well.  She was having some trouble swallowing the whole pills of the XR formulation, therefore her diltiazem was converted completely to the immediate release tablets.  Her dose was increased to 90 mg 4 times daily for a total of 360 mg daily.  She has prescribed the immediate release tablet for the  ability to crush up the medication.  She did have a repeat echocardiogram which showed an ejection fraction of 35-40%, which is essentially unchanged from previous echocardiograms.  She is currently stable for discharge back to her skilled nursing facility, she is establish DNR status.    Gamal Reyes MD    Significant Results and Procedures   See below.    Pending Results   These results will be followed up by Crow Taylor    Unresulted Labs Ordered in the Past 30 Days of this Admission     Date and Time Order Name Status Description    10/27/2018 1013 Blood culture Preliminary     10/27/2018 1011 Blood culture Preliminary           Code Status   DNR       Primary Care Physician   Crow Taylor    Physical Exam   Temp: 98.9  F (37.2  C) Temp src: Tympanic BP: 132/60 Pulse: 74 Heart Rate: 83 Resp: 15 SpO2: 93 % O2 Device: None (Room air)    Vitals:    10/27/18 1209   Weight: 78.5 kg (173 lb 1 oz)     Vital Signs with Ranges  Temp:  [98.9  F (37.2  C)-100  F (37.8  C)] 98.9  F (37.2  C)  Pulse:  [] 74  Heart Rate:  [] 83  Resp:  [15-24] 15  BP: (132-160)/(60-90) 132/60  SpO2:  [92 %-95 %] 93 %  I/O last 3 completed shifts:  In: 2732 [P.O.:600; I.V.:2132]  Out: -     Constitutional: demented, mostly unintelligible speech, mumbling  Eyes: PERRLA, no injection, no icterus  HEENT: atraumatic, normocephalic  Respiratory: CTA b/l  Cardiovascular: S1 S2 RRR  GI: soft, NT, ND, + bowel sounds  Lymph/Hematologic: no palpable lymphadenopathy  Skin: no rashes, no lesions  Musculoskeletal: No edema, good tone, no deformities  Neurologic: oriented x 0, residual R sided weakness  Psychiatric: appropriate affect      Discharge Disposition   Discharged to nursing home  Condition at discharge: Stable    Consultations This Hospital Stay   PHARMACY TO DOSE WARFARIN  PHYSICAL THERAPY ADULT IP CONSULT  OCCUPATIONAL THERAPY ADULT IP CONSULT    Time Spent on this Encounter   IGamal MD, personally saw the  patient today and spent greater than 30 minutes discharging this patient.    Discharge Orders     General info for SNF   Length of Stay Estimate: Long Term Care  Condition at Discharge: Stable  Level of care:skilled   Rehabilitation Potential: Poor  Admission H&P remains valid and up-to-date: Yes  Recent Chemotherapy: N/A  Use Nursing Home Standing Orders: Yes     Mantoux instructions   Give two-step Mantoux (PPD) Per Facility Policy Yes     Reason for your hospital stay   pneumonia     Follow Up and recommended labs and tests   Follow up with intermediate physician.  The following labs/tests are recommended: INR 2-3 days.     Activity - Up with nursing assistance     DNR (Do Not Resuscitate)     Physical Therapy Adult Consult   Evaluate and treat as clinically indicated.    Reason:  weakness     Occupational Therapy Adult Consult   Evaluate and treat as clinically indicated.    Reason:  weakness     Fall precautions     Seizure precautions     Advance Diet as Tolerated   Follow this diet upon discharge: Orders Placed This Encounter     Room Service     Regular Diet Adult       Discharge Medications   Current Discharge Medication List      START taking these medications    Details   diltiazem (CARDIZEM) 90 MG tablet Take 1 tablet (90 mg) by mouth every 6 hours  Qty: 120 tablet, Refills: 0    Associated Diagnoses: Chronic atrial fibrillation (H)      levofloxacin (LEVAQUIN) 500 MG tablet Take 1 tablet (500 mg) by mouth daily  Qty: 7 tablet, Refills: 0    Associated Diagnoses: Community acquired pneumonia, unspecified laterality      metoprolol tartrate (LOPRESSOR) 25 MG tablet Take 1 tablet (25 mg) by mouth 2 times daily  Qty: 60 tablet, Refills: 0    Associated Diagnoses: Chronic atrial fibrillation (H)      potassium chloride (KLOR-CON) 20 MEQ Packet Take 40 mEq by mouth daily  Qty: 60 tablet, Refills: 0    Associated Diagnoses: Chronic atrial fibrillation (H)         CONTINUE these medications which have CHANGED     Details   HYDROmorphone (DILAUDID) 2 MG tablet Take 1 tablet (2 mg) by mouth every 4 hours as needed for moderate to severe pain 2 to 4 mg oral every 4 hours prn pain  Qty: 10 tablet, Refills: 0    Associated Diagnoses: Hospice care patient         CONTINUE these medications which have NOT CHANGED    Details   !! Acetaminophen (TYLENOL PO) Take 650 mg by mouth 2 times daily       !! ACETAMINOPHEN PO Take 650 mg by mouth every 6 hours as needed for pain      Atorvastatin Calcium (LIPITOR PO) Take 40 mg by mouth daily      LEVETIRACETAM PO Take 750 mg by mouth 2 times daily      PREDNISONE PO Take 5 mg by mouth daily      psyllium (KONSYL) 100 % POWD Take 1 Tablespoonful by mouth daily One tablespoon mixed with juice      RisperiDONE (RISPERDAL PO) Take 0.25 mg by mouth 2 times daily      senna-docusate (SENOKOT-S;PERICOLACE) 8.6-50 MG per tablet Take 1 tablet by mouth 2 times daily       SERTRALINE HCL PO Take 75 mg by mouth daily       Vitamin D, Cholecalciferol, 1000 units CAPS Take 2,000 Units by mouth daily  Qty: 30 capsule, Refills: 0    Associated Diagnoses: Healthcare maintenance      !! Warfarin Sodium (COUMADIN PO) Take 2.5 mg by mouth Tu, Th      !! Warfarin Sodium (COUMADIN PO) Take 5 mg by mouth M, W, F, Sa, Puga      bisacodyl (DULCOLAX) 10 MG Suppository Place 10 mg rectally every 3 days PRN      guaiFENesin-dextromethorphan (ROBITUSSIN DM) 100-10 MG/5ML syrup Take 5 mLs by mouth every 4 hours as needed for cough Take 5 to 10 mls q 4 hours as needed      magnesium hydroxide (MILK OF MAGNESIA) 400 MG/5ML suspension Take 30 mLs by mouth every 3 days PRN       !! - Potential duplicate medications found. Please discuss with provider.        Allergies   Allergies   Allergen Reactions     Bacitracin      Data   Most Recent 3 CBC's:  Recent Labs   Lab Test  10/31/18   0522  10/30/18   0529  10/29/18   0512   WBC  4.3  6.8  6.0   HGB  11.5*  11.9  11.4*   MCV  85  86  88   PLT  131*  144*  123*      Most  Recent 3 BMP's:  Recent Labs   Lab Test  10/31/18   0522  10/30/18   0529  10/29/18   0512   NA  142  139  143   POTASSIUM  2.9*  3.1*  3.5   CHLORIDE  110*  108  112*   CO2  27  25  25   BUN  5*  4*  9   CR  0.48*  0.59  0.55   ANIONGAP  5  6  6   SILVIO  7.7*  7.6*  7.3*   GLC  96  111*  98     Most Recent 2 LFT's:  Recent Labs   Lab Test  10/29/18   0512  10/27/18   0744   AST  134*  17   ALT  167*  24   ALKPHOS  114  91   BILITOTAL  0.4  0.4     Most Recent INR's and Anticoagulation Dosing History:  Anticoagulation Dose History     Recent Dosing and Labs Latest Ref Rng & Units 11/24/2017 2/22/2018 10/27/2018 10/28/2018 10/29/2018 10/30/2018 10/31/2018    Warfarin 1 mg - - - - 1 mg 1 mg - -    Warfarin 1.5 mg - - - - - - 1.5 mg -    Warfarin 2.5 mg - - - 2.5 mg - - - -    INR 0.80 - 1.20 2.49(H) 2.92(H) 2.37(H) 2.73(H) 3.40(H) 2.45(H) 2.14(H)        Most Recent 3 Troponin's:  Recent Labs   Lab Test  10/27/18   0744  02/22/18   0853  11/24/17   0658   TROPI  <0.015  <0.015  0.156*     Most Recent Cholesterol Panel:No lab results found.  Most Recent 6 Bacteria Isolates From Any Culture (See EPIC Reports for Culture Details):  Recent Labs   Lab Test  10/27/18   1520  10/27/18   1034  10/27/18   0917  10/27/18   0745  02/22/18   0937  02/22/18   0928   CULT  No MRSA isolated  No growth after 3 days  >100,000 colonies/mL  Proteus mirabilis  *  No growth after 3 days  No growth after 6 days  No growth after 6 days     Most Recent TSH, T4 and A1c Labs:  Recent Labs   Lab Test  10/29/18   0512   TSH  0.93     Results for orders placed or performed during the hospital encounter of 10/27/18   XR Chest Port 1 View    Narrative    PROCEDURE:  XR CHEST PORT 1 VW    HISTORY:  sob; .     COMPARISON:  November 24, 2017    FINDINGS:   Heart is  borderline in size. The pulmonary vasculature is normal.   There is abnormal increased density in the left lower lung consistent  with atelectasis or pneumonia. There is mild blunting of  the  costophrenic angle on the left.      Impression    IMPRESSION:  Left lung opacity consistent with atelectasis or  pneumonia      ENDER SPARKS MD   US Lower Extremity Venous Duplex Bilateral    Narrative    Exam:US LOWER EXTREMITY VENOUS DUPLEX BILATERAL    History:Leg swelling    Comparisons:none    Technique: Venous duplex ultrasonography of the bilateral lower  extremities was performed.     Findings: The common femoral veins, superficial femoral veins and  popliteal veins are fully compressible with spontaneous and  augmentable venous flow.           Impression    Impression: No evidence of deep venous thrombosis within the lower  extremities.    ENDER SPARKS MD   XR Chest Port 1 View    Narrative    PROCEDURE:  XR CHEST PORT 1 VW    HISTORY:  dyspnea; .     COMPARISON:  October 27, 2018    FINDINGS:   Heart is borderline in size. The pulmonary vasculature is normal.   There is persistent left lung opacity which is stable from previous  examination. Some new right lung opacity is noted suspicious for  pneumonia. There is possible blunting of left costophrenic angle      Impression    IMPRESSION:  Worsening right lung opacity consistent with pneumonia      ENDER SPARKS MD[SH1.1]          Revision History        User Key Date/Time User Provider Type Action    > SH1.3 10/31/2018 10:16 AM Gamal Reyes MD Physician Addend     SH1.2 10/31/2018 10:15 AM Gamal Reyes MD Physician Sign     SH1.1 10/31/2018 10:03 AM Gamal Reyes MD Physician                   Consult Notes     No notes of this type exist for this encounter.         Progress Notes - Physician (Notes for yesterday and today)      Progress Notes by Gamal Reyes MD at 10/30/2018  8:41 AM     Author:  Gamal Reyes MD Service:  Hospitalist Author Type:  Physician    Filed:  10/30/2018  8:53 AM Date of Service:  10/30/2018  8:41 AM Creation Time:  10/30/2018  8:41 AM    Status:  Signed :  Gamal Reyes MD (Physician)          [facility]    Hospitalist Progress Note      Assessment & Plan   Danette Ramos is an 78 year old female with history of hypertension, 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 Was sent here from SNF for notable dyspnea. Evaluation in the ED revealed patient was in atrial fibrillation with rapid ventricular rate and CXR concerning for possible right lower lobe pneumonia.     1. Atrial fibrillation with rapid ventricular rate:  Heart rates during her stay have been variable, ranging 60-160s at times.  Currently uncontrolled with 130-160s.  A couple brief episodes where she dropped her rate into the 30-40s. Echocardiogram shows reduced LV function with EF 35-40%, moderate diffuse hypokinesis, normal RV size, moderate tricuspid insufficiency, RVSP 31 + RA.   - she is on dilt  mg daily  - dilt short acting 30 mg QID, will continue to uptitrate as indicated  - convert to long acting as indicated  - continue metoprolol 25 mg BID  - Continue with telemetry monitoring  - Continue on warfarin, INR 2/45 today. Appreciate pharmacy assistance    2. Acute hypoxic respiratory failure secondary to right lower lobe pneumonia:  Respiratory status stable, currently not on supplemental O2.  CXR 10/28 showed interval development of definite right lung opacity consistent with pneumonia.   -Continue with Ceftriaxone and Azithromycin empirically, treat as community acquired. No significant MDR/MRSA risk factors  -Continue O2 support via nasal canula and wean as able  -Continue modest IVF  -PRN duonebs     3. CVA with right hemiparesis  She does have some speech and language deficit. Review of the medical record also indicates hx of dementia. Upon discussion with patient's daughter, she indicates that patient had no cognitive deficits prior to her stroke 3 years ago and that primarily her issue is more of a language  barrier, making discernment of possible underlying dementia difficult.      4. Hypertension:  Reasonably controlled. Is not on oral antihypertensive PTA.   -Continue to monitor BP, now on oral diltiazem for rate control     5. Hx seizure  Continue with her Keppra     6. Anxiety:  Continue her Risperdone and Zoloft    7. Acute cystitis without hematuria: urine culture with > 100,000 colonies of Proteus mirabilis, resistant to nitrofurantoin  - patient is on ceftriaxone     DVT Prophylaxis: Warfarin  Code Status: DNR    Disposition: Expected discharge in 1-2 days once able to establish rate control  Gamal Reyes MD    Interval History   Patient lying in bed awake.  Dementia apparent, calling out of help.  Disoriented to place and time, seems to be in some distress but is able to deny pain and sob.  No supplemental O2.    -Data reviewed today: I reviewed all new labs and imaging results over the last 24 hours. I personally reviewed no images today  Physical Exam   Temp: 99.1  F (37.3  C) Temp src: Temporal BP: 152/83 Pulse: 115 Heart Rate: 83 Resp: 20 SpO2: 94 % O2 Device: None (Room air)    Vitals:    10/27/18 1209   Weight: 78.5 kg (173 lb 1 oz)     Vital Signs with Ranges  Temp:  [98.5  F (36.9  C)-100  F (37.8  C)] 99.1  F (37.3  C)  Pulse:  [] 115  Heart Rate:  [] 83  Resp:  [14-20] 20  BP: (133-182)/() 152/83  SpO2:  [91 %-96 %] 94 %  I/O last 3 completed shifts:  In: 808 [P.O.:420; I.V.:388]  Out: -     Peripheral IV 10/29/18 Right Lower forearm (Active)   Site Assessment WDL 10/29/2018 11:45 PM   Line Status Infusing 10/29/2018 11:45 PM   Phlebitis Scale 0-->no symptoms 10/29/2018 11:45 PM   Infiltration Scale 0 10/29/2018 11:45 PM   Number of days:1     Line/device assessment(s) completed for medical necessity    Constitutional: Alert, disoriented  HEENT: Normocephalic/atraumatic, PERRL, EOMI, mouth clear, neck supple, no LN  Cardiovascular: Irregular. No murmur, no  rubs, or gallops.     Respiratory: Decreased air exchange overall. No expiratory wheezing.   Abdomen: Soft, nontender, nondistended, no organomegaly. Bowel sounds present  Extremities: Warm/dry. 1+ edema  Neuro:  Non focal, cranial nerves intact, Moves all extremities.      Medications     sodium chloride 50 mL/hr at 10/29/18 2042     Warfarin Therapy Reminder         atorvastatin (LIPITOR) tablet 40 mg  40 mg Oral Daily with supper     azithromycin  250 mg Intravenous Q24H     cefTRIAXone  2 g Intravenous Q24H     cholecalciferol  2,000 Units Oral Daily     diltiazem  120 mg Oral Daily     diltiazem  30 mg Oral Q6H VALERIE     influenza Vac Split High-Dose  0.5 mL Intramuscular Prior to discharge     levETIRAcetam  750 mg Oral BID     metoprolol tartrate  25 mg Oral BID     potassium chloride  40 mEq Oral Once     predniSONE (DELTASONE) tablet 5 mg  5 mg Oral Daily with breakfast     psyllium  1 packet Oral Daily     risperiDONE (risperDAL) tablet 0.25 mg  0.25 mg Oral BID     senna-docusate  1 tablet Oral BID     sertraline (ZOLOFT) tablet 75 mg  75 mg Oral Daily     sodium chloride (PF)  3 mL Intracatheter Q8H     warfarin  1.5 mg Oral ONCE at 18:00       Data     Recent Labs  Lab 10/30/18  0529 10/29/18  0512 10/28/18  0601 10/27/18  0744   WBC 6.8 6.0 7.0 6.0   HGB 11.9 11.4* 12.6 13.9   MCV 86 88 88 88   * 123* 154 202   INR 2.45* 3.40* 2.73* 2.37*    143 143 143   POTASSIUM 3.1* 3.5 3.9 4.0   CHLORIDE 108 112* 112* 108   CO2 25 25 23 30   BUN 4* 9 13 11   CR 0.59 0.55 0.64 0.70   ANIONGAP 6 6 8 5   SILVIO 7.6* 7.3* 7.6* 8.0*   * 98 125* 88   ALBUMIN  --  2.7*  --  3.2*   PROTTOTAL  --  6.2*  --  7.5   BILITOTAL  --  0.4  --  0.4   ALKPHOS  --  114  --  91   ALT  --  167*  --  24   AST  --  134*  --  17   TROPI  --   --   --  <0.015       No results found for this or any previous visit (from the past 24 hour(s)).[SH1.1]       Revision History        User Key Date/Time User Provider Type Action    > SH1.1 10/30/2018   8:53 AM Gamal Reyes MD Physician Sign                  Procedure Notes     No notes of this type exist for this encounter.      Progress Notes - Therapies (Notes from 10/28/18 through 10/31/18)     No notes of this type exist for this encounter.                                          INTERAGENCY TRANSFER FORM - LAB / IMAGING / EKG / EMG RESULTS   10/27/2018                       [facility]: [phone]            Unresulted Labs (24h ago through future)    Start       Ordered    10/28/18 0600  INR  (warfarin (COUMADIN) Pharmacy Consult-INITIAL ORDER)  DAILY,   Routine      10/27/18 1608         Lab Results - 3 Days      Basic metabolic panel [[id number]] (Abnormal)  Resulted: 10/31/18 0601, Result status: Final result    Ordering provider: Sekou Griffith MD  10/30/18 2300 Resulting lab: [facility]    Specimen Information    Type Source Collected On   Blood  10/31/18 0522          Components       Value Reference Range Flag Lab   Sodium 142 133 - 144 mmol/L  HI   Potassium 2.9 3.4 - 5.3 mmol/L LL HI   Comment:         Critical Value called to and read back by  ALFREDO VALDIVIA AT 0601 10/31/18 JU     Chloride 110 94 - 109 mmol/L H HI   Carbon Dioxide 27 20 - 32 mmol/L  HI   Anion Gap 5 3 - 14 mmol/L  HI   Glucose 96 70 - 99 mg/dL  HI   Urea Nitrogen 5 7 - 30 mg/dL L HI   Creatinine 0.48 0.52 - 1.04 mg/dL L HI   GFR Estimate >90 >60 mL/min/1.7m2  HI   Comment:  Non  GFR Calc   GFR Estimate If Black >90 >60 mL/min/1.7m2  HI   Comment:  African American GFR Calc   Calcium 7.7 8.5 - 10.1 mg/dL L HI            INR [[id number]] (Abnormal)  Resulted: 10/31/18 0548, Result status: Final result    Ordering provider: Sekou Griffith MD  10/31/18 0000 Resulting lab: [facility]    Specimen Information    Type Source Collected On   Blood  10/31/18 0522          Components       Value Reference Range Flag Lab   INR 2.14 0.80 - 1.20 H HI            CBC with platelets  differential [[id number]] (Abnormal)  Resulted: 10/31/18 0537, Result status: Final result    Ordering provider: Sekou Griffith MD  10/30/18 2300 Resulting lab: [facility]    Specimen Information    Type Source Collected On   Blood  10/31/18 0522          Components       Value Reference Range Flag Lab   WBC 4.3 4.0 - 11.0 10e9/L  HI   RBC Count 3.98 3.8 - 5.2 10e12/L  HI   Hemoglobin 11.5 11.7 - 15.7 g/dL L HI   Hematocrit 33.9 35.0 - 47.0 % L HI   MCV 85 78 - 100 fl  HI   MCH 28.9 26.5 - 33.0 pg  HI   MCHC 33.9 31.5 - 36.5 g/dL  HI   RDW 14.8 10.0 - 15.0 %  HI   Platelet Count 131 150 - 450 10e9/L L HI   Diff Method Automated Method   HI   % Neutrophils 63.6 %  HI   % Lymphocytes 26.3 %  HI   % Monocytes 7.3 %  HI   % Eosinophils 2.1 %  HI   % Basophils 0.5 %  HI   % Immature Granulocytes 0.2 %  HI   Nucleated RBCs 0 0 /100  HI   Absolute Neutrophil 2.7 1.6 - 8.3 10e9/L  HI   Absolute Lymphocytes 1.1 0.8 - 5.3 10e9/L  HI   Absolute Monocytes 0.3 0.0 - 1.3 10e9/L  HI   Absolute Eosinophils 0.1 0.0 - 0.7 10e9/L  HI   Absolute Basophils 0.0 0.0 - 0.2 10e9/L  HI   Abs Immature Granulocytes 0.0 0 - 0.4 10e9/L  HI   Absolute Nucleated RBC 0.0   HI            Blood culture [[id number]]  Resulted: 10/30/18 1430, Result status: Preliminary result    Ordering provider: Brenda Vieyra MD  10/27/18 1011 Resulting lab: [facility]    Specimen Information    Type Source Collected On   Blood  10/27/18 0745          Components       Value Reference Range Flag Lab   Specimen Description Blood      Culture Micro No growth after 3 days   HI            Blood culture [[id number]]  Resulted: 10/30/18 1430, Result status: Preliminary result    Ordering provider: Brenda Vieyra MD  10/27/18 1013 Resulting lab: [facility]    Specimen Information    Type Source Collected On   Blood  10/27/18 1034          Components       Value Reference Range Flag Lab   Specimen Description Blood       Culture Micro No growth after 3 days   HI            Basic metabolic panel [[id number]] (Abnormal)  Resulted: 10/30/18 0559, Result status: Final result    Ordering provider: Sekou Griffith MD  10/29/18 2300 Resulting lab: [facility]    Specimen Information    Type Source Collected On   Blood  10/30/18 0529          Components       Value Reference Range Flag Lab   Sodium 139 133 - 144 mmol/L  HI   Potassium 3.1 3.4 - 5.3 mmol/L L HI   Chloride 108 94 - 109 mmol/L  HI   Carbon Dioxide 25 20 - 32 mmol/L  HI   Anion Gap 6 3 - 14 mmol/L  HI   Glucose 111 70 - 99 mg/dL H HI   Urea Nitrogen 4 7 - 30 mg/dL L HI   Creatinine 0.59 0.52 - 1.04 mg/dL  HI   GFR Estimate >90 >60 mL/min/1.7m2  HI   Comment:  Non  GFR Calc   GFR Estimate If Black >90 >60 mL/min/1.7m2  HI   Comment:  African American GFR Calc   Calcium 7.6 8.5 - 10.1 mg/dL L HI            INR [[id number]] (Abnormal)  Resulted: 10/30/18 0557, Result status: Final result    Ordering provider: Sekou Griffith MD  10/30/18 0000 Resulting lab: [facility]    Specimen Information    Type Source Collected On   Blood  10/30/18 0529          Components       Value Reference Range Flag Lab   INR 2.45 0.80 - 1.20 H HI            CBC with platelets differential [[id number]] (Abnormal)  Resulted: 10/30/18 0540, Result status: Final result    Ordering provider: Sekou Griffith MD  10/29/18 2300 Resulting lab: [facility]    Specimen Information    Type Source Collected On   Blood  10/30/18 0529          Components       Value Reference Range Flag Lab   WBC 6.8 4.0 - 11.0 10e9/L  HI   RBC Count 4.16 3.8 - 5.2 10e12/L  HI   Hemoglobin 11.9 11.7 - 15.7 g/dL  HI   Hematocrit 35.7 35.0 - 47.0 %  HI   MCV 86 78 - 100 fl  HI   MCH 28.6 26.5 - 33.0 pg  HI   MCHC 33.3 31.5 - 36.5 g/dL  HI   RDW 14.8 10.0 - 15.0 %  HI   Platelet Count 144 150 - 450 10e9/L L HI   Diff Method Automated Method   HI   % Neutrophils  62.6 %  HI   % Lymphocytes 26.3 %  HI   % Monocytes 7.8 %  HI   % Eosinophils 2.5 %  HI   % Basophils 0.4 %  HI   % Immature Granulocytes 0.4 %  HI   Nucleated RBCs 0 0 /100  HI   Absolute Neutrophil 4.2 1.6 - 8.3 10e9/L  HI   Absolute Lymphocytes 1.8 0.8 - 5.3 10e9/L  HI   Absolute Monocytes 0.5 0.0 - 1.3 10e9/L  HI   Absolute Eosinophils 0.2 0.0 - 0.7 10e9/L  HI   Absolute Basophils 0.0 0.0 - 0.2 10e9/L  HI   Abs Immature Granulocytes 0.0 0 - 0.4 10e9/L  HI   Absolute Nucleated RBC 0.0   HI            Urine Culture Aerobic Bacterial [[id number]] (Abnormal)  Resulted: 10/29/18 0654, Result status: Final result    Ordering provider: Long Snow MD  10/27/18 0935 Resulting lab: [facility] [place]    Specimen Information    Type Source Collected On   Catheterized Urine  10/27/18 0917          Components       Value Reference Range Flag Lab   Specimen Description Catheterized Urine      Culture Micro --  A HI   Result:         >100,000 colonies/mL  Proteus mirabilis              Procalcitonin [[id number]]  Resulted: 10/29/18 0550, Result status: Final result    Ordering provider: Sekou Griffith MD  10/28/18 2300 Resulting lab: [facility] [place]    Specimen Information    Type Source Collected On   Blood  10/29/18 0512          Components       Value Reference Range Flag Lab   Procalcitonin <0.05 ng/ml  HI   Comment:         <0.05 ng/ml  Normal  Recommendation: Very low risk of bacterial infection.   Discourage antibiotics unless strong clinical suspicion for serious infection.              TSH with free T4 reflex [[id number]]  Resulted: 10/29/18 0550, Result status: Final result    Ordering provider: Sekou Griffith MD  10/28/18 2300 Resulting lab: [facility] [place]    Specimen Information    Type Source Collected On   Blood  10/29/18 0512          Components       Value Reference Range Flag Lab   TSH 0.93 0.40 - 4.00 mU/L  HI            Comprehensive metabolic panel [[id number]]  (Abnormal)  Resulted: 10/29/18 0550, Result status: Final result    Ordering provider: Sekou Griffith MD  10/28/18 2300 Resulting lab: [facility]    Specimen Information    Type Source Collected On   Blood  10/29/18 0512          Components       Value Reference Range Flag Lab   Sodium 143 133 - 144 mmol/L  HI   Potassium 3.5 3.4 - 5.3 mmol/L  HI   Chloride 112 94 - 109 mmol/L H HI   Carbon Dioxide 25 20 - 32 mmol/L  HI   Anion Gap 6 3 - 14 mmol/L  HI   Glucose 98 70 - 99 mg/dL  HI   Urea Nitrogen 9 7 - 30 mg/dL  HI   Creatinine 0.55 0.52 - 1.04 mg/dL  HI   GFR Estimate >90 >60 mL/min/1.7m2  HI   Comment:  Non  GFR Calc   GFR Estimate If Black >90 >60 mL/min/1.7m2  HI   Comment:  African American GFR Calc   Calcium 7.3 8.5 - 10.1 mg/dL L HI   Bilirubin Total 0.4 0.2 - 1.3 mg/dL  HI   Albumin 2.7 3.4 - 5.0 g/dL L HI   Protein Total 6.2 6.8 - 8.8 g/dL L HI   Alkaline Phosphatase 114 40 - 150 U/L  HI    0 - 50 U/L H HI    0 - 45 U/L H HI            INR [[id number]] (Abnormal)  Resulted: 10/29/18 0534, Result status: Final result    Ordering provider: Sekou Griffith MD  10/29/18 0000 Resulting lab: [facility]    Specimen Information    Type Source Collected On   Blood  10/29/18 0512          Components       Value Reference Range Flag Lab   INR 3.40 0.80 - 1.20 H HI            CBC with platelets differential [[id number]] (Abnormal)  Resulted: 10/29/18 0522, Result status: Final result    Ordering provider: Sekou Griffith MD  10/28/18 2300 Resulting lab: [facility]    Specimen Information    Type Source Collected On   Blood  10/29/18 0512          Components       Value Reference Range Flag Lab   WBC 6.0 4.0 - 11.0 10e9/L  HI   RBC Count 3.92 3.8 - 5.2 10e12/L  HI   Hemoglobin 11.4 11.7 - 15.7 g/dL L HI   Hematocrit 34.5 35.0 - 47.0 % L HI   MCV 88 78 - 100 fl  HI   MCH 29.1 26.5 - 33.0 pg  HI   MCHC 33.0 31.5 - 36.5 g/dL  HI   RDW  14.9 10.0 - 15.0 %  HI   Platelet Count 123 150 - 450 10e9/L L HI   Diff Method Automated Method   HI   % Neutrophils 76.0 %  HI   % Lymphocytes 15.8 %  HI   % Monocytes 6.5 %  HI   % Eosinophils 1.2 %  HI   % Basophils 0.2 %  HI   % Immature Granulocytes 0.3 %  HI   Nucleated RBCs 0 0 /100  HI   Absolute Neutrophil 4.5 1.6 - 8.3 10e9/L  HI   Absolute Lymphocytes 0.9 0.8 - 5.3 10e9/L  HI   Absolute Monocytes 0.4 0.0 - 1.3 10e9/L  HI   Absolute Eosinophils 0.1 0.0 - 0.7 10e9/L  HI   Absolute Basophils 0.0 0.0 - 0.2 10e9/L  HI   Abs Immature Granulocytes 0.0 0 - 0.4 10e9/L  HI   Absolute Nucleated RBC 0.0   HI            Active MRSA Surveillance Culture [[id number]]  Resulted: 10/28/18 1059, Result status: Final result    Ordering provider: Sekou Griffith MD  10/27/18 1514 Resulting lab: [facility]    Specimen Information    Type Source Collected On   Nares Nose 10/27/18 1520          Components       Value Reference Range Flag Lab   Specimen Description Nares      Culture Micro No MRSA isolated   HI            Procalcitonin [[id number]]  Resulted: 10/28/18 0929, Result status: Final result    Ordering provider: Sekou Griffith MD  10/28/18 0854 Resulting lab: [facility]    Specimen Information    Type Source Collected On   Blood  10/28/18 0601          Components       Value Reference Range Flag Lab   Procalcitonin <0.05 ng/ml  HI   Comment:         <0.05 ng/ml  Normal  Recommendation: Very low risk of bacterial infection.   Discourage antibiotics unless strong clinical suspicion for serious infection.              Influenza A and B and RSV PCR [[id number]]  Resulted: 10/28/18 0734, Result status: Final result    Ordering provider: Sekou Griffith MD  10/27/18 1627 Resulting lab: [facility]    Specimen Information    Type Source Collected On   Nares Nasopharyngeal 10/28/18 0652          Components       Value Reference Range Flag Lab   Specimen Description  Nares   HI   Influenza A PCR Negative NEG^Negative  HI   Comment:         Flu A target RNA not detected, presumed negative for Influenza A or the viral   load is below the limit of detection.     Influenza B PCR Negative NEG^Negative  HI   Comment:         Flu B target RNA not detected, presumed negative for Influenza B or the viral   load is below the limit of detection.     Resp Syncytial Virus Negative NEG^Negative  HI   Comment:         RSV target RNA not detected, presumed negative for Respiratory Syncitial Virus   or the viral load is below the limit of detection.  FDA approved assay performed using [employer] GeneXpert(R) real-time PCR.              Basic metabolic panel [[id number]] (Abnormal)  Resulted: 10/28/18 0632, Result status: Final result    Ordering provider: Sekou Griffith MD  10/27/18 2300 Resulting lab: [facility]    Specimen Information    Type Source Collected On   Blood  10/28/18 0601          Components       Value Reference Range Flag Lab   Sodium 143 133 - 144 mmol/L  HI   Potassium 3.9 3.4 - 5.3 mmol/L  HI   Chloride 112 94 - 109 mmol/L H HI   Carbon Dioxide 23 20 - 32 mmol/L  HI   Anion Gap 8 3 - 14 mmol/L  HI   Glucose 125 70 - 99 mg/dL H HI   Urea Nitrogen 13 7 - 30 mg/dL  HI   Creatinine 0.64 0.52 - 1.04 mg/dL  HI   GFR Estimate 89 >60 mL/min/1.7m2  HI   Comment:  Non  GFR Calc   GFR Estimate If Black >90 >60 mL/min/1.7m2  HI   Comment:  African American GFR Calc   Calcium 7.6 8.5 - 10.1 mg/dL L HI            INR [[id number]] (Abnormal)  Resulted: 10/28/18 0627, Result status: Final result    Ordering provider: Sekou Griffith MD  10/28/18 0000 Resulting lab: [facility]    Specimen Information    Type Source Collected On   Blood  10/28/18 0601          Components       Value Reference Range Flag Lab   INR 2.73 0.80 - 1.20 H HI            CBC with platelets differential [[id number]] (Abnormal)  Resulted: 10/28/18 0618, Result status:  Final result    Ordering provider: Sekou Griffith MD  10/27/18 2300 Resulting lab: [facility]    Specimen Information    Type Source Collected On   Blood  10/28/18 0601          Components       Value Reference Range Flag Lab   WBC 7.0 4.0 - 11.0 10e9/L  HI   RBC Count 4.34 3.8 - 5.2 10e12/L  HI   Hemoglobin 12.6 11.7 - 15.7 g/dL  HI   Hematocrit 38.0 35.0 - 47.0 %  HI   MCV 88 78 - 100 fl  HI   MCH 29.0 26.5 - 33.0 pg  HI   MCHC 33.2 31.5 - 36.5 g/dL  HI   RDW 14.9 10.0 - 15.0 %  HI   Platelet Count 154 150 - 450 10e9/L  HI   Diff Method Automated Method   HI   % Neutrophils 86.7 %  HI   % Lymphocytes 7.9 %  HI   % Monocytes 4.7 %  HI   % Eosinophils 0.0 %  HI   % Basophils 0.1 %  HI   % Immature Granulocytes 0.6 %  HI   Nucleated RBCs 0 0 /100  HI   Absolute Neutrophil 6.1 1.6 - 8.3 10e9/L  HI   Absolute Lymphocytes 0.6 0.8 - 5.3 10e9/L L HI   Absolute Monocytes 0.3 0.0 - 1.3 10e9/L  HI   Absolute Eosinophils 0.0 0.0 - 0.7 10e9/L  HI   Absolute Basophils 0.0 0.0 - 0.2 10e9/L  HI   Abs Immature Granulocytes 0.0 0 - 0.4 10e9/L  HI   Absolute Nucleated RBC 0.0   HI            Testing Performed By     Lab - Abbreviation Name Director Address Valid Date Range    210 - HI [facility] Unknown [address] [place] [id number] 05/08/15 1057 - Present               Imaging Results - 3 Days      XR Chest Port 1 View [[id number]]  Resulted: 10/28/18 0728, Result status: Final result    Ordering provider: Sekou Griffith MD  10/28/18 0639 Resulted by: Clyde Skinner MD    Performed: 10/28/18 0650 - 10/28/18 0703 Resulting lab: RADIOLOGY RESULTS    Narrative:       PROCEDURE:  XR CHEST PORT 1 VW    HISTORY:  dyspnea; .     COMPARISON:  October 27, 2018    FINDINGS:   Heart is borderline in size. The pulmonary vasculature is normal.   There is persistent left lung opacity which is stable from previous  examination. Some new right lung opacity is noted suspicious for  pneumonia. There  is possible blunting of left costophrenic angle      Impression:       IMPRESSION:  Worsening right lung opacity consistent with pneumonia      ENDER SPARKS MD      Testing Performed By     Lab - Abbreviation Name Director Address Valid Date Range    104 - Rad Rslts RADIOLOGY RESULTS Unknown Unknown 05 1553 - Present               ECG/EMG Results      Echocardiogram [[id number]]  Resulted: 10/29/18 07, Result status: Edited Result - FINAL    Ordering provider: Sekou Worthy MD  10/29/18 0006 Resulted by: Carson Prasad MD    Performed: 10/29/18 0701 - 10/29/18 075 Resulting lab: RADIOLOGY RESULTS    Narrative:       [id number]  [id number]  [id number]  [id number]           [facility]  Echocardiography Laboratory  [address] [place] [address]     Name: ARACELIS SARAVIA  MRN: [id number]  : 1940  Study Date: 10/29/2018 07:22 AM  Age: 78 yrs  Gender: Female  Patient Location: [facility]  Reason For Study: Afib  History: Atrial Fibrillation  Ordering Physician: SEKOU WORTHY  Referring Physician: SEKOU WORTHY  Performed By: Hannah Sandy  Weight: 173 lb     _____________________________________________________________________________  __     Procedure  Technically difficult study. The patient's rhythm is atrial fibrillation.     _____________________________________________________________________________  __     Interpretation Summary  No pericardial effusion is present.  Left ventricular size is normal.  Moderately (EF 35-40%) reduced left ventricular function is present.  Diastolic function not assessed due to atrial fibrillation.  Moderate diffuse hypokinesis is present.  The right ventricle is normal size.  Global right ventricular function is normal.  Mild left atrial enlargement is present.  Mild mitral insufficiency is present.  Aortic valve is normal in structure and function.  Moderate tricuspid insufficiency is present.  Right ventricular  systolic pressure is 31mmHg above the right atrial pressure.  The pulmonic valve cannot be assessed.  The aorta root is normal.     _____________________________________________________________________________  __     Left Ventricle  Left ventricular size is normal. Moderately (EF 35-40%) reduced left  ventricular function is present. Diastolic function not assessed due to atrial  fibrillation. Moderate diffuse hypokinesis is present.        Right Ventricle  The right ventricle is normal size. Global right ventricular function is  normal.     Atria  Mild left atrial enlargement is present.     Mitral Valve  Mild mitral insufficiency is present.     Aortic Valve  Aortic valve is normal in structure and function.     Tricuspid Valve  Moderate tricuspid insufficiency is present. Right ventricular systolic  pressure is 31mmHg above the right atrial pressure.        Pulmonic Valve  The pulmonic valve cannot be assessed.     Vessels  The aorta root is normal.     Pericardium  No pericardial effusion is present.     Miscellaneous  A left pleural effusion is present.     _____________________________________________________________________________  __  MMode/2D Measurements & Calculations  IVSd: 0.79 cm  IVSs: 0.84 cm  LVIDd: 5.6 cm  LVIDs: 4.8 cm  LVPWd: 0.73 cm  LVPWs: 1.1 cm  FS: 14.0 %  LV mass(C)d: 154.6 grams  Ao root diam: 2.7 cm  LA dimension: 4.1 cm  LA/Ao: 1.5  LVOT diam: 2.0 cm  LVOT area: 3.1 cm2  RWT: 0.26        Time Measurements  MM HR: 90.9 BPM     Doppler Measurements & Calculations  MV E max dheeraj: 106.3 cm/sec  MV A max dheeraj: 33.7 cm/sec  MV E/A: 3.2  MV dec slope: 471.6 cm/sec2  MV dec time: 0.23 sec  Ao V2 max: 98.2 cm/sec  Ao max PG: 3.9 mmHg  Ao V2 mean: 74.5 cm/sec  Ao mean P.4 mmHg  Ao V2 VTI: 22.0 cm  ARMOND(I,D): 2.0 cm2  ARMOND(V,D): 2.0 cm2  LV V1 max P.6 mmHg  LV V1 max: 63.5 cm/sec  LV V1 VTI: 14.5 cm  CO(LVOT): 9.2 l/min  SV(LVOT): 44.9 ml  TV max P.2 mmHg  TR max dheeraj: 279.1 cm/sec  TR  max P.1 mmHg  AV Reynold Ratio (DI): 0.65        _____________________________________________________________________________  __        Report approved by: Evin Mijares 10/29/2018 05:36 PM       1    Type Source Collected On     10/29/18 0722          View Image (below)              Encounter-Level Documents:     There are no encounter-level documents.      Order-Level Documents:     There are no order-level documents.

## 2018-10-27 NOTE — PROGRESS NOTES
Spoke with Gaviota KENDRICK with [facility] and did a chart review.  Danette is not currently on hospice.  Updated Dr. Griffith and Catalina KENDRICK.

## 2018-10-27 NOTE — H&P
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 Was sent here from SNF for notable dyspnea. Evaluation in the ED revealed patient was in atrial fibrillation with rapid ventricular rate and CXR concerning for possible right lower lobe pneumonia.    1. Atrial fibrillation with rapid ventricular rate:  - Received diltiazem IV bolus in ED and now back in RVR. Will give IV metoprolol 5 mg and start on oral metoprolol 50 mg PO BID.   - Telemetry monitoring  - Continue on warfarin, INR 2.37  - Obtain echocardiogram on Monday    2. Acute hypoxic respiratory failure secondary to possible right lower lobe pneumonia:  There is questionable infiltrate in the RLL upon my read of her CXR, although formal read suggests left lung opacity.   -Continue with Ceftriaxone and Azithromycin empirically   -IVF and recheck AM CXR  -O2 support via nasal canula and wean as able    3. CVA with right hemiparesis  She does have some speech and language deficit. Review of the medical record also indicates hx of dementia. Upon discussion with patient's daughter, she indicates that patient had no cognitive deficits prior to her stroke 3 years ago and that primarily her issue is more of a language barrier, making discernment of possible underlying dementia difficult.     4. Hypertension:  Reasonably controlled. Is not on oral antihypertensive PTA.   -Continue to monitor BP with addition of beta blocker as per above    5. Hx seizure  Continue with her Keppra    6. 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 Was sent here from SNF for notable dyspnea. Patient unable to provide much history due to speech and language deficit. Was noted to have a moderate amount of wheezing and found to be in atrial fibrillation with rapid ventricular rate (NR to 170s) on telemetry. She was given a bolus of 25 mg IV diltiazem in the ED, which brought her heart rate to the 80s. CXR revealed some question of RLL infiltrate and some blunting of the right costovertebral angle. She was hypoxic at 80% room air on arrival, sats increasing to 97% on 3L nasal canula. She is afebrile with no leukocytosis.  Lactic acid was slightly elevated at 3.6 and quickly decreased to 2.2 within the subsequent 4-5 hours. No fluid bolus given in the ED. Blood cultures were drawn and ceftriaxone and azithromycin were started empirically. She had bilateral lower extremity doppler ultrasound with no evidence for DVT. Upon arrival here to the floor, her HR has increased up into the 160s once again. She is lying in bed comfortably. I am really unable to elicit very much meaningful information from her due to deficits from her previous CVA. Her  is a bedside, he suffers from dementia and also is unable to add much to the history.     Past Medical History    Past Medical History:   Diagnosis Date     Anxiety      Aphasia      Atrial fibrillation with RVR (H)      Bilateral leg paresthesia      Bradycardia      Chronic anticoagulation      Complex partial seizure (H)      CVA (cerebral vascular accident) (H)     with right  hemiparesis due to embolic stroke involving left posterior cerebral artery     Dementia      Hypertension      Orthostatic tremor      Osteopenia      Speech and language deficit as late effect of cerebrovascular accident (CVA)        Past Surgical History   I have reviewed this patient's surgical history and updated it with pertinent information if needed.  Past Surgical History:   Procedure Laterality Date     CHOLECYSTECTOMY       COLONOSCOPY  12/2009     hysterectomy         Prior to Admission Medications   Prior to Admission Medications   Prescriptions Last Dose Informant Patient Reported? Taking?   ACETAMINOPHEN PO 10/23/2018 at 0900  Yes Yes   Sig: Take 650 mg by mouth every 6 hours as needed for pain   Acetaminophen (TYLENOL PO) 10/26/2018 at 1800  Yes Yes   Sig: Take 650 mg by mouth 2 times daily    Atorvastatin Calcium (LIPITOR PO) 10/26/2018 at 1800  Yes Yes   Sig: Take 40 mg by mouth daily   HYDROmorphone HCl (DILAUDID PO) Unknown at Unknown time  Yes No   Sig: Take 2 mg by mouth every 4 hours as needed for moderate to severe pain 2 to 4 mg oral every 4 hours prn pain   LEVETIRACETAM PO 10/26/2018 at 2000  Yes Yes   Sig: Take 750 mg by mouth 2 times daily   PREDNISONE PO 10/26/2018 at 0800  Yes Yes   Sig: Take 5 mg by mouth daily   RisperiDONE (RISPERDAL PO) 10/26/2018 at 1800  Yes Yes   Sig: Take 0.25 mg by mouth 2 times daily   SERTRALINE HCL PO 10/26/2018 at 0800  Yes Yes   Sig: Take 75 mg by mouth daily    Vitamin D, Cholecalciferol, 1000 units CAPS 10/26/2018 at 0800  No Yes   Sig: Take 2,000 Units by mouth daily   Warfarin Sodium (COUMADIN PO) 10/25/2018 at 1800  Yes Yes   Sig: Take 2.5 mg by mouth Tu, Th   Warfarin Sodium (COUMADIN PO) 10/26/2018 at 1800  Yes Yes   Sig: Take 5 mg by mouth M, W, F, Sa, Puga   bisacodyl (DULCOLAX) 10 MG Suppository Unknown at Unknown time  Yes No   Sig: Place 10 mg rectally every 3 days PRN   guaiFENesin-dextromethorphan (ROBITUSSIN DM) 100-10 MG/5ML syrup Unknown at  Unknown time  Yes No   Sig: Take 5 mLs by mouth every 4 hours as needed for cough Take 5 to 10 mls q 4 hours as needed   magnesium hydroxide (MILK OF MAGNESIA) 400 MG/5ML suspension Unknown at Unknown time  Yes No   Sig: Take 30 mLs by mouth every 3 days PRN   psyllium (KONSYL) 100 % POWD 10/26/2018 at 0800  Yes Yes   Sig: Take 1 Tablespoonful by mouth daily One tablespoon mixed with juice   senna-docusate (SENOKOT-S;PERICOLACE) 8.6-50 MG per tablet 10/26/2018 at 1800  Yes Yes   Sig: Take 1 tablet by mouth 2 times daily       Facility-Administered Medications: None     Allergies   Allergies   Allergen Reactions     Bacitracin        Social History   Social History     Social History     Marital status:      Spouse name: N/A     Number of children: N/A     Years of education: N/A     Occupational History     Not on file.     Social History Main Topics     Smoking status: Not on file     Smokeless tobacco: Not on file     Alcohol use Not on file     Drug use: Not on file     Sexual activity: Not on file     Other Topics Concern     Not on file     Social History Narrative       Family History   I have reviewed this patient's family history and updated it with pertinent information if needed.   No family history on file.    Review of Systems   Review of systems not obtained due to patient factors - speech and language barrier due to previous CVA    Physical Exam   Temp: 97.8  F (36.6  C) Temp src: Tympanic BP: 117/73 Pulse: 118 Heart Rate: 94 Resp: 20 SpO2: 96 % O2 Device: Nasal cannula Oxygen Delivery: 3 LPM  Vital Signs with Ranges  Temp:  [97.2  F (36.2  C)-97.8  F (36.6  C)] 97.8  F (36.6  C)  Pulse:  [118-174] 118  Heart Rate:  [] 94  Resp:  [18-28] 20  BP: ()/() 117/73  SpO2:  [80 %-99 %] 96 %  173 lbs .98 oz    Constitutional:   awake, alert, cooperative, no apparent distress, and appears stated age   , Eyes:   Lids and lashes normal, pupils equal, round and reactive to light, extra  ocular muscles intact, sclera clear, conjunctiva normal   , ENT:   Normocephalic, without obvious abnormality, atraumatic, sinuses nontender on palpation, external ears without lesions, oral pharynx with dry mucous membranes, tonsils without erythema or exudates, gums normal and good dentition.   , Neck:   Supple, symmetrical, trachea midline, no adenopathy, thyroid symmetric, not enlarged and no tenderness, skin normal   , Hematologic / Lymphatic:   no cervical lymphadenopathy and no supraclavicular lymphadenopathy   , Lungs:   No increased work of breathing, good air exchange, clear to auscultation bilaterally, no crackles or wheezing   , Cardiovascular:   Normal apical impulse, irregular and tachycardic, normal S1 and S2, no S3 or S4, and no murmur noted   , Abdomen:   No scars, normal bowel sounds, soft, non-distended, non-tender, no masses palpated, no hepatosplenomegally   , Musculoskeletal:   There is no redness, warmth, or swelling of the joints.  Full range of motion noted.  Right hemiparesis.  Tone is normal.   , Neuropsychiatric:   General: normal, calm and normal eye contact  Affect: normal  Orientation: difficult to assess due to language barrier s/p CVA    and Skin:   no redness, warmth, or swelling, no rashes and no lesions       Data   Data reviewed today:  I personally reviewed the chest x-ray image(s) showing findings as per above. EKG showed atrial fibrillation with rapid ventricular rate. No apparent ischemic changes.    Recent Labs  Lab 10/27/18  0744   WBC 6.0   HGB 13.9   MCV 88      INR 2.37*      POTASSIUM 4.0   CHLORIDE 108   CO2 30   BUN 11   CR 0.70   ANIONGAP 5   SILVIO 8.0*   GLC 88   ALBUMIN 3.2*   PROTTOTAL 7.5   BILITOTAL 0.4   ALKPHOS 91   ALT 24   AST 17   TROPI <0.015     Lactic Acid   Date Value Ref Range Status   10/27/2018 2.2 (H) 0.4 - 2.0 mmol/L Final     Comment:     Significant value called to and read back by  MASSIEL BROWN AT 1249 ON 10/27/18 BY [facility]      10/27/2018 3.6 (H) 0.4 - 2.0 mmol/L Final     Comment:     Significant value called to and read back by  BHUPINDER STAFFORD AT 0824 ON 10/27/18 BY [facility]     02/22/2018 1.6 0.4 - 2.0 mmol/L Final       Recent Results (from the past 24 hour(s))   XR Chest Port 1 View    Narrative    PROCEDURE:  XR CHEST PORT 1 VW    HISTORY:  sob; .     COMPARISON:  November 24, 2017    FINDINGS:   Heart is  borderline in size. The pulmonary vasculature is normal.   There is abnormal increased density in the left lower lung consistent  with atelectasis or pneumonia. There is mild blunting of the  costophrenic angle on the left.      Impression    IMPRESSION:  Left lung opacity consistent with atelectasis or  pneumonia      ENDER SPARKS MD   US Lower Extremity Venous Duplex Bilateral    Narrative    Exam:US LOWER EXTREMITY VENOUS DUPLEX BILATERAL    History:Leg swelling    Comparisons:none    Technique: Venous duplex ultrasonography of the bilateral lower  extremities was performed.     Findings: The common femoral veins, superficial femoral veins and  popliteal veins are fully compressible with spontaneous and  augmentable venous flow.           Impression    Impression: No evidence of deep venous thrombosis within the lower  extremities.    ENDER SPARKS MD

## 2018-10-27 NOTE — ED NOTES
Pt brought in via EMS from [facility] in [place] for evaluation of respiratory distress. EMS reports staff went into see pt at 6:30am and found her having difficulty breathing.

## 2018-10-27 NOTE — ED PROVIDER NOTES
I assumed care at change of shift.  Patient now at stable rate but remains hypoxic, she is 95% on 3L.  Not on oxygen at NH.  She is on chronic prednisone, this was initially unclear why, it appears it was for osteoarthritis.    Results for orders placed or performed during the hospital encounter of 10/27/18   XR Chest Port 1 View    Narrative    PROCEDURE:  XR CHEST PORT 1 VW    HISTORY:  sob; .     COMPARISON:  November 24, 2017    FINDINGS:   Heart is  borderline in size. The pulmonary vasculature is normal.   There is abnormal increased density in the left lower lung consistent  with atelectasis or pneumonia. There is mild blunting of the  costophrenic angle on the left.      Impression    IMPRESSION:  Left lung opacity consistent with atelectasis or  pneumonia      ENDER SPARKS MD   US Lower Extremity Venous Duplex Bilateral    Narrative    Exam:US LOWER EXTREMITY VENOUS DUPLEX BILATERAL    History:Leg swelling    Comparisons:none    Technique: Venous duplex ultrasonography of the bilateral lower  extremities was performed.     Findings: The common femoral veins, superficial femoral veins and  popliteal veins are fully compressible with spontaneous and  augmentable venous flow.           Impression    Impression: No evidence of deep venous thrombosis within the lower  extremities.    ENDER SPARKS MD         Given her immunosuppression, will admit for antibiotics.  Dr. Griffith accepting.    Clinical Impression: pneumonia       Brenda Vieyra MD  10/27/18 5264

## 2018-10-28 ENCOUNTER — APPOINTMENT (OUTPATIENT)
Dept: GENERAL RADIOLOGY | Facility: HOSPITAL | Age: 78
DRG: 193 | End: 2018-10-28
Attending: INTERNAL MEDICINE
Payer: MEDICARE

## 2018-10-28 LAB
ANION GAP SERPL CALCULATED.3IONS-SCNC: 8 MMOL/L (ref 3–14)
BACTERIA SPEC CULT: NORMAL
BASOPHILS # BLD AUTO: 0 10E9/L (ref 0–0.2)
BASOPHILS NFR BLD AUTO: 0.1 %
BUN SERPL-MCNC: 13 MG/DL (ref 7–30)
CALCIUM SERPL-MCNC: 7.6 MG/DL (ref 8.5–10.1)
CHLORIDE SERPL-SCNC: 112 MMOL/L (ref 94–109)
CO2 SERPL-SCNC: 23 MMOL/L (ref 20–32)
CREAT SERPL-MCNC: 0.64 MG/DL (ref 0.52–1.04)
DIFFERENTIAL METHOD BLD: ABNORMAL
EOSINOPHIL # BLD AUTO: 0 10E9/L (ref 0–0.7)
EOSINOPHIL NFR BLD AUTO: 0 %
ERYTHROCYTE [DISTWIDTH] IN BLOOD BY AUTOMATED COUNT: 14.9 % (ref 10–15)
FLUAV+FLUBV RNA SPEC QL NAA+PROBE: NEGATIVE
FLUAV+FLUBV RNA SPEC QL NAA+PROBE: NEGATIVE
GFR SERPL CREATININE-BSD FRML MDRD: 89 ML/MIN/1.7M2
GLUCOSE SERPL-MCNC: 125 MG/DL (ref 70–99)
HCT VFR BLD AUTO: 38 % (ref 35–47)
HGB BLD-MCNC: 12.6 G/DL (ref 11.7–15.7)
IMM GRANULOCYTES # BLD: 0 10E9/L (ref 0–0.4)
IMM GRANULOCYTES NFR BLD: 0.6 %
INR PPP: 2.73 (ref 0.8–1.2)
LYMPHOCYTES # BLD AUTO: 0.6 10E9/L (ref 0.8–5.3)
LYMPHOCYTES NFR BLD AUTO: 7.9 %
MCH RBC QN AUTO: 29 PG (ref 26.5–33)
MCHC RBC AUTO-ENTMCNC: 33.2 G/DL (ref 31.5–36.5)
MCV RBC AUTO: 88 FL (ref 78–100)
MONOCYTES # BLD AUTO: 0.3 10E9/L (ref 0–1.3)
MONOCYTES NFR BLD AUTO: 4.7 %
NEUTROPHILS # BLD AUTO: 6.1 10E9/L (ref 1.6–8.3)
NEUTROPHILS NFR BLD AUTO: 86.7 %
NRBC # BLD AUTO: 0 10*3/UL
NRBC BLD AUTO-RTO: 0 /100
PLATELET # BLD AUTO: 154 10E9/L (ref 150–450)
POTASSIUM SERPL-SCNC: 3.9 MMOL/L (ref 3.4–5.3)
PROCALCITONIN SERPL-MCNC: <0.05 NG/ML
RBC # BLD AUTO: 4.34 10E12/L (ref 3.8–5.2)
RSV RNA SPEC NAA+PROBE: NEGATIVE
SODIUM SERPL-SCNC: 143 MMOL/L (ref 133–144)
SPECIMEN SOURCE: NORMAL
SPECIMEN SOURCE: NORMAL
WBC # BLD AUTO: 7 10E9/L (ref 4–11)

## 2018-10-28 PROCEDURE — 25000125 ZZHC RX 250: Performed by: INTERNAL MEDICINE

## 2018-10-28 PROCEDURE — 40000275 ZZH STATISTIC RCP TIME EA 10 MIN

## 2018-10-28 PROCEDURE — 99232 SBSQ HOSP IP/OBS MODERATE 35: CPT | Performed by: INTERNAL MEDICINE

## 2018-10-28 PROCEDURE — 25000132 ZZH RX MED GY IP 250 OP 250 PS 637: Mod: GY | Performed by: INTERNAL MEDICINE

## 2018-10-28 PROCEDURE — 12000000 ZZH R&B MED SURG/OB

## 2018-10-28 PROCEDURE — 87631 RESP VIRUS 3-5 TARGETS: CPT | Performed by: INTERNAL MEDICINE

## 2018-10-28 PROCEDURE — 84145 PROCALCITONIN (PCT): CPT | Performed by: INTERNAL MEDICINE

## 2018-10-28 PROCEDURE — 25000128 H RX IP 250 OP 636: Performed by: INTERNAL MEDICINE

## 2018-10-28 PROCEDURE — A9270 NON-COVERED ITEM OR SERVICE: HCPCS | Mod: GY | Performed by: INTERNAL MEDICINE

## 2018-10-28 PROCEDURE — 96361 HYDRATE IV INFUSION ADD-ON: CPT

## 2018-10-28 PROCEDURE — G0378 HOSPITAL OBSERVATION PER HR: HCPCS

## 2018-10-28 PROCEDURE — 36415 COLL VENOUS BLD VENIPUNCTURE: CPT | Performed by: INTERNAL MEDICINE

## 2018-10-28 PROCEDURE — 71045 X-RAY EXAM CHEST 1 VIEW: CPT | Mod: TC

## 2018-10-28 PROCEDURE — 85610 PROTHROMBIN TIME: CPT | Performed by: INTERNAL MEDICINE

## 2018-10-28 PROCEDURE — 80048 BASIC METABOLIC PNL TOTAL CA: CPT | Performed by: INTERNAL MEDICINE

## 2018-10-28 PROCEDURE — 85025 COMPLETE CBC W/AUTO DIFF WBC: CPT | Performed by: INTERNAL MEDICINE

## 2018-10-28 RX ORDER — IPRATROPIUM BROMIDE AND ALBUTEROL SULFATE 2.5; .5 MG/3ML; MG/3ML
3 SOLUTION RESPIRATORY (INHALATION) EVERY 4 HOURS PRN
Status: DISCONTINUED | OUTPATIENT
Start: 2018-10-28 | End: 2018-10-31 | Stop reason: HOSPADM

## 2018-10-28 RX ORDER — DILTIAZEM HYDROCHLORIDE 120 MG/1
120 CAPSULE, COATED, EXTENDED RELEASE ORAL ONCE
Status: COMPLETED | OUTPATIENT
Start: 2018-10-28 | End: 2018-10-28

## 2018-10-28 RX ORDER — DILTIAZEM HYDROCHLORIDE 120 MG/1
120 CAPSULE, COATED, EXTENDED RELEASE ORAL DAILY
Status: DISCONTINUED | OUTPATIENT
Start: 2018-10-28 | End: 2018-10-28

## 2018-10-28 RX ORDER — IPRATROPIUM BROMIDE AND ALBUTEROL SULFATE 2.5; .5 MG/3ML; MG/3ML
3 SOLUTION RESPIRATORY (INHALATION)
Status: DISCONTINUED | OUTPATIENT
Start: 2018-10-28 | End: 2018-10-28

## 2018-10-28 RX ORDER — DILTIAZEM HYDROCHLORIDE 120 MG/1
120 CAPSULE, COATED, EXTENDED RELEASE ORAL DAILY
Status: DISCONTINUED | OUTPATIENT
Start: 2018-10-29 | End: 2018-10-30

## 2018-10-28 RX ORDER — DILTIAZEM HYDROCHLORIDE 30 MG/1
30 TABLET, FILM COATED ORAL EVERY 6 HOURS SCHEDULED
Status: DISCONTINUED | OUTPATIENT
Start: 2018-10-28 | End: 2018-10-29

## 2018-10-28 RX ORDER — IPRATROPIUM BROMIDE AND ALBUTEROL SULFATE .5; 3 MG/3ML; MG/3ML
3 SOLUTION RESPIRATORY (INHALATION) EVERY 4 HOURS PRN
Status: DISCONTINUED | OUTPATIENT
Start: 2018-10-28 | End: 2018-10-28

## 2018-10-28 RX ORDER — WARFARIN SODIUM 1 MG/1
1 TABLET ORAL
Status: COMPLETED | OUTPATIENT
Start: 2018-10-28 | End: 2018-10-28

## 2018-10-28 RX ORDER — IPRATROPIUM BROMIDE AND ALBUTEROL SULFATE 2.5; .5 MG/3ML; MG/3ML
3 SOLUTION RESPIRATORY (INHALATION) EVERY 4 HOURS PRN
Status: DISCONTINUED | OUTPATIENT
Start: 2018-10-28 | End: 2018-10-28

## 2018-10-28 RX ADMIN — LEVETIRACETAM 750 MG: 250 TABLET, FILM COATED ORAL at 20:37

## 2018-10-28 RX ADMIN — LEVETIRACETAM 750 MG: 250 TABLET, FILM COATED ORAL at 09:24

## 2018-10-28 RX ADMIN — SENNOSIDES AND DOCUSATE SODIUM 1 TABLET: 8.6; 5 TABLET ORAL at 20:37

## 2018-10-28 RX ADMIN — DILTIAZEM HYDROCHLORIDE 30 MG: 30 TABLET, FILM COATED ORAL at 07:52

## 2018-10-28 RX ADMIN — ACETAMINOPHEN 650 MG: 325 TABLET, FILM COATED ORAL at 12:58

## 2018-10-28 RX ADMIN — AZITHROMYCIN MONOHYDRATE 250 MG: 500 INJECTION, POWDER, LYOPHILIZED, FOR SOLUTION INTRAVENOUS at 11:28

## 2018-10-28 RX ADMIN — SENNOSIDES AND DOCUSATE SODIUM 1 TABLET: 8.6; 5 TABLET ORAL at 09:28

## 2018-10-28 RX ADMIN — SODIUM CHLORIDE, PRESERVATIVE FREE: 5 INJECTION INTRAVENOUS at 06:32

## 2018-10-28 RX ADMIN — WARFARIN SODIUM 1 MG: 1 TABLET ORAL at 18:15

## 2018-10-28 RX ADMIN — SERTRALINE HYDROCHLORIDE 75 MG: 50 TABLET ORAL at 09:23

## 2018-10-28 RX ADMIN — CEFTRIAXONE SODIUM 2 G: 2 INJECTION, SOLUTION INTRAVENOUS at 10:32

## 2018-10-28 RX ADMIN — SODIUM CHLORIDE, PRESERVATIVE FREE: 5 INJECTION INTRAVENOUS at 00:56

## 2018-10-28 RX ADMIN — DILTIAZEM HYDROCHLORIDE 120 MG: 120 CAPSULE, COATED, EXTENDED RELEASE ORAL at 03:58

## 2018-10-28 RX ADMIN — VITAMIN D, TAB 1000IU (100/BT) 2000 UNITS: 25 TAB at 09:27

## 2018-10-28 RX ADMIN — SODIUM CHLORIDE, PRESERVATIVE FREE: 5 INJECTION INTRAVENOUS at 21:39

## 2018-10-28 RX ADMIN — RISPERIDONE 0.25 MG: 0.25 TABLET ORAL at 09:28

## 2018-10-28 RX ADMIN — HYDROMORPHONE HYDROCHLORIDE 2 MG: 2 TABLET ORAL at 02:32

## 2018-10-28 RX ADMIN — PREDNISONE 5 MG: 5 TABLET ORAL at 09:21

## 2018-10-28 RX ADMIN — PSYLLIUM HUSK 1 PACKET: 3.4 POWDER ORAL at 09:21

## 2018-10-28 RX ADMIN — RISPERIDONE 0.25 MG: 0.25 TABLET ORAL at 20:37

## 2018-10-28 RX ADMIN — DILTIAZEM HYDROCHLORIDE 30 MG: 30 TABLET, FILM COATED ORAL at 20:26

## 2018-10-28 RX ADMIN — ATORVASTATIN CALCIUM 40 MG: 40 TABLET, FILM COATED ORAL at 18:15

## 2018-10-28 ASSESSMENT — ACTIVITIES OF DAILY LIVING (ADL)
ADLS_ACUITY_SCORE: 30

## 2018-10-28 NOTE — PROVIDER NOTIFICATION
Pt HR- a fib 's. Pt restless, moaning with labored breathing. Spo2 92% on 1 LPM. Dr. Tai connelly. Will continue to monitor.

## 2018-10-28 NOTE — PLAN OF CARE
"Problem: Patient Care Overview  Goal: Plan of Care/Patient Progress Review  Outcome: No Change  Reason for hospital stay:  Pneumonia    Most recent vitals: /92  Pulse (!) 136  Temp 98.7  F (37.1  C) (Tympanic)  Resp 12  Ht 1.702 m (5' 7\")  Wt 78.5 kg (173 lb 1 oz)  SpO2 (!) 91%  BMI 27.11 kg/m2  Pain Management:  C/o pain mid-day, Pt unable to report where, administered Tylenol with sleeping on recheck  Orientation:  Alert with noted lethargy today, very sleepy.  Cardiac:  Tele reading Afib 120-150's per ICU. Around 1250 ICU notified RN that HR was Afib 60's, MD aware  Respiratory:  LS diminished. Periods of apnea. On 2.5 LPM to maintain O2 above 90%. Does have periods of dipping to 86% rebounds with encouragement  GI:  BS active. No emesis  :  Incontinent of urine  Diet: Reg diet. No appetite, did eat some ice cream and offered fluids frequently  Skin Issues:  Blanchable redness to coccyx, TRQ2  IVF:  NS at 50  ABX:  Zithro and Rocephin  Mobility:  A2, not out of bed with HR and lethargy  Safety:  Call light within reach. Bed alarm active    Comments:  bedside part of afternoon    10/28/2018  2:05 PM  Patricia Edward          "

## 2018-10-28 NOTE — PROVIDER NOTIFICATION
Dr Griffith notified of HR decrease per ICU on the telemetry. VS documented by ICU RN. Dr Griffith would like the diltiazem 30 mg q6h to be held and to monitor how the previously given (0358) diltiazem 120 mg 24h capsule affects the Pt.

## 2018-10-28 NOTE — PLAN OF CARE
Problem: Pneumonia (Adult)  Goal: Signs and Symptoms of Listed Potential Problems Will be Absent, Minimized or Managed (Pneumonia)  Signs and symptoms of listed potential problems will be absent, minimized or managed by discharge/transition of care (reference Pneumonia (Adult) CPG).   Outcome: No Change  [place] [facility] Observation to Admission Note:    Patient status changed from Observation to Inpatient admission on 10/28/2018 at approximately 8:57 AM. Admission required documentation completed. Will continue to monitor and document as needed.     Inpatient Nursing criteria below was met (In addition to previous Observation criteria listed):    Core Measure diagnosis present:Yes. If yes, state diagnosis: Pneumonia    Clergy visit ordered if patient requests: N/A    Fall Prevention Medium: Care plan updated, education given and             documented, sticker and magnet in place: Yes    Care Plan updated to include appropriate Clinical Practice Guideline(s): Yes    Education Documented (including initial assessment): Yes    Patient has discharge needs : No If yes, please explain:Lives at [facility]

## 2018-10-28 NOTE — PLAN OF CARE
In to see pt. Tele recordings HR occasionally down to 40s, even 35. Occasional PVC and pauses also.  Pt is sleeping but easily awakened, /68. Palpable and auscultated HR 50-60s and irregular.   Primary RN updated

## 2018-10-28 NOTE — PROGRESS NOTES
Spoke with daughter, Brooke via phone conversation.  Reviewed Medicare letter.      Assessment completed see flowsheet.      Dx: pneumonia       Lives with: Lives With: facility resident ([facility] )    Living at: Living Arrangements: extended care facility ([facility])    Equipment used: likely wheel chair, walker- will verify with [facility]     Support System: Description of Support System: Involved, Supportive        Primary PCP: Crow Taylor    POA/Guardian: unsure       Health Care Directive: will verfiy with Kristy- only POLST on file    Pharmacy: [facility]     :  To be determined     Homecare/[facility]:   MA       Adequate Resources for needs (housing, utilities, food/med): YES    Meds and appointments management: YES    Transportation: Agency transportation      Able to Return to Prior Living Arrangements: YES    : NO      Goals: return to [facility]     Barriers: no barriers identified     Needs: Demonstrates Competency    SHARIF: none     Discharge Plan: return to [facility] via agency transportation.

## 2018-10-28 NOTE — PLAN OF CARE
Face to face report given with opportunity to observe patient.    Report given to MIREILLE Kapadia   10/28/2018  3:42 PM

## 2018-10-28 NOTE — PLAN OF CARE
Problem: Patient Care Overview  Goal: Plan of Care/Patient Progress Review  Outcome: No Change    Temp: 97.2  F (36.2  C) Temp src: Temporal BP: 144/90 (manual)  Heart Rate: 102 Resp: 12 SpO2: 93 % O2 Device: Nasal cannula Oxygen Delivery: 2.5 LPM     Pt is alert and disoriented x3. Pt exhibits aphasia though is able to nod yes or no in response to questions. Upon initial assessment pt was resting comfortably. Apnea noted when pt is asleep. Spo2 maintained in mid 90's on 2.5 lpm. As shift progressed pt became more restless with increase in HR [id number]'s and an increase in work of breathing. Pt medicated with po Dilaudid with no change.  MD notified and ordered po 120 mg diltiazem 24 capsule. HR @ 0530- 94. Lungs sounds coarse in bases. Per MD order IV fluids stopped from 0400 to 0600.  Pt turn/repo q2h. Incontinent of urine and stool. Does not use call light light, alarms audible and activated.      10/28/18 0515   OTHER   Plan Of Care Reviewed With patient   Plan of Care Review   Progress unable to show any progress toward functional goals       Face to face report given with opportunity to observe patient.    Report given to Patricia Olvera   10/28/2018  7:33 AM

## 2018-10-28 NOTE — PROVIDER NOTIFICATION
10/28/18 0041   Vital Signs   /87       Dr. Lujan updated on pt elevated BP. No new orders. Will continue to monitor.

## 2018-10-28 NOTE — PHARMACY
[facility]    Pharmacy      Antimicrobial Stewardship Note     Current antimicrobial therapy:  Anti-infectives (Future)    Start     Dose/Rate Route Frequency Ordered Stop    10/28/18 1130  azithromycin (ZITHROMAX) 250 mg in sodium chloride 0.9 % 250 mL intermittent infusion      250 mg  250 mL/hr over 1 Hours Intravenous EVERY 24 HOURS 10/27/18 1608 11/01/18 1129    10/28/18 1100  cefTRIAXone IN D5W (ROCEPHIN) intermittent infusion 2 g      2 g  100 mL/hr over 30 Minutes Intravenous EVERY 24 HOURS 10/27/18 1608            Indication: CAP    Days of Therapy: 2     Pertinent labs:  Creatinine   Creatinine   Date Value Ref [facility] Status   10/28/2018 0.64 0.52 - 1.04 mg/dL Final   10/27/2018 0.70 0.52 - 1.04 mg/dL Final   02/22/2018 0.67 0.52 - 1.04 mg/dL Final     WBC   WBC   Date Value Ref Range Status   10/28/2018 7.0 4.0 - 11.0 10e9/L Final   10/27/2018 6.0 4.0 - 11.0 10e9/L Final   02/22/2018 5.7 4.0 - 11.0 10e9/L Final     Procalcitonin   Procalcitonin   Date Value Ref Range Status   10/28/2018 <0.05 ng/ml Final     Comment:     <0.05 ng/ml  Normal  Recommendation: Very low risk of bacterial infection.   Discourage antibiotics unless strong clinical suspicion for serious infection.       CRP   CRP Inflammation   Date Value Ref [facility] Status   10/27/2018 3.3 0.0 - 8.0 mg/L Final   02/22/2018 41.6 (H) 0.0 - 8.0 mg/L Final       Culture Results:   Influenza A and B and RSV PCR [[id number]] Collected: 10/28/18 0652      Order Status: Completed Lab Status: Final result Updated: 10/28/18 0734     Specimen: Nares from Nasopharyngeal       Specimen Description Nares      Influenza A PCR Negative       Flu A target RNA not detected, presumed negative for Influenza A or the viral   load is below the limit of detection.            Influenza B PCR Negative       Flu B target RNA not detected, presumed negative for Influenza B or the viral   load is below the limit of detection.            Resp Syncytial Virus Negative        RSV target RNA not detected, presumed negative for Respiratory Syncitial Virus    or the viral load is below the limit of detection.   FDA approved assay performed using [employer] GeneXpert(R) real-time PCR.           Sputum Culture Aerobic Bacterial      Order Status: No result Lab Status: No result      Specimen: Sputum      Gram stain      Order Status: No result Lab Status: No result      Specimen: Sputum      Active MRSA Surveillance Culture [[id number]] Collected: 10/27/18 1520     Order Status: Completed Lab Status: Final result Updated: 10/28/18 1059     Specimen: Nares from Nose       Specimen Description Nares      Culture Micro No MRSA isolated     Blood culture [[id number]] Collected: 10/27/18 1034     Order Status: Completed Lab Status: Preliminary result Updated: 10/28/18 1256     Specimen: Blood       Specimen Description Blood      Culture Micro No growth after 1 day     Blood culture      Order Status: Canceled Lab Status: No result      Specimen: Blood      Urine Culture Aerobic Bacterial [[id number]] (Abnormal) Collected: 10/27/18 0917     Order Status: Completed Lab Status: Preliminary result Updated: 10/28/18 0756     Specimen: Catheterized Urine       Specimen Description Catheterized Urine      Culture Micro >100,000 colonies/mL   Gram negative rods   Identification and susceptibility to follow.    (A)     Blood culture [[id number]] Collected: 10/27/18 0745     Order Status: Completed Lab Status: Preliminary result Updated: 10/28/18 1256     Specimen: Blood       Specimen Description Blood      Culture Micro No growth after 1 day          Recommendations/Interventions:  1. No recommendations at this time    Leda Tyson, [facility]  October 28, 2018

## 2018-10-28 NOTE — PLAN OF CARE
"Problem: Patient Care Overview  Goal: Plan of Care/Patient Progress Review  Outcome: No Change  Pt alert but disoriented and mostly aphasia.  at bedside until after dinner. Pt with huge soft BM after dinner. Pt incontinent. Pt turned and repositioned q2hrs. Pt swallows pills with no problems. Needs assistance with feeding. IV infusing NS 75ml/hr. Pt on room air, LS clear, coarse in bases. Pt on telemetry. Pt with a-fib, HR 130s-140s most of shift. One time dose of PO Lopressor given at 1630, with no change in HR. One time dose of Lopressor IV 5mg given at 1940 with no change, HR still sustaining at 140. Prn Dilaudid pill administered at 2140, pt answering \"yes\" to the question of pain. Pt gesturing to her abdomen and saying \"die, die!\".  Scheduled Lopressor 50mg PO pill administered at 2008, still no change in HR as of 2130. Received one time dose of Diltiazem IV 10mg at 2130 with HR of 140s as well as 1mg IV Dilaudid as pt still restless, flushed, pulling at linens and wrist band and sating \"die, die\". Pt's HR finally down to 60s a-fib at 2200. Pt sleeping and appears comfortable. BP still maintaining at 138/65 at 2200. Skin intact. Blanchable redness to heels. Pillows place under lower legs to keep heels off of bed. /65  Pulse 118  Temp 99.2  F (37.3  C) (Tympanic)  Resp 16  Ht 1.702 m (5' 7\")  Wt 78.5 kg (173 lb 1 oz)  SpO2 93%  BMI 27.11 kg/m2.     Face to face report given with opportunity to observe patient.    Report given to Varsha Chilel   10/27/2018  11:20 PM      "

## 2018-10-29 ENCOUNTER — HOSPITAL ENCOUNTER (INPATIENT)
Dept: CARDIOLOGY | Facility: HOSPITAL | Age: 78
DRG: 193 | End: 2018-10-29
Attending: INTERNAL MEDICINE
Payer: MEDICARE

## 2018-10-29 LAB
ALBUMIN SERPL-MCNC: 2.7 G/DL (ref 3.4–5)
ALP SERPL-CCNC: 114 U/L (ref 40–150)
ALT SERPL W P-5'-P-CCNC: 167 U/L (ref 0–50)
ANION GAP SERPL CALCULATED.3IONS-SCNC: 6 MMOL/L (ref 3–14)
AST SERPL W P-5'-P-CCNC: 134 U/L (ref 0–45)
BACTERIA SPEC CULT: ABNORMAL
BASOPHILS # BLD AUTO: 0 10E9/L (ref 0–0.2)
BASOPHILS NFR BLD AUTO: 0.2 %
BILIRUB SERPL-MCNC: 0.4 MG/DL (ref 0.2–1.3)
BUN SERPL-MCNC: 9 MG/DL (ref 7–30)
CALCIUM SERPL-MCNC: 7.3 MG/DL (ref 8.5–10.1)
CHLORIDE SERPL-SCNC: 112 MMOL/L (ref 94–109)
CO2 SERPL-SCNC: 25 MMOL/L (ref 20–32)
CREAT SERPL-MCNC: 0.55 MG/DL (ref 0.52–1.04)
DIFFERENTIAL METHOD BLD: ABNORMAL
EOSINOPHIL # BLD AUTO: 0.1 10E9/L (ref 0–0.7)
EOSINOPHIL NFR BLD AUTO: 1.2 %
ERYTHROCYTE [DISTWIDTH] IN BLOOD BY AUTOMATED COUNT: 14.9 % (ref 10–15)
GFR SERPL CREATININE-BSD FRML MDRD: >90 ML/MIN/1.7M2
GLUCOSE SERPL-MCNC: 98 MG/DL (ref 70–99)
HCT VFR BLD AUTO: 34.5 % (ref 35–47)
HGB BLD-MCNC: 11.4 G/DL (ref 11.7–15.7)
IMM GRANULOCYTES # BLD: 0 10E9/L (ref 0–0.4)
IMM GRANULOCYTES NFR BLD: 0.3 %
INR PPP: 3.4 (ref 0.8–1.2)
LYMPHOCYTES # BLD AUTO: 0.9 10E9/L (ref 0.8–5.3)
LYMPHOCYTES NFR BLD AUTO: 15.8 %
MCH RBC QN AUTO: 29.1 PG (ref 26.5–33)
MCHC RBC AUTO-ENTMCNC: 33 G/DL (ref 31.5–36.5)
MCV RBC AUTO: 88 FL (ref 78–100)
MONOCYTES # BLD AUTO: 0.4 10E9/L (ref 0–1.3)
MONOCYTES NFR BLD AUTO: 6.5 %
NEUTROPHILS # BLD AUTO: 4.5 10E9/L (ref 1.6–8.3)
NEUTROPHILS NFR BLD AUTO: 76 %
NRBC # BLD AUTO: 0 10*3/UL
NRBC BLD AUTO-RTO: 0 /100
PLATELET # BLD AUTO: 123 10E9/L (ref 150–450)
POTASSIUM SERPL-SCNC: 3.5 MMOL/L (ref 3.4–5.3)
PROCALCITONIN SERPL-MCNC: <0.05 NG/ML
PROT SERPL-MCNC: 6.2 G/DL (ref 6.8–8.8)
RBC # BLD AUTO: 3.92 10E12/L (ref 3.8–5.2)
SODIUM SERPL-SCNC: 143 MMOL/L (ref 133–144)
SPECIMEN SOURCE: ABNORMAL
TSH SERPL DL<=0.005 MIU/L-ACNC: 0.93 MU/L (ref 0.4–4)
WBC # BLD AUTO: 6 10E9/L (ref 4–11)

## 2018-10-29 PROCEDURE — 99232 SBSQ HOSP IP/OBS MODERATE 35: CPT | Performed by: INTERNAL MEDICINE

## 2018-10-29 PROCEDURE — 93306 TTE W/DOPPLER COMPLETE: CPT | Mod: TC

## 2018-10-29 PROCEDURE — 25000125 ZZHC RX 250: Performed by: HOSPITALIST

## 2018-10-29 PROCEDURE — 25000128 H RX IP 250 OP 636: Performed by: INTERNAL MEDICINE

## 2018-10-29 PROCEDURE — 12000000 ZZH R&B MED SURG/OB

## 2018-10-29 PROCEDURE — 80053 COMPREHEN METABOLIC PANEL: CPT | Performed by: INTERNAL MEDICINE

## 2018-10-29 PROCEDURE — 85025 COMPLETE CBC W/AUTO DIFF WBC: CPT | Performed by: INTERNAL MEDICINE

## 2018-10-29 PROCEDURE — 93306 TTE W/DOPPLER COMPLETE: CPT | Mod: 26 | Performed by: INTERNAL MEDICINE

## 2018-10-29 PROCEDURE — 36415 COLL VENOUS BLD VENIPUNCTURE: CPT | Performed by: INTERNAL MEDICINE

## 2018-10-29 PROCEDURE — 84443 ASSAY THYROID STIM HORMONE: CPT | Performed by: INTERNAL MEDICINE

## 2018-10-29 PROCEDURE — 40000275 ZZH STATISTIC RCP TIME EA 10 MIN

## 2018-10-29 PROCEDURE — 85610 PROTHROMBIN TIME: CPT | Performed by: INTERNAL MEDICINE

## 2018-10-29 PROCEDURE — 25000125 ZZHC RX 250: Performed by: INTERNAL MEDICINE

## 2018-10-29 PROCEDURE — A9270 NON-COVERED ITEM OR SERVICE: HCPCS | Mod: GY | Performed by: INTERNAL MEDICINE

## 2018-10-29 PROCEDURE — 84145 PROCALCITONIN (PCT): CPT | Performed by: INTERNAL MEDICINE

## 2018-10-29 PROCEDURE — 25000132 ZZH RX MED GY IP 250 OP 250 PS 637: Mod: GY | Performed by: INTERNAL MEDICINE

## 2018-10-29 RX ORDER — METOPROLOL TARTRATE 1 MG/ML
5 INJECTION, SOLUTION INTRAVENOUS EVERY 5 MIN PRN
Status: COMPLETED | OUTPATIENT
Start: 2018-10-29 | End: 2018-10-30

## 2018-10-29 RX ORDER — DILTIAZEM HYDROCHLORIDE 30 MG/1
30 TABLET, FILM COATED ORAL EVERY 6 HOURS SCHEDULED
Status: DISCONTINUED | OUTPATIENT
Start: 2018-10-29 | End: 2018-10-30

## 2018-10-29 RX ORDER — WARFARIN SODIUM 1 MG/1
1 TABLET ORAL
Status: COMPLETED | OUTPATIENT
Start: 2018-10-29 | End: 2018-10-29

## 2018-10-29 RX ADMIN — SENNOSIDES AND DOCUSATE SODIUM 1 TABLET: 8.6; 5 TABLET ORAL at 19:40

## 2018-10-29 RX ADMIN — ATORVASTATIN CALCIUM 40 MG: 40 TABLET, FILM COATED ORAL at 18:11

## 2018-10-29 RX ADMIN — DILTIAZEM HYDROCHLORIDE 30 MG: 30 TABLET, FILM COATED ORAL at 13:51

## 2018-10-29 RX ADMIN — ACETAMINOPHEN 650 MG: 325 TABLET, FILM COATED ORAL at 15:32

## 2018-10-29 RX ADMIN — SERTRALINE HYDROCHLORIDE 75 MG: 50 TABLET ORAL at 08:43

## 2018-10-29 RX ADMIN — RISPERIDONE 0.25 MG: 0.25 TABLET ORAL at 19:40

## 2018-10-29 RX ADMIN — METOPROLOL TARTRATE 5 MG: 1 INJECTION, SOLUTION INTRAVENOUS at 23:28

## 2018-10-29 RX ADMIN — DILTIAZEM HYDROCHLORIDE 30 MG: 30 TABLET, FILM COATED ORAL at 02:31

## 2018-10-29 RX ADMIN — LEVETIRACETAM 750 MG: 250 TABLET, FILM COATED ORAL at 08:43

## 2018-10-29 RX ADMIN — RISPERIDONE 0.25 MG: 0.25 TABLET ORAL at 08:43

## 2018-10-29 RX ADMIN — VITAMIN D, TAB 1000IU (100/BT) 2000 UNITS: 25 TAB at 08:43

## 2018-10-29 RX ADMIN — PREDNISONE 5 MG: 5 TABLET ORAL at 08:43

## 2018-10-29 RX ADMIN — DILTIAZEM HYDROCHLORIDE 30 MG: 30 TABLET, FILM COATED ORAL at 23:50

## 2018-10-29 RX ADMIN — WARFARIN SODIUM 1 MG: 1 TABLET ORAL at 18:11

## 2018-10-29 RX ADMIN — HYDROMORPHONE HYDROCHLORIDE 2 MG: 2 TABLET ORAL at 02:34

## 2018-10-29 RX ADMIN — DILTIAZEM HYDROCHLORIDE 30 MG: 30 TABLET, FILM COATED ORAL at 18:11

## 2018-10-29 RX ADMIN — SENNOSIDES AND DOCUSATE SODIUM 1 TABLET: 8.6; 5 TABLET ORAL at 08:43

## 2018-10-29 RX ADMIN — AZITHROMYCIN MONOHYDRATE 250 MG: 500 INJECTION, POWDER, LYOPHILIZED, FOR SOLUTION INTRAVENOUS at 11:41

## 2018-10-29 RX ADMIN — CEFTRIAXONE SODIUM 2 G: 2 INJECTION, SOLUTION INTRAVENOUS at 10:45

## 2018-10-29 RX ADMIN — PSYLLIUM HUSK 1 PACKET: 3.4 POWDER ORAL at 08:45

## 2018-10-29 RX ADMIN — SODIUM CHLORIDE, PRESERVATIVE FREE: 5 INJECTION INTRAVENOUS at 20:42

## 2018-10-29 RX ADMIN — LEVETIRACETAM 750 MG: 250 TABLET, FILM COATED ORAL at 19:40

## 2018-10-29 ASSESSMENT — ACTIVITIES OF DAILY LIVING (ADL)
ADLS_ACUITY_SCORE: 30
ADLS_ACUITY_SCORE: 32
ADLS_ACUITY_SCORE: 30

## 2018-10-29 NOTE — PLAN OF CARE
"Problem: Patient Care Overview  Goal: Plan of Care/Patient Progress Review  Outcome: No Change  Pt is alert and disoriented x3.  Aphasia noted though pt is able to answer yes or no questions. Pt has very irregular breathing pattern with audible wheezing along with apnea. Pt is requiring 3 LPM via NC.  Upon initial assessment pt HR-96, irregular and pt was anxious/restless, pulled out IV. When asked if pt had pain pt did state \"I hurt, I hurt\". Pt was medicated with po dilaudid which had effective results as pt was able to sleep comfortably following administration. Pt is incontinent of urine and stool. Buttocks is reddened, barrier cream applied. Turn/repo q2h. Pt takes pills crush with exception of cardizem in applesauce. Pt does not use call light, bed alarms is audible and activated.    10/29/18 0510   OTHER   Plan Of Care Reviewed With patient   Plan of Care Review   Progress no change       Problem: Pneumonia (Adult)  Goal: Signs and Symptoms of Listed Potential Problems Will be Absent, Minimized or Managed (Pneumonia)  Signs and symptoms of listed potential problems will be absent, minimized or managed by discharge/transition of care (reference Pneumonia (Adult) CPG).   Outcome: No Change   10/29/18 0510   Pneumonia   Problems Assessed (Pneumonia) all   Problems Present (Pneumonia) respiratory compromise       Face to face report given with opportunity to observe patient.    Report given to Emily Olvera   10/29/2018  7:52 AM    "

## 2018-10-29 NOTE — PHARMACY-ANTICOAGULATION SERVICE
Spoke with patient's PCP, Dr. Taylor, to verify Coumadin home regimen (2.5 mg T, Th; 5 mg AOD). He said she is usually therapeutic on this dose. Patient's INR is 3.40 today, likely affected by addition of Zithromax and Rocephin. Per the [facility] Anticoagulation Guideline, patient's warfarin dose should be decreased by 25-75%. Will order warfarin 1 mg dose today and continue to monitor.     Talia Hanson, PharmD  October 29, 2018

## 2018-10-29 NOTE — PROGRESS NOTES
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 Was sent here from SNF for notable dyspnea. Evaluation in the ED revealed patient was in atrial fibrillation with rapid ventricular rate and CXR concerning for possible right lower lobe pneumonia.     1. Atrial fibrillation with rapid ventricular rate:  Had some difficulties with HR up to the 120-130s overnight while on oral metoprolol 50 mg PO BID. Nursing also reports an earlier episode yesterday evening of HR briefly to the 30s. Diltiazem  mg added overnight. Initially did control HR to the 90-100s, but then rate increased again to 140s  - Discontinue metoprolol. Continue with Diltiazem XR for now and add short acting diltiazem 30 mg PO QID, attempting to see if we can rate control with just one medication. HR improved to 60-80s, but did hold second dose of oral diltiazem for HR once again briefly down into the 30-40s around noon hour  - Continue with telemetry monitoring  - Continue on warfarin, INR 2.37  - Obtain echocardiogram on Monday     2. Acute hypoxic respiratory failure secondary to right lower lobe pneumonia:  Patient was more agitated and restless overnight and increasingly more dyspneic. Repeat CXR today shows interval development of definite right lung opacity consistent with pneumonia.   -Change to inpatient status  -Continue with Ceftriaxone and Azithromycin empirically, treat as community acquired. No significant MDR/MRSA risk factors  -Continue O2 support via nasal canula and wean as able  -Continue modest IVF  -Some audible wheezing. Think this is more upper airway, but will add PRN duonebs     3. CVA with right hemiparesis  She does have some speech and language deficit. Review of the medical record  also indicates hx of dementia. Upon discussion with patient's daughter, she indicates that patient had no cognitive deficits prior to her stroke 3 years ago and that primarily her issue is more of a language barrier, making discernment of possible underlying dementia difficult.      4. Hypertension:  Reasonably controlled. Is not on oral antihypertensive PTA.   -Continue to monitor BP, now on oral diltiazem for rate control     5. Hx seizure  Continue with her Keppra     6. Anxiety:  Continue her Risperdone and Zoloft     DVT Prophylaxis: Warfarin  Code Status: DNR    Disposition: Expected discharge in 3-4 days once respiratory status improves and able to establish rate control  Sekou Griffith    Interval History   Patient lying in bed sleeping. Awakes easily. Difficulty providing reliable answers to my questions. Denies pain. Visibly appears more dyspneic. Some audible expiratory wheezing    -Data reviewed today: I reviewed all new labs and imaging results over the last 24 hours. I personally reviewed the chest x-ray image(s) showing interval increase in right lung opacity.    Physical Exam   Temp: 98.6  F (37  C) Temp src: Oral BP: 147/64 Pulse: (!) 136 Heart Rate: 78 Resp: 12 SpO2: 94 % O2 Device: Nasal cannula Oxygen Delivery: 3 LPM  Vitals:    10/27/18 1209   Weight: 78.5 kg (173 lb 1 oz)     Vital Signs with Ranges  Temp:  [97.2  F (36.2  C)-98.7  F (37.1  C)] 98.6  F (37  C)  Pulse:  [136] 136  Heart Rate:  [] 78  Resp:  [12] 12  BP: (138-154)/() 147/64  SpO2:  [88 %-96 %] 94 %  I/O last 3 completed shifts:  In: 3019 [P.O.:640; I.V.:5211]  Out: -     Peripheral IV 10/27/18 Left (Active)   Site Assessment WDL 10/28/2018  9:42 PM   Line Status Infusing;Checked every 1-2 hour 10/28/2018  9:42 PM   Phlebitis Scale 0-->no symptoms 10/28/2018  4:00 PM   Infiltration Scale 0 10/28/2018  4:00 PM   Number of days:1     Line/device assessment(s) completed for medical necessity    Constitutional: Alert. No  distress    HEENT: Normocephalic/atraumatic, PERRL, EOMI, mouth clear, neck supple, no LN.     Cardiovascular: Irregular. No murmur, no  rubs, or gallops.      Respiratory: Decreased air exchange overall. No expiratory wheezing. Some bibasilar crackles.     Abdomen: Soft, nontender, nondistended, no organomegaly. Bowel sounds present    Extremities: Warm/dry. 1+ edema    Neuro:  Non focal, cranial nerves intact, Moves all extremities.  Medications     sodium chloride 50 mL/hr at 10/28/18 2139     Warfarin Therapy Reminder         atorvastatin (LIPITOR) tablet 40 mg  40 mg Oral Daily with supper     azithromycin  250 mg Intravenous Q24H     cefTRIAXone  2 g Intravenous Q24H     cholecalciferol  2,000 Units Oral Daily     [START ON 10/29/2018] diltiazem  120 mg Oral Daily     diltiazem  30 mg Oral Q6H VALERIE     influenza Vac Split High-Dose  0.5 mL Intramuscular Prior to discharge     levETIRAcetam  750 mg Oral BID     predniSONE (DELTASONE) tablet 5 mg  5 mg Oral Daily with breakfast     psyllium  1 packet Oral Daily     risperiDONE (risperDAL) tablet 0.25 mg  0.25 mg Oral BID     senna-docusate  1 tablet Oral BID     sertraline (ZOLOFT) tablet 75 mg  75 mg Oral Daily     sodium chloride (PF)  3 mL Intracatheter Q8H       Data     Recent Labs  Lab 10/28/18  0601 10/27/18  0744   WBC 7.0 6.0   HGB 12.6 13.9   MCV 88 88    202   INR 2.73* 2.37*    143   POTASSIUM 3.9 4.0   CHLORIDE 112* 108   CO2 23 30   BUN 13 11   CR 0.64 0.70   ANIONGAP 8 5   SILVIO 7.6* 8.0*   * 88   ALBUMIN  --  3.2*   PROTTOTAL  --  7.5   BILITOTAL  --  0.4   ALKPHOS  --  91   ALT  --  24   AST  --  17   TROPI  --  <0.015       Recent Results (from the past 24 hour(s))   XR Chest Port 1 View    Narrative    PROCEDURE:  XR CHEST PORT 1 VW    HISTORY:  dyspnea; .     COMPARISON:  October 27, 2018    FINDINGS:   Heart is borderline in size. The pulmonary vasculature is normal.   There is persistent left lung opacity which is stable  from previous  examination. Some new right lung opacity is noted suspicious for  pneumonia. There is possible blunting of left costophrenic angle      Impression    IMPRESSION:  Worsening right lung opacity consistent with pneumonia      ENDER SPARKS MD

## 2018-10-29 NOTE — PLAN OF CARE
Problem: Pneumonia (Adult)  Goal: Signs and Symptoms of Listed Potential Problems Will be Absent, Minimized or Managed (Pneumonia)  Signs and symptoms of listed potential problems will be absent, minimized or managed by discharge/transition of care (reference Pneumonia (Adult) CPG).   Outcome: No Change  No coughing or sputum raised for collection.  Up in chair for supper, remainder of shift turned side to side.  Refused most of the food offered.  Will try mechanical soft.      Face to face report given with opportunity to observe patient.    Report given to Varsha MILLER   10/28/2018  11:32 PM

## 2018-10-29 NOTE — PROGRESS NOTES
[facility]    Hospitalist Progress Note      Assessment & Plan   Danette Ramos is an 78 year old female with history of hypertension, 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 Was sent here from SNF for notable dyspnea. Evaluation in the ED revealed patient was in atrial fibrillation with rapid ventricular rate and CXR concerning for possible right lower lobe pneumonia.     1. Atrial fibrillation with rapid ventricular rate:  Heart rates during her stay have been variable, ranging 60-160s at times. A couple brief episodes where she dropped her rate into the 30-40s. Started out on metoprolol. Diltiazem XR added after her rates were increasing again. Tried to convert her over to oral dilt only. Has maintained on the Diltiazem  mg daily. Had added short acting diltiazem, with intermittently holding due to lower heart rates. Have now changed over to short acting diltiazem only to allow for easier titration. HR currently 70-90s.  - Continue with telemetry monitoring  - Continue on warfarin, INR 3.4. Appreciate pharmacy assistance  - Echocardiogram shows reduced LV function with EF 35-40%, moderate diffuse hypokinesis, normal RV size, moderate tricuspid insufficiency, RVSP 31 + RA. She does have some 1 + pedal edema, but otherwise does not show any indication of decompensated heart failure. Could consider ACEI/beta blocker, but may not offer meaningful benefit given patients underlying poor functional status at baseline. Although beta blocker could be considered rather than diltiazem, she seems to have responded a little better to the diltiazem in terms of rate control.     2. Acute hypoxic respiratory failure secondary to right lower lobe pneumonia:  Patient less restless today. Still appears mildly dyspneic, but less wheezing. Repeat CXR 10/28 today showed interval development of definite right lung  opacity consistent with pneumonia.   -Continue with Ceftriaxone and Azithromycin empirically, treat as community acquired. No significant MDR/MRSA risk factors  -Continue O2 support via nasal canula and wean as able  -Continue modest IVF  -PRN duonebs     3. CVA with right hemiparesis  She does have some speech and language deficit. Review of the medical record also indicates hx of dementia. Upon discussion with patient's daughter, she indicates that patient had no cognitive deficits prior to her stroke 3 years ago and that primarily her issue is more of a language barrier, making discernment of possible underlying dementia difficult.      4. Hypertension:  Reasonably controlled. Is not on oral antihypertensive PTA.   -Continue to monitor BP, now on oral diltiazem for rate control     5. Hx seizure  Continue with her Keppra     6. Anxiety:  Continue her Risperdone and Zoloft     DVT Prophylaxis: Warfarin  Code Status: DNR    Disposition: Expected discharge in 2-3 days once respiratory status improves and able to establish rate control  Sekou Griffith    Interval History   Patient lying in bed sleeping. Awakes only briefly. A bit more lethargic this morning. Appears slightly dyspneic     -Data reviewed today: I reviewed all new labs and imaging results over the last 24 hours. I personally reviewed no images today  Physical Exam   Temp: 99.1  F (37.3  C) Temp src: Temporal BP: 154/77  Heart Rate: 84 Resp: 14 SpO2: 94 % O2 Device: None (Room air) Oxygen Delivery: 2 LPM (weaning to 1 lpm )  Vitals:    10/27/18 1209   Weight: 78.5 kg (173 lb 1 oz)     Vital Signs with Ranges  Temp:  [97.9  F (36.6  C)-99.1  F (37.3  C)] 99.1  F (37.3  C)  Heart Rate:  [73-96] 84  Resp:  [12-16] 14  BP: (143-169)/(64-88) 154/77  SpO2:  [92 %-99 %] 94 %  I/O last 3 completed shifts:  In: 2360 [P.O.:920; I.V.:1440]  Out: -     Peripheral IV 10/29/18 Left Hand (Active)   Site Assessment WDL 10/29/2018 10:00 AM   Line Status Saline locked  10/29/2018 10:00 AM   Phlebitis Scale 0-->no symptoms 10/29/2018 10:00 AM   Infiltration Scale 0 10/29/2018 10:00 AM   Number of days:0     Line/device assessment(s) completed for medical necessity    Constitutional: Alert. No distress    HEENT: Normocephalic/atraumatic, PERRL, EOMI, mouth clear, neck supple, no LN.     Cardiovascular: Irregular. No murmur, no  rubs, or gallops.      Respiratory: Decreased air exchange overall. No expiratory wheezing.     Abdomen: Soft, nontender, nondistended, no organomegaly. Bowel sounds present    Extremities: Warm/dry. 1+ edema    Neuro:  Non focal, cranial nerves intact, Moves all extremities.  Medications     sodium chloride 50 mL/hr at 10/28/18 2139     Warfarin Therapy Reminder         atorvastatin (LIPITOR) tablet 40 mg  40 mg Oral Daily with supper     azithromycin  250 mg Intravenous Q24H     cefTRIAXone  2 g Intravenous Q24H     cholecalciferol  2,000 Units Oral Daily     diltiazem  120 mg Oral Daily     diltiazem  30 mg Oral Q6H VALERIE     influenza Vac Split High-Dose  0.5 mL Intramuscular Prior to discharge     levETIRAcetam  750 mg Oral BID     predniSONE (DELTASONE) tablet 5 mg  5 mg Oral Daily with breakfast     psyllium  1 packet Oral Daily     risperiDONE (risperDAL) tablet 0.25 mg  0.25 mg Oral BID     senna-docusate  1 tablet Oral BID     sertraline (ZOLOFT) tablet 75 mg  75 mg Oral Daily     sodium chloride (PF)  3 mL Intracatheter Q8H     warfarin  1 mg Oral ONCE at 18:00       Data     Recent Labs  Lab 10/29/18  0512 10/28/18  0601 10/27/18  0744   WBC 6.0 7.0 6.0   HGB 11.4* 12.6 13.9   MCV 88 88 88   * 154 202   INR 3.40* 2.73* 2.37*    143 143   POTASSIUM 3.5 3.9 4.0   CHLORIDE 112* 112* 108   CO2 25 23 30   BUN 9 13 11   CR 0.55 0.64 0.70   ANIONGAP 6 8 5   SILVIO 7.3* 7.6* 8.0*   GLC 98 125* 88   ALBUMIN 2.7*  --  3.2*   PROTTOTAL 6.2*  --  7.5   BILITOTAL 0.4  --  0.4   ALKPHOS 114  --  91   *  --  24   *  --  17   TROPI  --   --   <0.015       No results found for this or any previous visit (from the past 24 hour(s)).

## 2018-10-29 NOTE — PLAN OF CARE
"Problem: Pneumonia (Adult)  Goal: Signs and Symptoms of Listed Potential Problems Will be Absent, Minimized or Managed (Pneumonia)  Signs and symptoms of listed potential problems will be absent, minimized or managed by discharge/transition of care (reference Pneumonia (Adult) CPG).   Outcome: No Change  /77  Pulse (!) 136  Temp 99.1  F (37.3  C) (Temporal)  Resp 14  Ht 1.702 m (5' 7\")  Wt 78.5 kg (173 lb 1 oz)  SpO2 94%  BMI 27.11 kg/m2    Pt lethargic and oriented x3. Hard to assess cognition r/t aphagia. Lungs are dim, sating mid 90s on RA. NO cough noted. Afebrile. Held AM dose of Cardizem per MD order.  When stable HR 80s. With activity HR jumped to 160s and sustained for about 15 minutes. MD aware and new order of  Cardizem given, HR returns to 80s. Poor appetite, total feed. Up in chair for meals. EZ stand for transfers. INC of urine.  and daughter updated. No falls or injuries this shift. Will continue to monitor.     Face to face report given with opportunity to observe patient.    Report given to MIREILLE Kapadia   10/29/2018  3:22 PM      "

## 2018-10-29 NOTE — PHARMACY
[facility]    Pharmacy      Antimicrobial Stewardship Note     Current antimicrobial therapy:  Anti-infectives (Future)    Start     Dose/Rate Route Frequency Ordered Stop    10/28/18 1130  azithromycin (ZITHROMAX) 250 mg in sodium chloride 0.9 % 250 mL intermittent infusion      250 mg  250 mL/hr over 1 Hours Intravenous EVERY 24 HOURS 10/27/18 1608 11/01/18 1129    10/28/18 1100  cefTRIAXone IN D5W (ROCEPHIN) intermittent infusion 2 g      2 g  100 mL/hr over 30 Minutes Intravenous EVERY 24 HOURS 10/27/18 1608            Indication: CAP    Days of Therapy: 3     Pertinent labs:  Creatinine   Creatinine   Date Value Ref Range Status   10/29/2018 0.55 0.52 - 1.04 mg/dL Final   10/28/2018 0.64 0.52 - 1.04 mg/dL Final   10/27/2018 0.70 0.52 - 1.04 mg/dL Final     WBC   WBC   Date Value Ref Range Status   10/29/2018 6.0 4.0 - 11.0 10e9/L Final   10/28/2018 7.0 4.0 - 11.0 10e9/L Final   10/27/2018 6.0 4.0 - 11.0 10e9/L Final     Procalcitonin   Procalcitonin   Date Value Ref Range Status   10/29/2018 <0.05 ng/ml Final     Comment:     <0.05 ng/ml  Normal  Recommendation: Very low risk of bacterial infection.   Discourage antibiotics unless strong clinical suspicion for serious infection.     10/28/2018 <0.05 ng/ml Final     Comment:     <0.05 ng/ml  Normal  Recommendation: Very low risk of bacterial infection.   Discourage antibiotics unless strong clinical suspicion for serious infection.       CRP   CRP Inflammation   Date Value Ref Range Status   10/27/2018 3.3 0.0 - 8.0 mg/L Final   02/22/2018 41.6 (H) 0.0 - 8.0 mg/L Final       Culture Results:    Influenza A and B and RSV PCR [[id number]] Collected: 10/28/18 0652     Order Status: Completed Lab Status: Final result Updated: 10/28/18 0734     Specimen: Nares from Nasopharyngeal       Specimen Description Nares      Influenza A PCR Negative       Flu A target RNA not detected, presumed negative for Influenza A or the viral   load is below the limit of  detection.            Influenza B PCR Negative       Flu B target RNA not detected, presumed negative for Influenza B or the viral   load is below the limit of detection.            Resp Syncytial Virus Negative       RSV target RNA not detected, presumed negative for Respiratory Syncitial Virus    or the viral load is below the limit of detection.   FDA approved assay performed using [employer] GeneXpert(R) real-time PCR.           Sputum Culture Aerobic Bacterial      Order Status: No result Lab Status: No result      Specimen: Sputum      Gram stain      Order Status: No result Lab Status: No result      Specimen: Sputum      Active MRSA Surveillance Culture [[id number]] Collected: 10/27/18 1520     Order Status: Completed Lab Status: Final result Updated: 10/28/18 1059     Specimen: Nares from Nose       Specimen Description Nares      Culture Micro No MRSA isolated     Blood culture [[id number]] Collected: 10/27/18 1034     Order Status: Completed Lab Status: Preliminary result Updated: 10/28/18 1256     Specimen: Blood       Specimen Description Blood      Culture Micro No growth after 1 day     Blood culture      Order Status: Canceled Lab Status: No result      Specimen: Blood      Urine Culture Aerobic Bacterial [[id number]] (Abnormal)  Collected: 10/27/18 0917     Order Status: Completed Lab Status: Final result Updated: 10/29/18 0654     Specimen: Catheterized Urine       Specimen Description Catheterized Urine      Culture Micro >100,000 colonies/mL   Proteus mirabilis    (A)     Culture & Susceptibility      PROTEUS MIRABILIS      Antibiotic Interpretation Sensitivity Unit Method Status     AMPICILLIN Sensitive <=2 ug/mL VAIBHAV Final     AMPICILLIN/SULBACTAM Sensitive <=2 ug/mL VAIBHAV Final     Amoxicillin/Clav Sensitive <=2 ug/mL VAIBHAV Final     CEFAZOLIN Sensitive <=4 ug/mL VAIBHAV Final     Comment: Cefazolin VAIBHAV breakpoints are for the treatment of uncomplicated urinary tract   infections.  For the treatment of systemic  infections, please contact the   laboratory for additional testing.     CEFEPIME Sensitive <=1 ug/mL VAIBHAV Final     CEFTAZIDIME Sensitive <=1 ug/mL VAIBHAV Final     CEFTRIAXONE Sensitive <=1 ug/mL VAIBHAV Final     CIPROFLOXACIN Sensitive <=0.25 ug/mL VAIBHAV Final     GENTAMICIN Sensitive <=1 ug/mL VAIBHAV Final     LEVOFLOXACIN Sensitive 0.25 ug/mL VAIBHAV Final     NITROFURANTOIN Resistant 128 ug/mL VAIBHAV Final     Comment: Intrinsically Resistant     Piperacillin/Tazo Sensitive <=4 ug/mL VAIBHAV Final     TOBRAMYCIN Sensitive <=1 ug/mL VAIBHAV Final     Trimethoprim/Sulfa Sensitive <=1/19 ug/mL VAIBHAV Final                           Blood culture [[id number]] Collected: 10/27/18 0745     Order Status: Completed Lab Status: Preliminary result Updated: 10/28/18 1256     Specimen: Blood       Specimen Description Blood      Culture Micro No growth after 1 day              Recommendations/Interventions:  1. No recommendations at this time.    Aston Wright RPH  October 29, 2018

## 2018-10-30 LAB
ANION GAP SERPL CALCULATED.3IONS-SCNC: 6 MMOL/L (ref 3–14)
BASOPHILS # BLD AUTO: 0 10E9/L (ref 0–0.2)
BASOPHILS NFR BLD AUTO: 0.4 %
BUN SERPL-MCNC: 4 MG/DL (ref 7–30)
CALCIUM SERPL-MCNC: 7.6 MG/DL (ref 8.5–10.1)
CHLORIDE SERPL-SCNC: 108 MMOL/L (ref 94–109)
CO2 SERPL-SCNC: 25 MMOL/L (ref 20–32)
CREAT SERPL-MCNC: 0.59 MG/DL (ref 0.52–1.04)
DIFFERENTIAL METHOD BLD: ABNORMAL
EOSINOPHIL # BLD AUTO: 0.2 10E9/L (ref 0–0.7)
EOSINOPHIL NFR BLD AUTO: 2.5 %
ERYTHROCYTE [DISTWIDTH] IN BLOOD BY AUTOMATED COUNT: 14.8 % (ref 10–15)
GFR SERPL CREATININE-BSD FRML MDRD: >90 ML/MIN/1.7M2
GLUCOSE SERPL-MCNC: 111 MG/DL (ref 70–99)
HCT VFR BLD AUTO: 35.7 % (ref 35–47)
HGB BLD-MCNC: 11.9 G/DL (ref 11.7–15.7)
IMM GRANULOCYTES # BLD: 0 10E9/L (ref 0–0.4)
IMM GRANULOCYTES NFR BLD: 0.4 %
INR PPP: 2.45 (ref 0.8–1.2)
LYMPHOCYTES # BLD AUTO: 1.8 10E9/L (ref 0.8–5.3)
LYMPHOCYTES NFR BLD AUTO: 26.3 %
MCH RBC QN AUTO: 28.6 PG (ref 26.5–33)
MCHC RBC AUTO-ENTMCNC: 33.3 G/DL (ref 31.5–36.5)
MCV RBC AUTO: 86 FL (ref 78–100)
MONOCYTES # BLD AUTO: 0.5 10E9/L (ref 0–1.3)
MONOCYTES NFR BLD AUTO: 7.8 %
NEUTROPHILS # BLD AUTO: 4.2 10E9/L (ref 1.6–8.3)
NEUTROPHILS NFR BLD AUTO: 62.6 %
NRBC # BLD AUTO: 0 10*3/UL
NRBC BLD AUTO-RTO: 0 /100
PLATELET # BLD AUTO: 144 10E9/L (ref 150–450)
POTASSIUM SERPL-SCNC: 3.1 MMOL/L (ref 3.4–5.3)
RBC # BLD AUTO: 4.16 10E12/L (ref 3.8–5.2)
SODIUM SERPL-SCNC: 139 MMOL/L (ref 133–144)
WBC # BLD AUTO: 6.8 10E9/L (ref 4–11)

## 2018-10-30 PROCEDURE — 25000125 ZZHC RX 250: Performed by: INTERNAL MEDICINE

## 2018-10-30 PROCEDURE — 25000128 H RX IP 250 OP 636: Performed by: INTERNAL MEDICINE

## 2018-10-30 PROCEDURE — 80048 BASIC METABOLIC PNL TOTAL CA: CPT | Performed by: INTERNAL MEDICINE

## 2018-10-30 PROCEDURE — A9270 NON-COVERED ITEM OR SERVICE: HCPCS | Mod: GY | Performed by: INTERNAL MEDICINE

## 2018-10-30 PROCEDURE — 25000132 ZZH RX MED GY IP 250 OP 250 PS 637: Mod: GY | Performed by: INTERNAL MEDICINE

## 2018-10-30 PROCEDURE — 40000275 ZZH STATISTIC RCP TIME EA 10 MIN

## 2018-10-30 PROCEDURE — 85610 PROTHROMBIN TIME: CPT | Performed by: INTERNAL MEDICINE

## 2018-10-30 PROCEDURE — 99232 SBSQ HOSP IP/OBS MODERATE 35: CPT | Performed by: INTERNAL MEDICINE

## 2018-10-30 PROCEDURE — 36415 COLL VENOUS BLD VENIPUNCTURE: CPT | Performed by: INTERNAL MEDICINE

## 2018-10-30 PROCEDURE — 12000000 ZZH R&B MED SURG/OB

## 2018-10-30 PROCEDURE — 85025 COMPLETE CBC W/AUTO DIFF WBC: CPT | Performed by: INTERNAL MEDICINE

## 2018-10-30 PROCEDURE — 25000125 ZZHC RX 250: Performed by: HOSPITALIST

## 2018-10-30 RX ORDER — POTASSIUM CL/LIDO/0.9 % NACL 10MEQ/0.1L
10 INTRAVENOUS SOLUTION, PIGGYBACK (ML) INTRAVENOUS
Status: DISCONTINUED | OUTPATIENT
Start: 2018-10-30 | End: 2018-10-30 | Stop reason: CLARIF

## 2018-10-30 RX ORDER — POTASSIUM CHLORIDE 1.5 G/1.58G
40 POWDER, FOR SOLUTION ORAL ONCE
Status: COMPLETED | OUTPATIENT
Start: 2018-10-30 | End: 2018-10-30

## 2018-10-30 RX ORDER — POTASSIUM CHLORIDE 1500 MG/1
20-40 TABLET, EXTENDED RELEASE ORAL
Status: DISCONTINUED | OUTPATIENT
Start: 2018-10-30 | End: 2018-10-30 | Stop reason: CLARIF

## 2018-10-30 RX ORDER — METOPROLOL TARTRATE 1 MG/ML
5 INJECTION, SOLUTION INTRAVENOUS EVERY 5 MIN PRN
Status: DISCONTINUED | OUTPATIENT
Start: 2018-10-30 | End: 2018-10-31 | Stop reason: HOSPADM

## 2018-10-30 RX ORDER — METOPROLOL TARTRATE 25 MG/1
25 TABLET, FILM COATED ORAL 2 TIMES DAILY
Status: DISCONTINUED | OUTPATIENT
Start: 2018-10-30 | End: 2018-10-31 | Stop reason: HOSPADM

## 2018-10-30 RX ORDER — POTASSIUM CHLORIDE 7.45 MG/ML
10 INJECTION INTRAVENOUS
Status: DISCONTINUED | OUTPATIENT
Start: 2018-10-30 | End: 2018-10-30 | Stop reason: CLARIF

## 2018-10-30 RX ORDER — DILTIAZEM HYDROCHLORIDE 30 MG/1
60 TABLET, FILM COATED ORAL EVERY 6 HOURS SCHEDULED
Status: DISCONTINUED | OUTPATIENT
Start: 2018-10-30 | End: 2018-10-31

## 2018-10-30 RX ORDER — POTASSIUM CHLORIDE 1.5 G/1.58G
20-40 POWDER, FOR SOLUTION ORAL
Status: DISCONTINUED | OUTPATIENT
Start: 2018-10-30 | End: 2018-10-30 | Stop reason: CLARIF

## 2018-10-30 RX ADMIN — LEVETIRACETAM 750 MG: 250 TABLET, FILM COATED ORAL at 08:58

## 2018-10-30 RX ADMIN — METOPROLOL TARTRATE 25 MG: 25 TABLET ORAL at 08:59

## 2018-10-30 RX ADMIN — ATORVASTATIN CALCIUM 40 MG: 40 TABLET, FILM COATED ORAL at 17:53

## 2018-10-30 RX ADMIN — PREDNISONE 5 MG: 5 TABLET ORAL at 08:59

## 2018-10-30 RX ADMIN — CEFTRIAXONE SODIUM 2 G: 2 INJECTION, SOLUTION INTRAVENOUS at 11:18

## 2018-10-30 RX ADMIN — DILTIAZEM HYDROCHLORIDE 60 MG: 30 TABLET, FILM COATED ORAL at 17:53

## 2018-10-30 RX ADMIN — DILTIAZEM HYDROCHLORIDE 60 MG: 30 TABLET, FILM COATED ORAL at 23:51

## 2018-10-30 RX ADMIN — DILTIAZEM HYDROCHLORIDE 120 MG: 120 CAPSULE, COATED, EXTENDED RELEASE ORAL at 08:59

## 2018-10-30 RX ADMIN — LEVETIRACETAM 750 MG: 250 TABLET, FILM COATED ORAL at 21:02

## 2018-10-30 RX ADMIN — SERTRALINE HYDROCHLORIDE 75 MG: 50 TABLET ORAL at 08:59

## 2018-10-30 RX ADMIN — METOPROLOL TARTRATE 5 MG: 5 INJECTION INTRAVENOUS at 10:00

## 2018-10-30 RX ADMIN — POTASSIUM CHLORIDE 40 MEQ: 1.5 POWDER, FOR SOLUTION ORAL at 08:58

## 2018-10-30 RX ADMIN — RISPERIDONE 0.25 MG: 0.25 TABLET ORAL at 21:02

## 2018-10-30 RX ADMIN — PSYLLIUM HUSK 1 PACKET: 3.4 POWDER ORAL at 08:58

## 2018-10-30 RX ADMIN — ACETAMINOPHEN 650 MG: 325 TABLET, FILM COATED ORAL at 03:11

## 2018-10-30 RX ADMIN — METOPROLOL TARTRATE 25 MG: 25 TABLET ORAL at 21:02

## 2018-10-30 RX ADMIN — VITAMIN D, TAB 1000IU (100/BT) 2000 UNITS: 25 TAB at 08:59

## 2018-10-30 RX ADMIN — SENNOSIDES AND DOCUSATE SODIUM 1 TABLET: 8.6; 5 TABLET ORAL at 21:02

## 2018-10-30 RX ADMIN — METOPROLOL TARTRATE 5 MG: 1 INJECTION, SOLUTION INTRAVENOUS at 03:05

## 2018-10-30 RX ADMIN — DILTIAZEM HYDROCHLORIDE 60 MG: 30 TABLET, FILM COATED ORAL at 11:18

## 2018-10-30 RX ADMIN — DILTIAZEM HYDROCHLORIDE 30 MG: 30 TABLET, FILM COATED ORAL at 05:30

## 2018-10-30 RX ADMIN — METOPROLOL TARTRATE 5 MG: 5 INJECTION INTRAVENOUS at 18:29

## 2018-10-30 RX ADMIN — ACETAMINOPHEN 650 MG: 325 TABLET, FILM COATED ORAL at 21:01

## 2018-10-30 RX ADMIN — RISPERIDONE 0.25 MG: 0.25 TABLET ORAL at 08:59

## 2018-10-30 RX ADMIN — Medication 1.5 MG: at 19:14

## 2018-10-30 RX ADMIN — AZITHROMYCIN MONOHYDRATE 250 MG: 500 INJECTION, POWDER, LYOPHILIZED, FOR SOLUTION INTRAVENOUS at 12:03

## 2018-10-30 RX ADMIN — METOPROLOL TARTRATE 5 MG: 5 INJECTION INTRAVENOUS at 08:01

## 2018-10-30 RX ADMIN — HYDROMORPHONE HYDROCHLORIDE 2 MG: 2 TABLET ORAL at 03:48

## 2018-10-30 RX ADMIN — METOPROLOL TARTRATE 5 MG: 1 INJECTION, SOLUTION INTRAVENOUS at 06:56

## 2018-10-30 RX ADMIN — SENNOSIDES AND DOCUSATE SODIUM 1 TABLET: 8.6; 5 TABLET ORAL at 08:59

## 2018-10-30 ASSESSMENT — ACTIVITIES OF DAILY LIVING (ADL)
ADLS_ACUITY_SCORE: 32

## 2018-10-30 NOTE — PROGRESS NOTES
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 Was sent here from SNF for notable dyspnea. Evaluation in the ED revealed patient was in atrial fibrillation with rapid ventricular rate and CXR concerning for possible right lower lobe pneumonia.     1. Atrial fibrillation with rapid ventricular rate:  Heart rates during her stay have been variable, ranging 60-160s at times.  Currently uncontrolled with 130-160s.  A couple brief episodes where she dropped her rate into the 30-40s. Echocardiogram shows reduced LV function with EF 35-40%, moderate diffuse hypokinesis, normal RV size, moderate tricuspid insufficiency, RVSP 31 + RA.   - she is on dilt  mg daily  - dilt short acting 30 mg QID, will continue to uptitrate as indicated  - convert to long acting as indicated  - continue metoprolol 25 mg BID  - Continue with telemetry monitoring  - Continue on warfarin, INR 2/45 today. Appreciate pharmacy assistance    2. Acute hypoxic respiratory failure secondary to right lower lobe pneumonia:  Respiratory status stable, currently not on supplemental O2.  CXR 10/28 showed interval development of definite right lung opacity consistent with pneumonia.   -Continue with Ceftriaxone and Azithromycin empirically, treat as community acquired. No significant MDR/MRSA risk factors  -Continue O2 support via nasal canula and wean as able  -Continue modest IVF  -PRN duonebs     3. CVA with right hemiparesis  She does have some speech and language deficit. Review of the medical record also indicates hx of dementia. Upon discussion with patient's daughter, she indicates that patient had no cognitive deficits prior to her stroke 3 years ago and that primarily her issue is more of a language  barrier, making discernment of possible underlying dementia difficult.      4. Hypertension:  Reasonably controlled. Is not on oral antihypertensive PTA.   -Continue to monitor BP, now on oral diltiazem for rate control     5. Hx seizure  Continue with her Keppra     6. Anxiety:  Continue her Risperdone and Zoloft    7. Acute cystitis without hematuria: urine culture with > 100,000 colonies of Proteus mirabilis, resistant to nitrofurantoin  - patient is on ceftriaxone     DVT Prophylaxis: Warfarin  Code Status: DNR    Disposition: Expected discharge in 1-2 days once able to establish rate control  Gaaml Reyes MD    Interval History   Patient lying in bed awake.  Dementia apparent, calling out of help.  Disoriented to place and time, seems to be in some distress but is able to deny pain and sob.  No supplemental O2.    -Data reviewed today: I reviewed all new labs and imaging results over the last 24 hours. I personally reviewed no images today  Physical Exam   Temp: 99.1  F (37.3  C) Temp src: Temporal BP: 152/83 Pulse: 115 Heart Rate: 83 Resp: 20 SpO2: 94 % O2 Device: None (Room air)    Vitals:    10/27/18 1209   Weight: 78.5 kg (173 lb 1 oz)     Vital Signs with Ranges  Temp:  [98.5  F (36.9  C)-100  F (37.8  C)] 99.1  F (37.3  C)  Pulse:  [] 115  Heart Rate:  [] 83  Resp:  [14-20] 20  BP: (133-182)/() 152/83  SpO2:  [91 %-96 %] 94 %  I/O last 3 completed shifts:  In: 808 [P.O.:420; I.V.:388]  Out: -     Peripheral IV 10/29/18 Right Lower forearm (Active)   Site Assessment WDL 10/29/2018 11:45 PM   Line Status Infusing 10/29/2018 11:45 PM   Phlebitis Scale 0-->no symptoms 10/29/2018 11:45 PM   Infiltration Scale 0 10/29/2018 11:45 PM   Number of days:1     Line/device assessment(s) completed for medical necessity    Constitutional: Alert, disoriented  HEENT: Normocephalic/atraumatic, PERRL, EOMI, mouth clear, neck supple, no LN  Cardiovascular: Irregular. No murmur, no  rubs, or gallops.     Respiratory: Decreased air exchange overall. No expiratory wheezing.   Abdomen: Soft, nontender, nondistended, no organomegaly. Bowel sounds present  Extremities: Warm/dry. 1+ edema  Neuro:  Non focal, cranial nerves intact, Moves all extremities.      Medications     sodium chloride 50 mL/hr at 10/29/18 2042     Warfarin Therapy Reminder         atorvastatin (LIPITOR) tablet 40 mg  40 mg Oral Daily with supper     azithromycin  250 mg Intravenous Q24H     cefTRIAXone  2 g Intravenous Q24H     cholecalciferol  2,000 Units Oral Daily     diltiazem  120 mg Oral Daily     diltiazem  30 mg Oral Q6H VALERIE     influenza Vac Split High-Dose  0.5 mL Intramuscular Prior to discharge     levETIRAcetam  750 mg Oral BID     metoprolol tartrate  25 mg Oral BID     potassium chloride  40 mEq Oral Once     predniSONE (DELTASONE) tablet 5 mg  5 mg Oral Daily with breakfast     psyllium  1 packet Oral Daily     risperiDONE (risperDAL) tablet 0.25 mg  0.25 mg Oral BID     senna-docusate  1 tablet Oral BID     sertraline (ZOLOFT) tablet 75 mg  75 mg Oral Daily     sodium chloride (PF)  3 mL Intracatheter Q8H     warfarin  1.5 mg Oral ONCE at 18:00       Data     Recent Labs  Lab 10/30/18  0529 10/29/18  0512 10/28/18  0601 10/27/18  0744   WBC 6.8 6.0 7.0 6.0   HGB 11.9 11.4* 12.6 13.9   MCV 86 88 88 88   * 123* 154 202   INR 2.45* 3.40* 2.73* 2.37*    143 143 143   POTASSIUM 3.1* 3.5 3.9 4.0   CHLORIDE 108 112* 112* 108   CO2 25 25 23 30   BUN 4* 9 13 11   CR 0.59 0.55 0.64 0.70   ANIONGAP 6 6 8 5   SILVIO 7.6* 7.3* 7.6* 8.0*   * 98 125* 88   ALBUMIN  --  2.7*  --  3.2*   PROTTOTAL  --  6.2*  --  7.5   BILITOTAL  --  0.4  --  0.4   ALKPHOS  --  114  --  91   ALT  --  167*  --  24   AST  --  134*  --  17   TROPI  --   --   --  <0.015       No results found for this or any previous visit (from the past 24 hour(s)).

## 2018-10-30 NOTE — PLAN OF CARE
Problem: Patient Care Overview  Goal: Plan of Care/Patient Progress Review  Outcome: Improving  More alert this evening, but indicates is uncomfortable.  Given Tylenol for headache.  Also seemed to have belly discomfort and B.S. Hyperactive.  Staff transferred her to the commode with EZ lift.  Did pass some flatus, but no BM.  After transferring to the chair heart rate increased to the 150'-170's.  Rate came down after rest.   here, assisted her with supper.  Not much appetite.  Heart rate climbed again when transferred back bed, then decreased at rest.  IV Metoprolol ordered during second episode, but IV needed to be restarted.  Hospitalist contacted to consult, as the rate decrease at rest.  Did not want it given at that time.  Face to face report given with opportunity to observe patient.    Report given to Lainey MILLER   10/29/2018  7:38 PM      Problem: Pneumonia (Adult)  Goal: Signs and Symptoms of Listed Potential Problems Will be Absent, Minimized or Managed (Pneumonia)  Signs and symptoms of listed potential problems will be absent, minimized or managed by discharge/transition of care (reference Pneumonia (Adult) CPG).   Outcome: Improving  Now on room air, O2 sats holding above 92%

## 2018-10-30 NOTE — PLAN OF CARE
Problem: Patient Care Overview  Goal: Plan of Care/Patient Progress Review  Outcome: No Change  Pt HR has remained labile running 80's to 160's t/o shift even though pt was medicated with prn 5mg IV metoprolol, scheduled po cardizem, and dilaudid. Max temp this shift 100, pt rec'd po tylenol with temp recheck 99.1. Turn/repo q2h. Pt is incontinent of urine. Pt restless on and off t/o shift, seems to relax when staff is present in room. IVF infusing @ 50 ml/hr. Will continue to monitor.      10/30/18 0620   OTHER   Plan Of Care Reviewed With patient   Plan of Care Review   Progress no change       Problem: Pneumonia (Adult)  Goal: Signs and Symptoms of Listed Potential Problems Will be Absent, Minimized or Managed (Pneumonia)  Signs and symptoms of listed potential problems will be absent, minimized or managed by discharge/transition of care (reference Pneumonia (Adult) CPG).   Outcome: No Change   10/30/18 0620   Pneumonia   Problems Assessed (Pneumonia) all   Problems Present (Pneumonia) none

## 2018-10-30 NOTE — PLAN OF CARE
Face to face report given with opportunity to observe patient.    Report given to Emily Olvera   10/30/2018  7:24 AM

## 2018-10-30 NOTE — PHARMACY
[facility]    Pharmacy      Antimicrobial Stewardship Note     Current antimicrobial therapy:  Anti-infectives (Future)    Start     Dose/Rate Route Frequency Ordered Stop    10/28/18 1130  azithromycin (ZITHROMAX) 250 mg in sodium chloride 0.9 % 250 mL intermittent infusion      250 mg  250 mL/hr over 1 Hours Intravenous EVERY 24 HOURS 10/27/18 1608 11/01/18 1129    10/28/18 1100  cefTRIAXone IN D5W (ROCEPHIN) intermittent infusion 2 g      2 g  100 mL/hr over 30 Minutes Intravenous EVERY 24 HOURS 10/27/18 1608            Indication: CAP    Days of Therapy: 4     Pertinent labs:  Creatinine   Creatinine   Date Value Ref Range Status   10/30/2018 0.59 0.52 - 1.04 mg/dL Final   10/29/2018 0.55 0.52 - 1.04 mg/dL Final   10/28/2018 0.64 0.52 - 1.04 mg/dL Final     WBC   WBC   Date Value Ref Range Status   10/30/2018 6.8 4.0 - 11.0 10e9/L Final   10/29/2018 6.0 4.0 - 11.0 10e9/L Final   10/28/2018 7.0 4.0 - 11.0 10e9/L Final     Procalcitonin   Procalcitonin   Date Value Ref Range Status   10/29/2018 <0.05 ng/ml Final     Comment:     <0.05 ng/ml  Normal  Recommendation: Very low risk of bacterial infection.   Discourage antibiotics unless strong clinical suspicion for serious infection.     10/28/2018 <0.05 ng/ml Final     Comment:     <0.05 ng/ml  Normal  Recommendation: Very low risk of bacterial infection.   Discourage antibiotics unless strong clinical suspicion for serious infection.       CRP   CRP Inflammation   Date Value Ref Range Status   10/27/2018 3.3 0.0 - 8.0 mg/L Final   02/22/2018 41.6 (H) 0.0 - 8.0 mg/L Final       Culture Results:    Influenza A and B and RSV PCR [[id number]] Collected: 10/28/18 0652     Order Status: Completed Lab Status: Final result Updated: 10/28/18 0734     Specimen: Nares from Nasopharyngeal       Specimen Description Nares      Influenza A PCR Negative       Flu A target RNA not detected, presumed negative for Influenza A or the viral   load is below the limit of  detection.            Influenza B PCR Negative       Flu B target RNA not detected, presumed negative for Influenza B or the viral   load is below the limit of detection.            Resp Syncytial Virus Negative       RSV target RNA not detected, presumed negative for Respiratory Syncitial Virus    or the viral load is below the limit of detection.   FDA approved assay performed using [employer] GeneXpert(R) real-time PCR.           Sputum Culture Aerobic Bacterial      Order Status: No result Lab Status: No result      Specimen: Sputum      Gram stain      Order Status: No result Lab Status: No result      Specimen: Sputum      Active MRSA Surveillance Culture [[id number]] Collected: 10/27/18 1520     Order Status: Completed Lab Status: Final result Updated: 10/28/18 1059     Specimen: Nares from Nose       Specimen Description Nares      Culture Micro No MRSA isolated     Blood culture [[id number]] Collected: 10/27/18 1034     Order Status: Completed Lab Status: Preliminary result Updated: 10/29/18 0758     Specimen: Blood       Specimen Description Blood      Culture Micro No growth after 2 days     Blood culture      Order Status: Canceled Lab Status: No result      Specimen: Blood      Urine Culture Aerobic Bacterial [[id number]] (Abnormal)  Collected: 10/27/18 0917     Order Status: Completed Lab Status: Final result Updated: 10/29/18 0654     Specimen: Catheterized Urine       Specimen Description Catheterized Urine      Culture Micro >100,000 colonies/mL   Proteus mirabilis    (A)     Culture & Susceptibility      PROTEUS MIRABILIS      Antibiotic Interpretation Sensitivity Unit Method Status     AMPICILLIN Sensitive <=2 ug/mL VAIBHAV Final     AMPICILLIN/SULBACTAM Sensitive <=2 ug/mL VAIBHAV Final     Amoxicillin/Clav Sensitive <=2 ug/mL VAIBHAV Final     CEFAZOLIN Sensitive <=4 ug/mL VAIBHAV Final     Comment: Cefazolin VAIBHAV breakpoints are for the treatment of uncomplicated urinary tract   infections.  For the treatment of systemic  infections, please contact the   laboratory for additional testing.     CEFEPIME Sensitive <=1 ug/mL VAIBHAV Final     CEFTAZIDIME Sensitive <=1 ug/mL VAIBHAV Final     CEFTRIAXONE Sensitive <=1 ug/mL VAIBHAV Final     CIPROFLOXACIN Sensitive <=0.25 ug/mL VAIBHAV Final     GENTAMICIN Sensitive <=1 ug/mL VAIBHAV Final     LEVOFLOXACIN Sensitive 0.25 ug/mL VAIBHAV Final     NITROFURANTOIN Resistant 128 ug/mL VAIBHAV Final     Comment: Intrinsically Resistant     Piperacillin/Tazo Sensitive <=4 ug/mL VAIBHAV Final     TOBRAMYCIN Sensitive <=1 ug/mL VAIBHAV Final     Trimethoprim/Sulfa Sensitive <=1/19 ug/mL VAIBHAV Final                           Blood culture [[id number]] Collected: 10/27/18 0745     Order Status: Completed Lab Status: Preliminary result Updated: 10/29/18 0758     Specimen: Blood       Specimen Description Blood      Culture Micro No growth after 2 days          Recommendations/Interventions:  1. No recommendations at this time.    Aston Wright RPH  October 30, 2018

## 2018-10-30 NOTE — PLAN OF CARE
"Per ICU nurse tele noted to increase to 130's-160's. This writer then went in to assess pt. She was restless and stated \"I hurt\". At this time HR-90's. This staff chose to administer prn po Dilaudid. HR decreased to 80's. Will continue to monitor.   "

## 2018-10-30 NOTE — PLAN OF CARE
Per ICU nurse tele noted to increase to 120's-150's while pt lying in bed. BP-162/107. Pt medicated with prn 5 mg IV metoprolol. HR now 80's-90's per ICU nurse. Will continue to monitor.

## 2018-10-30 NOTE — PLAN OF CARE
HR remains A-Fib, rate 70-90's this evening. Denies pain, offers no outward signs of pain or discomfort. Turned q2 hrs. IVF continues.     Face to face report given with opportunity to observe patient.    Report given to MIREILLE Maddox   10/30/2018  12:01 AM

## 2018-10-30 NOTE — PLAN OF CARE
HR per tele noted to increase to 130's-160's while patient was just lying in bed. Patient appeared SOB but declined feeling that way and had no other complaints. BP elevated 160's systolic. Updated primary nurse of situation. With consent of primary nurse writer chose to administer 5 mg IV Lopressor per MAR order. HR now down to 90's-120's. Patient is due for scheduled Cardizem at midnight. Will update primary on patient's status.

## 2018-10-30 NOTE — PLAN OF CARE
Per ICU nurse pt HR up to 160's. When this writer assessed pt, HR-100's-150's. Pt extremely restless with 's. Spo2 92% on RA.  This writer chose to administer 5 mg IV metoprolol per MAR orders. Will continue to monitor.

## 2018-10-30 NOTE — PLAN OF CARE
"Problem: Pneumonia (Adult)  Goal: Signs and Symptoms of Listed Potential Problems Will be Absent, Minimized or Managed (Pneumonia)  Signs and symptoms of listed potential problems will be absent, minimized or managed by discharge/transition of care (reference Pneumonia (Adult) CPG).   Outcome: No Change  /77  Pulse 115  Temp 99.1  F (37.3  C) (Temporal)  Resp 20  Ht 1.702 m (5' 7\")  Wt 78.5 kg (173 lb 1 oz)  SpO2 92%  BMI 27.11 kg/m2    Pt alert and oriented x3. Speech garbled, baseline. Lungs dim, clear. Sating low 90s on RA. BS active. HR irregular, per ICU tele report Afib 100-160s.  PRN Metoprolol IV given 2x this shiuft for HR > 110. In AM BP  180s/110s. This came down also with the IV Metoprolol, recheck 150s/80s. Pt asymptomatic. Potassium replaced this shift 1x, recheck in AM. EZ stand lift for transfers. Tolerates well. Bottom blanchable red, no open areas. INC of urine. Poor appetite, total feed. Family updated t/o shift. No falls or injuries this shift. Will continue to monitor.     Face to face report given with opportunity to observe patient.    Report given to MIREILLE Larsen   10/30/2018  3:21 PM      "

## 2018-10-31 VITALS
DIASTOLIC BLOOD PRESSURE: 64 MMHG | HEART RATE: 83 BPM | BODY MASS INDEX: 27.16 KG/M2 | OXYGEN SATURATION: 93 % | RESPIRATION RATE: 20 BRPM | WEIGHT: 173.06 LBS | SYSTOLIC BLOOD PRESSURE: 127 MMHG | HEIGHT: 67 IN | TEMPERATURE: 99.2 F

## 2018-10-31 LAB
ANION GAP SERPL CALCULATED.3IONS-SCNC: 5 MMOL/L (ref 3–14)
BASOPHILS # BLD AUTO: 0 10E9/L (ref 0–0.2)
BASOPHILS NFR BLD AUTO: 0.5 %
BUN SERPL-MCNC: 5 MG/DL (ref 7–30)
CALCIUM SERPL-MCNC: 7.7 MG/DL (ref 8.5–10.1)
CHLORIDE SERPL-SCNC: 110 MMOL/L (ref 94–109)
CO2 SERPL-SCNC: 27 MMOL/L (ref 20–32)
CREAT SERPL-MCNC: 0.48 MG/DL (ref 0.52–1.04)
DIFFERENTIAL METHOD BLD: ABNORMAL
EOSINOPHIL # BLD AUTO: 0.1 10E9/L (ref 0–0.7)
EOSINOPHIL NFR BLD AUTO: 2.1 %
ERYTHROCYTE [DISTWIDTH] IN BLOOD BY AUTOMATED COUNT: 14.8 % (ref 10–15)
GFR SERPL CREATININE-BSD FRML MDRD: >90 ML/MIN/1.7M2
GLUCOSE SERPL-MCNC: 96 MG/DL (ref 70–99)
HCT VFR BLD AUTO: 33.9 % (ref 35–47)
HGB BLD-MCNC: 11.5 G/DL (ref 11.7–15.7)
IMM GRANULOCYTES # BLD: 0 10E9/L (ref 0–0.4)
IMM GRANULOCYTES NFR BLD: 0.2 %
INR PPP: 2.14 (ref 0.8–1.2)
LYMPHOCYTES # BLD AUTO: 1.1 10E9/L (ref 0.8–5.3)
LYMPHOCYTES NFR BLD AUTO: 26.3 %
MCH RBC QN AUTO: 28.9 PG (ref 26.5–33)
MCHC RBC AUTO-ENTMCNC: 33.9 G/DL (ref 31.5–36.5)
MCV RBC AUTO: 85 FL (ref 78–100)
MONOCYTES # BLD AUTO: 0.3 10E9/L (ref 0–1.3)
MONOCYTES NFR BLD AUTO: 7.3 %
NEUTROPHILS # BLD AUTO: 2.7 10E9/L (ref 1.6–8.3)
NEUTROPHILS NFR BLD AUTO: 63.6 %
NRBC # BLD AUTO: 0 10*3/UL
NRBC BLD AUTO-RTO: 0 /100
PLATELET # BLD AUTO: 131 10E9/L (ref 150–450)
POTASSIUM SERPL-SCNC: 2.9 MMOL/L (ref 3.4–5.3)
RBC # BLD AUTO: 3.98 10E12/L (ref 3.8–5.2)
SODIUM SERPL-SCNC: 142 MMOL/L (ref 133–144)
WBC # BLD AUTO: 4.3 10E9/L (ref 4–11)

## 2018-10-31 PROCEDURE — 25000132 ZZH RX MED GY IP 250 OP 250 PS 637: Mod: GY | Performed by: INTERNAL MEDICINE

## 2018-10-31 PROCEDURE — 40000275 ZZH STATISTIC RCP TIME EA 10 MIN

## 2018-10-31 PROCEDURE — 85610 PROTHROMBIN TIME: CPT | Performed by: INTERNAL MEDICINE

## 2018-10-31 PROCEDURE — A9270 NON-COVERED ITEM OR SERVICE: HCPCS | Mod: GY | Performed by: INTERNAL MEDICINE

## 2018-10-31 PROCEDURE — 99239 HOSP IP/OBS DSCHRG MGMT >30: CPT | Performed by: INTERNAL MEDICINE

## 2018-10-31 PROCEDURE — 80048 BASIC METABOLIC PNL TOTAL CA: CPT | Performed by: INTERNAL MEDICINE

## 2018-10-31 PROCEDURE — 25000125 ZZHC RX 250: Performed by: INTERNAL MEDICINE

## 2018-10-31 PROCEDURE — 36415 COLL VENOUS BLD VENIPUNCTURE: CPT | Performed by: INTERNAL MEDICINE

## 2018-10-31 PROCEDURE — 85025 COMPLETE CBC W/AUTO DIFF WBC: CPT | Performed by: INTERNAL MEDICINE

## 2018-10-31 RX ORDER — LEVOFLOXACIN 500 MG/1
500 TABLET, FILM COATED ORAL DAILY
Qty: 7 TABLET | Refills: 0 | Status: SHIPPED | OUTPATIENT
Start: 2018-10-31 | End: 2022-11-06

## 2018-10-31 RX ORDER — METOPROLOL TARTRATE 25 MG/1
25 TABLET, FILM COATED ORAL 2 TIMES DAILY
Qty: 60 TABLET | Refills: 0 | Status: SHIPPED | OUTPATIENT
Start: 2018-10-31

## 2018-10-31 RX ORDER — HYDROMORPHONE HYDROCHLORIDE 2 MG/1
2 TABLET ORAL EVERY 4 HOURS PRN
Qty: 10 TABLET | Refills: 0 | Status: SHIPPED | OUTPATIENT
Start: 2018-10-31 | End: 2022-11-06

## 2018-10-31 RX ORDER — DILTIAZEM HCL 90 MG
90 TABLET ORAL EVERY 6 HOURS
Qty: 120 TABLET | Refills: 0 | Status: SHIPPED | OUTPATIENT
Start: 2018-10-31 | End: 2022-11-06

## 2018-10-31 RX ORDER — POTASSIUM CHLORIDE 1.5 G/1.58G
40 POWDER, FOR SOLUTION ORAL DAILY
Qty: 60 TABLET | Refills: 0 | Status: SHIPPED | OUTPATIENT
Start: 2018-10-31 | End: 2022-11-06

## 2018-10-31 RX ORDER — DILTIAZEM HYDROCHLORIDE 30 MG/1
90 TABLET, FILM COATED ORAL EVERY 6 HOURS SCHEDULED
Status: DISCONTINUED | OUTPATIENT
Start: 2018-10-31 | End: 2018-10-31 | Stop reason: HOSPADM

## 2018-10-31 RX ORDER — WARFARIN SODIUM 2.5 MG/1
2.5 TABLET ORAL
Status: DISCONTINUED | OUTPATIENT
Start: 2018-10-31 | End: 2018-10-31 | Stop reason: HOSPADM

## 2018-10-31 RX ORDER — POTASSIUM CHLORIDE 1.5 G/1.58G
40 POWDER, FOR SOLUTION ORAL 2 TIMES DAILY
Status: DISCONTINUED | OUTPATIENT
Start: 2018-10-31 | End: 2018-10-31 | Stop reason: HOSPADM

## 2018-10-31 RX ADMIN — PSYLLIUM HUSK 1 PACKET: 3.4 POWDER ORAL at 09:23

## 2018-10-31 RX ADMIN — VITAMIN D, TAB 1000IU (100/BT) 2000 UNITS: 25 TAB at 09:23

## 2018-10-31 RX ADMIN — SERTRALINE HYDROCHLORIDE 75 MG: 50 TABLET ORAL at 09:21

## 2018-10-31 RX ADMIN — RISPERIDONE 0.25 MG: 0.25 TABLET ORAL at 09:21

## 2018-10-31 RX ADMIN — SENNOSIDES AND DOCUSATE SODIUM 1 TABLET: 8.6; 5 TABLET ORAL at 09:23

## 2018-10-31 RX ADMIN — LEVETIRACETAM 750 MG: 250 TABLET, FILM COATED ORAL at 09:22

## 2018-10-31 RX ADMIN — DILTIAZEM HYDROCHLORIDE 90 MG: 30 TABLET, FILM COATED ORAL at 11:05

## 2018-10-31 RX ADMIN — POTASSIUM CHLORIDE 40 MEQ: 1.5 POWDER, FOR SOLUTION ORAL at 09:23

## 2018-10-31 RX ADMIN — PREDNISONE 5 MG: 5 TABLET ORAL at 09:22

## 2018-10-31 RX ADMIN — DILTIAZEM HYDROCHLORIDE 60 MG: 30 TABLET, FILM COATED ORAL at 05:55

## 2018-10-31 RX ADMIN — METOPROLOL TARTRATE 25 MG: 25 TABLET ORAL at 09:22

## 2018-10-31 ASSESSMENT — ACTIVITIES OF DAILY LIVING (ADL)
ADLS_ACUITY_SCORE: 34
ADLS_ACUITY_SCORE: 32
ADLS_ACUITY_SCORE: 34

## 2018-10-31 NOTE — PLAN OF CARE
Problem: Patient Care Overview  Goal: Plan of Care/Patient Progress Review  Outcome: No Change  VSS, on tele reading A.Fib 's, denies pain. LS w/fine crackles to bases. BS normoactive. Incontinent of urine and soft green stool. +1 edema to BLE. Coccyx pink/blanchable, barrier cream applied, T&R q2h. Slept much of shift. Ambulates w/EZ Stand assist x2. IV infusing NS @ 50. Call light w/in reach, alarms on in bed.     Face to face report given with opportunity to observe patient.    Report given to Digna THOMPSON RN.     Abdirizak Garcia   10/31/2018  7:48 AM

## 2018-10-31 NOTE — PLAN OF CARE
Face to face report given with opportunity to observe patient.    Report given to Chava Muir   10/30/2018  7:46 PM

## 2018-10-31 NOTE — DISCHARGE SUMMARY
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  Please see admission H+P for additional details.    Hospital Course   Danette Ramos is an 78-year-old female with history of CVA and resultant right-sided hemiparesis and dysarthria, chronic atrial fibrillation on warfarin, hypertension, dementia was admitted on 10/27/2018 for shortness of breath.  The patient was found to have a right lower lobe pneumonia on chest x-ray as well as rapid ventricular response from her atrial fibrillation.  She was placed on empiric ceftriaxone and azithromycin and her supplemental oxygen was weaned without difficulty.  Blood cultures were negative.  She was also found to have a UTI, urine culture grew out >100,000 colonies of Proteus mirabilis sp.  She was empirically switched to oral levofloxacin and she will complete a 10-day course of antibiotic treatment.  Her respiratory status is currently stable.  Her pneumonia did seem to trigger some rapid ventricular response.  She is on diltiazem extended release 120 mg daily, however this proved to be inadequate at this time.  She was started on metoprolol 25 mg twice daily, and she was found to have increased need of diltiazem as well.   She was having some trouble swallowing the whole pills of the XR formulation, therefore her diltiazem was converted completely to the immediate release tablets.  Her dose was increased to 90 mg 4 times daily for a total of 360 mg daily.  She has prescribed the immediate release tablet for the ability to crush up the medication.  She did have a repeat echocardiogram which showed an ejection fraction of 35-40%, which is essentially unchanged from previous echocardiograms.  She is currently stable for discharge back to her skilled nursing facility, she is establish DNR status.    Gamal Reyes MD    Significant Results and Procedures   See below.    Pending Results   These results will be followed up by Crow Taylor    Unresulted Labs Ordered in the Past 30 Days of this Admission     Date and Time Order Name Status Description    10/27/2018 1013 Blood culture Preliminary     10/27/2018 1011 Blood culture Preliminary           Code Status   DNR       Primary Care Physician   Crow Taylor    Physical Exam   Temp: 98.9  F (37.2  C) Temp src: Tympanic BP: 132/60 Pulse: 74 Heart Rate: 83 Resp: 15 SpO2: 93 % O2 Device: None (Room air)    Vitals:    10/27/18 1209   Weight: 78.5 kg (173 lb 1 oz)     Vital Signs with Ranges  Temp:  [98.9  F (37.2  C)-100  F (37.8  C)] 98.9  F (37.2  C)  Pulse:  [] 74  Heart Rate:  [] 83  Resp:  [15-24] 15  BP: (132-160)/(60-90) 132/60  SpO2:  [92 %-95 %] 93 %  I/O last 3 completed shifts:  In: 2732 [P.O.:600; I.V.:2132]  Out: -     Constitutional: demented, mostly unintelligible speech, mumbling  Eyes: PERRLA, no injection, no icterus  HEENT: atraumatic, normocephalic  Respiratory: CTA b/l  Cardiovascular: S1 S2 RRR  GI: soft, NT, ND, + bowel sounds  Lymph/Hematologic: no palpable lymphadenopathy  Skin: no rashes, no lesions  Musculoskeletal: No edema, good tone, no deformities  Neurologic: oriented x 0, residual R sided weakness  Psychiatric: appropriate  affect      Discharge Disposition   Discharged to nursing home  Condition at discharge: Stable    Consultations This Hospital Stay   PHARMACY TO DOSE WARFARIN  PHYSICAL THERAPY ADULT IP CONSULT  OCCUPATIONAL THERAPY ADULT IP CONSULT    Time Spent on this Encounter   IGamal MD, personally saw the patient today and spent greater than 30 minutes discharging this patient.    Discharge Orders     General info for SNF   Length of Stay Estimate: Long Term Care  Condition at Discharge: Stable  Level of care:skilled   Rehabilitation Potential: Poor  Admission H&P remains valid and up-to-date: Yes  Recent Chemotherapy: N/A  Use Nursing Home Standing Orders: Yes     Mantoux instructions   Give two-step Mantoux (PPD) Per Facility Policy Yes     Reason for your hospital stay   pneumonia     Follow Up and recommended labs and tests   Follow up with [facility] physician.  The following labs/tests are recommended: INR 2-3 days.     Activity - Up with nursing assistance     DNR (Do Not Resuscitate)     Physical Therapy Adult Consult   Evaluate and treat as clinically indicated.    Reason:  weakness     Occupational Therapy Adult Consult   Evaluate and treat as clinically indicated.    Reason:  weakness     Fall precautions     Seizure precautions     Advance Diet as Tolerated   Follow this diet upon discharge: Orders Placed This Encounter     Room Service     Regular Diet Adult       Discharge Medications   Current Discharge Medication List      START taking these medications    Details   diltiazem (CARDIZEM) 90 MG tablet Take 1 tablet (90 mg) by mouth every 6 hours  Qty: 120 tablet, Refills: 0    Associated Diagnoses: Chronic atrial fibrillation (H)      levofloxacin (LEVAQUIN) 500 MG tablet Take 1 tablet (500 mg) by mouth daily  Qty: 7 tablet, Refills: 0    Associated Diagnoses: Community acquired pneumonia, unspecified laterality      metoprolol tartrate (LOPRESSOR) 25 MG tablet Take 1 tablet (25 mg) by mouth 2 times  daily  Qty: 60 tablet, Refills: 0    Associated Diagnoses: Chronic atrial fibrillation (H)      potassium chloride (KLOR-CON) 20 MEQ Packet Take 40 mEq by mouth daily  Qty: 60 tablet, Refills: 0    Associated Diagnoses: Chronic atrial fibrillation (H)         CONTINUE these medications which have CHANGED    Details   HYDROmorphone (DILAUDID) 2 MG tablet Take 1 tablet (2 mg) by mouth every 4 hours as needed for moderate to severe pain 2 to 4 mg oral every 4 hours prn pain  Qty: 10 tablet, Refills: 0    Associated Diagnoses: Hospice care patient         CONTINUE these medications which have NOT CHANGED    Details   !! Acetaminophen (TYLENOL PO) Take 650 mg by mouth 2 times daily       !! ACETAMINOPHEN PO Take 650 mg by mouth every 6 hours as needed for pain      Atorvastatin Calcium (LIPITOR PO) Take 40 mg by mouth daily      LEVETIRACETAM PO Take 750 mg by mouth 2 times daily      PREDNISONE PO Take 5 mg by mouth daily      psyllium (KONSYL) 100 % POWD Take 1 Tablespoonful by mouth daily One tablespoon mixed with juice      RisperiDONE (RISPERDAL PO) Take 0.25 mg by mouth 2 times daily      senna-docusate (SENOKOT-S;PERICOLACE) 8.6-50 MG per tablet Take 1 tablet by mouth 2 times daily       SERTRALINE HCL PO Take 75 mg by mouth daily       Vitamin D, Cholecalciferol, 1000 units CAPS Take 2,000 Units by mouth daily  Qty: 30 capsule, Refills: 0    Associated Diagnoses: Healthcare maintenance      !! Warfarin Sodium (COUMADIN PO) Take 2.5 mg by mouth Tu, Th      !! Warfarin Sodium (COUMADIN PO) Take 5 mg by mouth M, W, F, Sa, Puga      bisacodyl (DULCOLAX) 10 MG Suppository Place 10 mg rectally every 3 days PRN      guaiFENesin-dextromethorphan (ROBITUSSIN DM) 100-10 MG/5ML syrup Take 5 mLs by mouth every 4 hours as needed for cough Take 5 to 10 mls q 4 hours as needed      magnesium hydroxide (MILK OF MAGNESIA) 400 MG/5ML suspension Take 30 mLs by mouth every 3 days PRN       !! - Potential duplicate medications found.  Please discuss with provider.        Allergies   Allergies   Allergen Reactions     Bacitracin      Data   Most Recent 3 CBC's:  Recent Labs   Lab Test  10/31/18   0522  10/30/18   0529  10/29/18   0512   WBC  4.3  6.8  6.0   HGB  11.5*  11.9  11.4*   MCV  85  86  88   PLT  131*  144*  123*      Most Recent 3 BMP's:  Recent Labs   Lab Test  10/31/18   0522  10/30/18   0529  10/29/18   0512   NA  142  139  143   POTASSIUM  2.9*  3.1*  3.5   CHLORIDE  110*  108  112*   CO2  27  25  25   BUN  5*  4*  9   CR  0.48*  0.59  0.55   ANIONGAP  5  6  6   SILVIO  7.7*  7.6*  7.3*   GLC  96  111*  98     Most Recent 2 LFT's:  Recent Labs   Lab Test  10/29/18   0512  10/27/18   0744   AST  134*  17   ALT  167*  24   ALKPHOS  114  91   BILITOTAL  0.4  0.4     Most Recent INR's and Anticoagulation Dosing History:  Anticoagulation Dose History     Recent Dosing and Labs Latest Ref Rng & Units 11/24/2017 2/22/2018 10/27/2018 10/28/2018 10/29/2018 10/30/2018 10/31/2018    Warfarin 1 mg - - - - 1 mg 1 mg - -    Warfarin 1.5 mg - - - - - - 1.5 mg -    Warfarin 2.5 mg - - - 2.5 mg - - - -    INR 0.80 - 1.20 2.49(H) 2.92(H) 2.37(H) 2.73(H) 3.40(H) 2.45(H) 2.14(H)        Most Recent 3 Troponin's:  Recent Labs   Lab Test  10/27/18   0744  02/22/18   0853  11/24/17   0658   TROPI  <0.015  <0.015  0.156*     Most Recent Cholesterol Panel:No lab results found.  Most Recent 6 Bacteria Isolates From Any Culture (See EPIC Reports for Culture Details):  Recent Labs   Lab Test  10/27/18   1520  10/27/18   1034  10/27/18   0917  10/27/18   0745  02/22/18   0937  02/22/18   0928   CULT  No MRSA isolated  No growth after 3 days  >100,000 colonies/mL  Proteus mirabilis  *  No growth after 3 days  No growth after 6 days  No growth after 6 days     Most Recent TSH, T4 and A1c Labs:  Recent Labs   Lab Test  10/29/18   0512   TSH  0.93     Results for orders placed or performed during the hospital encounter of 10/27/18   XR Chest Port 1 View    Narrative     PROCEDURE:  XR CHEST PORT 1 VW    HISTORY:  sob; .     COMPARISON:  November 24, 2017    FINDINGS:   Heart is  borderline in size. The pulmonary vasculature is normal.   There is abnormal increased density in the left lower lung consistent  with atelectasis or pneumonia. There is mild blunting of the  costophrenic angle on the left.      Impression    IMPRESSION:  Left lung opacity consistent with atelectasis or  pneumonia      ENDER SPARKS MD   US Lower Extremity Venous Duplex Bilateral    Narrative    Exam:US LOWER EXTREMITY VENOUS DUPLEX BILATERAL    History:Leg swelling    Comparisons:none    Technique: Venous duplex ultrasonography of the bilateral lower  extremities was performed.     Findings: The common femoral veins, superficial femoral veins and  popliteal veins are fully compressible with spontaneous and  augmentable venous flow.           Impression    Impression: No evidence of deep venous thrombosis within the lower  extremities.    ENDER SPARKS MD   XR Chest Port 1 View    Narrative    PROCEDURE:  XR CHEST PORT 1 VW    HISTORY:  dyspnea; .     COMPARISON:  October 27, 2018    FINDINGS:   Heart is borderline in size. The pulmonary vasculature is normal.   There is persistent left lung opacity which is stable from previous  examination. Some new right lung opacity is noted suspicious for  pneumonia. There is possible blunting of left costophrenic angle      Impression    IMPRESSION:  Worsening right lung opacity consistent with pneumonia      ENDER SPARKS MD

## 2018-10-31 NOTE — PLAN OF CARE
Face to face report given with opportunity to observe patient.    Report given to Abdirizak Swanson   10/30/2018  11:37 PM

## 2018-10-31 NOTE — PLAN OF CARE
Problem: Patient Care Overview  Goal: Plan of Care/Patient Progress Review  Outcome: Adequate for Discharge Date Met: 10/31/18  Patient discharged at 11:10 AM via wheel chair accompanied by other:transport tech and staff. Prescriptions sent with patient to fill . All belongings sent with patient.     Discharge instructions reviewed with Brenda KENDRICK. Listed belongings gathered and returned to patient.     Patient discharged to [facility].   Report called to Nursing Home:  Brenda KENDRICK    Core Measures and Vaccines  Core Measures applicable during stay: No. If yes, state diagnosis: n/a  Pneumonia and Influenza given prior to discharge, if indicated: N/A    Surgical Patient   Surgical Procedures during stay: n/a  Did patient receive discharge instruction on wound care and recognition of infection symptoms? N/A    MISC  Follow up appointment made:  Yes  Home and hospital aquired medications returned to patient: N/A  Patient reports pain was well managed at discharge: Yes

## 2018-10-31 NOTE — PROGRESS NOTES
Left message for daughter, Brooke in regards to discharge.  Health Line arranged for 11 am.  Digna KENDRICK aware.  Updated [facility].  Faxed orders.      Name: Danette Ramos    MRN#: [id number]    Reason for Hospitalization: Pneumonia [J18.9]    SHARIF: average     Discharge Date: October 31, 2018    Patient / Family response to discharge plan: agreeable     Follow-Up Appt: No future appointments.    Other Providers (Care Coordinator, County Services, PCA services etc): Yes: see above     Discharge Disposition: nursing home- return to [facility]     Patricia Salcido

## 2018-10-31 NOTE — PLAN OF CARE
Face to face report given with opportunity to observe patient.    Report given to MIREILLE Sawyer.    SN Hilaria  10/31/2018  11:50 AM

## 2018-10-31 NOTE — PROVIDER NOTIFICATION
DATE:  10/31/2018   TIME OF RECEIPT FROM LAB:  6:01  LAB TEST:  K+  LAB VALUE:  2.9  RESULTS GIVEN WITH READ-BACK TO (PROVIDER):  Dr. Prasad  TIME LAB VALUE REPORTED TO PROVIDER:   6:13

## 2018-10-31 NOTE — PLAN OF CARE
Face to face report given with opportunity to observe patient.    Report given to MIREILLE Sawyer SN   10/31/2018  11:47 AM

## 2018-10-31 NOTE — PHARMACY
[facility]    Pharmacy      Antimicrobial Stewardship Note     Current antimicrobial therapy:  Anti-infectives (Future)    Start     Dose/Rate Route Frequency Ordered Stop    10/28/18 1130  azithromycin (ZITHROMAX) 250 mg in sodium chloride 0.9 % 250 mL intermittent infusion      250 mg  250 mL/hr over 1 Hours Intravenous EVERY 24 HOURS 10/27/18 1608 11/01/18 1129    10/28/18 1100  cefTRIAXone IN D5W (ROCEPHIN) intermittent infusion 2 g      2 g  100 mL/hr over 30 Minutes Intravenous EVERY 24 HOURS 10/27/18 1608            Indication: CAP    Days of Therapy: 5     Pertinent labs:  Creatinine   Creatinine   Date Value Ref Range Status   10/31/2018 0.48 (L) 0.52 - 1.04 mg/dL Final   10/30/2018 0.59 0.52 - 1.04 mg/dL Final   10/29/2018 0.55 0.52 - 1.04 mg/dL Final     WBC   WBC   Date Value Ref Range Status   10/31/2018 4.3 4.0 - 11.0 10e9/L Final   10/30/2018 6.8 4.0 - 11.0 10e9/L Final   10/29/2018 6.0 4.0 - 11.0 10e9/L Final     Procalcitonin   Procalcitonin   Date Value Ref Range Status   10/29/2018 <0.05 ng/ml Final     Comment:     <0.05 ng/ml  Normal  Recommendation: Very low risk of bacterial infection.   Discourage antibiotics unless strong clinical suspicion for serious infection.     10/28/2018 <0.05 ng/ml Final     Comment:     <0.05 ng/ml  Normal  Recommendation: Very low risk of bacterial infection.   Discourage antibiotics unless strong clinical suspicion for serious infection.       CRP   CRP Inflammation   Date Value Ref Range Status   10/27/2018 3.3 0.0 - 8.0 mg/L Final   02/22/2018 41.6 (H) 0.0 - 8.0 mg/L Final       Culture Results:    Influenza A and B and RSV PCR [[id number]] Collected: 10/28/18 0652     Order Status: Completed Lab Status: Final result Updated: 10/28/18 0734     Specimen: Nares from Nasopharyngeal       Specimen Description Nares      Influenza A PCR Negative       Flu A target RNA not detected, presumed negative for Influenza A or the viral   load is below the limit of  detection.            Influenza B PCR Negative       Flu B target RNA not detected, presumed negative for Influenza B or the viral   load is below the limit of detection.            Resp Syncytial Virus Negative       RSV target RNA not detected, presumed negative for Respiratory Syncitial Virus    or the viral load is below the limit of detection.   FDA approved assay performed using [employer] GeneXpert(R) real-time PCR.           Sputum Culture Aerobic Bacterial      Order Status: No result Lab Status: No result      Specimen: Sputum      Gram stain      Order Status: No result Lab Status: No result      Specimen: Sputum      Active MRSA Surveillance Culture [[id number]] Collected: 10/27/18 1520     Order Status: Completed Lab Status: Final result Updated: 10/28/18 1059     Specimen: Nares from Nose       Specimen Description Nares      Culture Micro No MRSA isolated     Blood culture [[id number]] Collected: 10/27/18 1034     Order Status: Completed Lab Status: Preliminary result Updated: 10/30/18 1430     Specimen: Blood       Specimen Description Blood      Culture Micro No growth after 3 days     Blood culture      Order Status: Canceled Lab Status: No result      Specimen: Blood      Urine Culture Aerobic Bacterial [[id number]] (Abnormal)  Collected: 10/27/18 0917     Order Status: Completed Lab Status: Final result Updated: 10/29/18 0654     Specimen: Catheterized Urine       Specimen Description Catheterized Urine      Culture Micro >100,000 colonies/mL   Proteus mirabilis    (A)     Culture & Susceptibility      PROTEUS MIRABILIS      Antibiotic Interpretation Sensitivity Unit Method Status     AMPICILLIN Sensitive <=2 ug/mL VAIBHAV Final     AMPICILLIN/SULBACTAM Sensitive <=2 ug/mL VAIBHAV Final     Amoxicillin/Clav Sensitive <=2 ug/mL VAIBHAV Final     CEFAZOLIN Sensitive <=4 ug/mL VAIBHAV Final     Comment: Cefazolin VAIBHAV breakpoints are for the treatment of uncomplicated urinary tract   infections.  For the treatment of systemic  infections, please contact the   laboratory for additional testing.     CEFEPIME Sensitive <=1 ug/mL VAIBHAV Final     CEFTAZIDIME Sensitive <=1 ug/mL VAIBHAV Final     CEFTRIAXONE Sensitive <=1 ug/mL VAIBHAV Final     CIPROFLOXACIN Sensitive <=0.25 ug/mL VAIBHAV Final     GENTAMICIN Sensitive <=1 ug/mL VAIBHAV Final     LEVOFLOXACIN Sensitive 0.25 ug/mL VAIBHAV Final     NITROFURANTOIN Resistant 128 ug/mL VAIBHAV Final     Comment: Intrinsically Resistant     Piperacillin/Tazo Sensitive <=4 ug/mL VAIBHAV Final     TOBRAMYCIN Sensitive <=1 ug/mL VAIBHAV Final     Trimethoprim/Sulfa Sensitive <=1/19 ug/mL VAIBHAV Final                           Blood culture [[id number]] Collected: 10/27/18 0745     Order Status: Completed Lab Status: Preliminary result Updated: 10/30/18 1430     Specimen: Blood       Specimen Description Blood      Culture Micro No growth after 3 days              Recommendations/Interventions:  1. No recommendations at this time.    Aston Wright RPH  October 31, 2018

## 2018-10-31 NOTE — PLAN OF CARE
"Problem: Patient Care Overview  Goal: Plan of Care/Patient Progress Review  Outcome: No Change  Reason for hospital stay:  Pneumonia / A-Fib w/ RVR    Most recent vitals: /67  Pulse 93  Temp 99.7  F (37.6  C) (Temporal)  Resp 22  Ht 1.702 m (5' 7\")  Wt 78.5 kg (173 lb 1 oz)  SpO2 92%  BMI 27.11 kg/m2    Pain Management:  Denied any pain this shift    Orientation:  Oriented to self - aphasic with garbled words at times and grabbing at things in air at times. Answers most questions appropriately.     Cardiac:  AP irregular - tele reads A-Fib 70s-90s per ICU nurse. HR increased to 110s-120s consistently per tele / ICU nurse around 1820 and administered 5mg IV Lopressor PRN per orders at 1829 with HR decreasing to 80s-90s by 1900. Also receiving Cardizem and PO lopressor as scheduled.     Respiratory:  Audible upper airway wheezing at times, otherwise lungs clear but diminished with shallow breathing. RR 20-24 with sats 92-93% on RA.    GI:  Bowel sounds present x4 - Mechanical soft diet with thin liquids. Total feed. Appetite better this evening with  assisting with feeding patient - ate approx 75% supper.    :  Incontinent of urine - wears brief.     Skin Issues:  Red blanchable coccyx - repositioned every 2 hours.     IVF:  NS infusing at 50mL/hr    ABX:  Continues on IV Rocephin / IV Zithromax    Mobility:  EZ Stand for transfers    Safety:  Alarms on    10/30/2018  9:39 PM  Chava Swanson          "

## 2022-11-06 ENCOUNTER — APPOINTMENT (OUTPATIENT)
Dept: GENERAL RADIOLOGY | Facility: HOSPITAL | Age: 82
End: 2022-11-06
Attending: INTERNAL MEDICINE
Payer: MEDICARE

## 2022-11-06 ENCOUNTER — HOSPITAL ENCOUNTER (EMERGENCY)
Facility: HOSPITAL | Age: 82
Discharge: OTHER INSTITUTION | End: 2022-11-06
Attending: INTERNAL MEDICINE
Payer: MEDICARE

## 2022-11-06 ENCOUNTER — APPOINTMENT (OUTPATIENT)
Dept: CT IMAGING | Facility: HOSPITAL | Age: 82
End: 2022-11-06
Attending: INTERNAL MEDICINE
Payer: MEDICARE

## 2022-11-06 VITALS
RESPIRATION RATE: 20 BRPM | WEIGHT: 159.61 LBS | TEMPERATURE: 98.3 F | OXYGEN SATURATION: 93 % | HEART RATE: 96 BPM | DIASTOLIC BLOOD PRESSURE: 82 MMHG | SYSTOLIC BLOOD PRESSURE: 125 MMHG | BODY MASS INDEX: 25 KG/M2

## 2022-11-06 DIAGNOSIS — R31.9 URINARY TRACT INFECTION WITH HEMATURIA, SITE UNSPECIFIED: ICD-10-CM

## 2022-11-06 DIAGNOSIS — N39.0 URINARY TRACT INFECTION WITH HEMATURIA, SITE UNSPECIFIED: ICD-10-CM

## 2022-11-06 LAB
ALBUMIN SERPL BCG-MCNC: 3.1 G/DL (ref 3.5–5.2)
ALBUMIN UR-MCNC: 70 MG/DL
ALP SERPL-CCNC: 80 U/L (ref 35–104)
ALT SERPL W P-5'-P-CCNC: 9 U/L (ref 10–35)
ANION GAP SERPL CALCULATED.3IONS-SCNC: 9 MMOL/L (ref 7–15)
APPEARANCE UR: ABNORMAL
AST SERPL W P-5'-P-CCNC: 19 U/L (ref 10–35)
BACTERIA #/AREA URNS HPF: ABNORMAL /HPF
BASOPHILS # BLD AUTO: 0 10E3/UL (ref 0–0.2)
BASOPHILS NFR BLD AUTO: 0 %
BILIRUB SERPL-MCNC: 0.4 MG/DL
BILIRUB UR QL STRIP: NEGATIVE
BUN SERPL-MCNC: 12.6 MG/DL (ref 8–23)
CALCIUM SERPL-MCNC: 8.2 MG/DL (ref 8.8–10.2)
CHLORIDE SERPL-SCNC: 106 MMOL/L (ref 98–107)
CK SERPL-CCNC: 44 U/L (ref 26–192)
COLOR UR AUTO: YELLOW
CREAT SERPL-MCNC: 0.5 MG/DL (ref 0.51–0.95)
CRP SERPL-MCNC: 165.64 MG/L
DEPRECATED HCO3 PLAS-SCNC: 27 MMOL/L (ref 22–29)
EOSINOPHIL # BLD AUTO: 0.1 10E3/UL (ref 0–0.7)
EOSINOPHIL NFR BLD AUTO: 3 %
ERYTHROCYTE [DISTWIDTH] IN BLOOD BY AUTOMATED COUNT: 14.5 % (ref 10–15)
FLUAV RNA SPEC QL NAA+PROBE: NEGATIVE
FLUBV RNA RESP QL NAA+PROBE: NEGATIVE
GFR SERPL CREATININE-BSD FRML MDRD: >90 ML/MIN/1.73M2
GLUCOSE SERPL-MCNC: 96 MG/DL (ref 70–99)
GLUCOSE UR STRIP-MCNC: NEGATIVE MG/DL
HCT VFR BLD AUTO: 37.2 % (ref 35–47)
HGB BLD-MCNC: 12.2 G/DL (ref 11.7–15.7)
HGB UR QL STRIP: ABNORMAL
IMM GRANULOCYTES # BLD: 0 10E3/UL
IMM GRANULOCYTES NFR BLD: 0 %
INR PPP: 3.09 (ref 0.85–1.15)
KETONES UR STRIP-MCNC: NEGATIVE MG/DL
LACTATE SERPL-SCNC: 1.4 MMOL/L (ref 0.7–2)
LEUKOCYTE ESTERASE UR QL STRIP: ABNORMAL
LYMPHOCYTES # BLD AUTO: 1.3 10E3/UL (ref 0.8–5.3)
LYMPHOCYTES NFR BLD AUTO: 29 %
MCH RBC QN AUTO: 30.7 PG (ref 26.5–33)
MCHC RBC AUTO-ENTMCNC: 32.8 G/DL (ref 31.5–36.5)
MCV RBC AUTO: 94 FL (ref 78–100)
MONOCYTES # BLD AUTO: 0.3 10E3/UL (ref 0–1.3)
MONOCYTES NFR BLD AUTO: 6 %
MUCOUS THREADS #/AREA URNS LPF: PRESENT /LPF
NEUTROPHILS # BLD AUTO: 2.9 10E3/UL (ref 1.6–8.3)
NEUTROPHILS NFR BLD AUTO: 62 %
NITRATE UR QL: POSITIVE
NRBC # BLD AUTO: 0 10E3/UL
NRBC BLD AUTO-RTO: 0 /100
PH UR STRIP: 5.5 [PH] (ref 4.7–8)
PLATELET # BLD AUTO: 129 10E3/UL (ref 150–450)
POTASSIUM SERPL-SCNC: 4 MMOL/L (ref 3.4–5.3)
PROCALCITONIN SERPL IA-MCNC: 0.06 NG/ML
PROT SERPL-MCNC: 7.4 G/DL (ref 6.4–8.3)
RBC # BLD AUTO: 3.98 10E6/UL (ref 3.8–5.2)
RBC URINE: 50 /HPF
RSV RNA SPEC NAA+PROBE: NEGATIVE
SARS-COV-2 RNA RESP QL NAA+PROBE: NEGATIVE
SODIUM SERPL-SCNC: 142 MMOL/L (ref 136–145)
SP GR UR STRIP: 1.03 (ref 1–1.03)
SQUAMOUS EPITHELIAL: 0 /HPF
UROBILINOGEN UR STRIP-MCNC: NORMAL MG/DL
WBC # BLD AUTO: 4.6 10E3/UL (ref 4–11)
WBC URINE: 23 /HPF

## 2022-11-06 PROCEDURE — 87088 URINE BACTERIA CULTURE: CPT | Performed by: INTERNAL MEDICINE

## 2022-11-06 PROCEDURE — 96365 THER/PROPH/DIAG IV INF INIT: CPT

## 2022-11-06 PROCEDURE — 93010 ELECTROCARDIOGRAM REPORT: CPT | Performed by: INTERNAL MEDICINE

## 2022-11-06 PROCEDURE — 99285 EMERGENCY DEPT VISIT HI MDM: CPT | Mod: 25,CS

## 2022-11-06 PROCEDURE — 36415 COLL VENOUS BLD VENIPUNCTURE: CPT | Performed by: INTERNAL MEDICINE

## 2022-11-06 PROCEDURE — 86140 C-REACTIVE PROTEIN: CPT | Performed by: INTERNAL MEDICINE

## 2022-11-06 PROCEDURE — 84145 PROCALCITONIN (PCT): CPT | Performed by: INTERNAL MEDICINE

## 2022-11-06 PROCEDURE — G1010 CDSM STANSON: HCPCS

## 2022-11-06 PROCEDURE — C9803 HOPD COVID-19 SPEC COLLECT: HCPCS

## 2022-11-06 PROCEDURE — 71045 X-RAY EXAM CHEST 1 VIEW: CPT

## 2022-11-06 PROCEDURE — 250N000011 HC RX IP 250 OP 636: Performed by: INTERNAL MEDICINE

## 2022-11-06 PROCEDURE — 82550 ASSAY OF CK (CPK): CPT | Performed by: INTERNAL MEDICINE

## 2022-11-06 PROCEDURE — 87186 SC STD MICRODIL/AGAR DIL: CPT | Mod: 59 | Performed by: INTERNAL MEDICINE

## 2022-11-06 PROCEDURE — 80053 COMPREHEN METABOLIC PANEL: CPT | Performed by: INTERNAL MEDICINE

## 2022-11-06 PROCEDURE — 99284 EMERGENCY DEPT VISIT MOD MDM: CPT | Mod: CS | Performed by: INTERNAL MEDICINE

## 2022-11-06 PROCEDURE — 83605 ASSAY OF LACTIC ACID: CPT | Performed by: INTERNAL MEDICINE

## 2022-11-06 PROCEDURE — 81001 URINALYSIS AUTO W/SCOPE: CPT | Performed by: INTERNAL MEDICINE

## 2022-11-06 PROCEDURE — 85610 PROTHROMBIN TIME: CPT | Performed by: INTERNAL MEDICINE

## 2022-11-06 PROCEDURE — 87637 SARSCOV2&INF A&B&RSV AMP PRB: CPT | Performed by: INTERNAL MEDICINE

## 2022-11-06 PROCEDURE — 85025 COMPLETE CBC W/AUTO DIFF WBC: CPT | Performed by: INTERNAL MEDICINE

## 2022-11-06 PROCEDURE — 258N000003 HC RX IP 258 OP 636: Performed by: INTERNAL MEDICINE

## 2022-11-06 PROCEDURE — 93005 ELECTROCARDIOGRAM TRACING: CPT

## 2022-11-06 PROCEDURE — 87040 BLOOD CULTURE FOR BACTERIA: CPT | Performed by: INTERNAL MEDICINE

## 2022-11-06 RX ORDER — NYSTATIN 100000 [USP'U]/G
POWDER TOPICAL PRN
COMMUNITY
End: 2023-03-20

## 2022-11-06 RX ORDER — CEFTRIAXONE SODIUM 1 G/50ML
1 INJECTION, SOLUTION INTRAVENOUS ONCE
Status: COMPLETED | OUTPATIENT
Start: 2022-11-06 | End: 2022-11-06

## 2022-11-06 RX ORDER — KETOCONAZOLE 20 MG/ML
SHAMPOO TOPICAL
COMMUNITY

## 2022-11-06 RX ORDER — DILTIAZEM HYDROCHLORIDE 120 MG/1
120 CAPSULE, COATED, EXTENDED RELEASE ORAL DAILY
COMMUNITY
End: 2023-07-10

## 2022-11-06 RX ORDER — CEFDINIR 250 MG/5ML
300 POWDER, FOR SUSPENSION ORAL 2 TIMES DAILY
Qty: 60 ML | Refills: 0 | Status: SHIPPED | OUTPATIENT
Start: 2022-11-06 | End: 2022-11-11

## 2022-11-06 RX ORDER — DOCUSATE SODIUM 100 MG/1
100 CAPSULE, LIQUID FILLED ORAL
COMMUNITY
End: 2022-11-06

## 2022-11-06 RX ORDER — FAMCICLOVIR 100 %
10 POWDER (GRAM) MISCELLANEOUS
COMMUNITY
End: 2022-11-06

## 2022-11-06 RX ADMIN — CEFTRIAXONE SODIUM 1 G: 1 INJECTION, SOLUTION INTRAVENOUS at 03:13

## 2022-11-06 RX ADMIN — SODIUM CHLORIDE 1000 ML: 9 INJECTION, SOLUTION INTRAVENOUS at 01:05

## 2022-11-06 ASSESSMENT — ACTIVITIES OF DAILY LIVING (ADL)
ADLS_ACUITY_SCORE: 35

## 2022-11-06 ASSESSMENT — ENCOUNTER SYMPTOMS: FATIGUE: 1

## 2022-11-06 NOTE — ED NOTES
Patient's brief noted to be wet with urine. Patient agreeable to quick cath to obtain sample. Patient voided urine while this RN was obtaining sample.

## 2022-11-06 NOTE — ED NOTES
"Patient arrives via [facility] with [facility] from [facility]. Per staff, evening shift reported that patient was more lethargic and had not voided. Nurse was bladder scanning patient and noted she was lethargic and obtained vitals signs. Nurse concerned with irregular pulse and reports pulse was between \"100-300\". [facility] reports HR en route was 110's and a-fib. EMS started an IV en route and started NS infusion. Patient arrives alert to self, which is baseline per NH staff. Patient has hx of dementia and hx of CVA. Patient denies pain. Patient placed on cardiac and vitals monitors. Call light in reach.   "

## 2022-11-06 NOTE — ED NOTES
Called [facility] for transport back to [facility] was unable to contact them  Will try again @ 7

## 2022-11-06 NOTE — ED NOTES
Report called to Farhana, nurse at [facility]. Reviewed discharge instructions and advised that patient would be discharge back pending transportation.

## 2022-11-06 NOTE — ED PROVIDER NOTES
History     Chief Complaint   Patient presents with     Generalized Weakness     Atrial Fib     The history is provided by the EMS personnel and the nursing home.   Fatigue  Severity:  Unable to specify  Onset quality:  Unable to specify  Timing:  Constant  Progression:  Worsening  Chronicity:  New        Allergies:  Allergies   Allergen Reactions     Bacitracin Rash     Contact dermatitis       Problem List:    Patient Active Problem List    Diagnosis Date Noted     Pneumonia 10/27/2018     Priority: Medium     Hospice care patient 01/07/2018     Priority: Medium        Past Medical History:    Past Medical History:   Diagnosis Date     Anxiety      Aphasia      Atrial fibrillation with RVR (H)      Bilateral leg paresthesia      Bradycardia      Chronic anticoagulation      Complex partial seizure (H)      CVA (cerebral vascular accident) (H)      Dementia      Hypertension      Orthostatic tremor      Osteopenia      Speech and language deficit as late effect of cerebrovascular accident (CVA)        Past Surgical History:    Past Surgical History:   Procedure Laterality Date     CHOLECYSTECTOMY       COLONOSCOPY  12/2009     hysterectomy         Family History:    No family history on file.    Social History:  Marital Status:   [2]        Medications:    Acetaminophen (TYLENOL PO)  ACETAMINOPHEN PO  Atorvastatin Calcium (LIPITOR PO)  benzocaine (ORAJEL MAXIMUM STRENGTH) 20 % GEL gel  bisacodyl (DULCOLAX) 10 MG Suppository  cefdinir (OMNICEF) 250 MG/5ML suspension  diltiazem ER COATED BEADS (CARDIZEM CD) 120 MG 24 hr capsule  ketoconazole (NIZORAL) 2 % external shampoo  LEVETIRACETAM PO  magnesium hydroxide (MILK OF MAGNESIA) 400 MG/5ML suspension  metoprolol tartrate (LOPRESSOR) 25 MG tablet  nystatin (MYCOSTATIN) 554495 UNIT/GM external powder  RisperiDONE (RISPERDAL PO)  senna-docusate (SENOKOT-S;PERICOLACE) 8.6-50 MG per tablet  SERTRALINE HCL PO  Vitamin D, Cholecalciferol, 1000 units CAPS  Warfarin  Sodium (COUMADIN PO)  Warfarin Sodium (COUMADIN PO)          Review of Systems   Unable to perform ROS: Dementia   Constitutional: Positive for fatigue.       Physical Exam   BP: 130/79  Pulse: 105  Temp: 98  F (36.7  C)  Resp: 24  Weight: 72.4 kg (159 lb 9.8 oz)  SpO2: 94 %      Physical Exam  Vitals and nursing note reviewed.   Constitutional:       Appearance: She is well-developed and well-nourished.   HENT:      Head: Normocephalic and atraumatic.      Mouth/Throat:      Pharynx: No oropharyngeal exudate.   Eyes:      Conjunctiva/sclera: Conjunctivae normal.      Pupils: Pupils are equal, round, and reactive to light.   Neck:      Thyroid: No thyromegaly.      Vascular: No JVD.      Trachea: No tracheal deviation.   Cardiovascular:      Rate and Rhythm: Rhythm irregular.      Pulses: Intact distal pulses.      Heart sounds: Normal heart sounds. No murmur heard.    No friction rub. No gallop.   Pulmonary:      Effort: Pulmonary effort is normal. No respiratory distress.      Breath sounds: Normal breath sounds. No stridor. No wheezing or rales.   Chest:      Chest wall: No tenderness.   Abdominal:      General: Bowel sounds are normal. There is no distension.      Palpations: Abdomen is soft. There is no mass.      Tenderness: There is no abdominal tenderness. There is no guarding or rebound.   Musculoskeletal:         General: No tenderness or edema. Normal range of motion.      Cervical back: Normal range of motion and neck supple.   Lymphadenopathy:      Cervical: No cervical adenopathy.   Skin:     General: Skin is warm and dry.      Coloration: Skin is not pale.      Findings: No erythema or rash.   Neurological:      Mental Status: She is alert. Mental status is at baseline.   Psychiatric:         Behavior: Behavior normal.         ED Course                 Procedures                  Results for orders placed or performed during the hospital encounter of 11/06/22 (from the past 24 hour(s))   UA reflex to  Microscopic and Culture    Specimen: Urine, Catheter   Result Value Ref Range    Color Urine Yellow Colorless, Straw, Light Yellow, Yellow    Appearance Urine Slightly Cloudy (A) Clear    Glucose Urine Negative Negative mg/dL    Bilirubin Urine Negative Negative    Ketones Urine Negative Negative mg/dL    Specific Gravity Urine 1.035 1.003 - 1.035    Blood Urine Moderate (A) Negative    pH Urine 5.5 4.7 - 8.0    Protein Albumin Urine 70 (A) Negative mg/dL    Urobilinogen Urine Normal Normal, 2.0 mg/dL    Nitrite Urine Positive (A) Negative    Leukocyte Esterase Urine Moderate (A) Negative    Bacteria Urine Many (A) None Seen /HPF    Mucus Urine Present (A) None Seen /LPF    RBC Urine 50 (H) <=2 /HPF    WBC Urine 23 (H) <=5 /HPF    Squamous Epithelials Urine 0 <=1 /HPF    Narrative    Urine Culture ordered based on laboratory criteria   Symptomatic; Unknown Influenza A/B & SARS-CoV2 (COVID-19) Virus PCR Multiplex Nasopharyngeal    Specimen: Nasopharyngeal; Swab   Result Value Ref Range    Influenza A PCR Negative Negative    Influenza B PCR Negative Negative    RSV PCR Negative Negative    SARS CoV2 PCR Negative Negative    Narrative    Testing was performed using the Xpert Xpress CoV2/Flu/RSV Assay on the [employer] GeneXpert Instrument. This test should be ordered for the detection of SARS-CoV-2 and influenza viruses in individuals who meet clinical and/or epidemiological criteria. Test performance is unknown in asymptomatic patients. This test is for in vitro diagnostic use under the FDA EUA for laboratories certified under CLIA to perform high or moderate complexity testing. This test has not been FDA cleared or approved. A negative result does not rule out the presence of PCR inhibitors in the specimen or target RNA in concentration below the limit of detection for the assay. If only one viral target is positive but coinfection with multiple targets is suspected, the sample should be re-tested with another FDA  cleared, approved, or authorized test, if coinfection would change clinical management. This test was validated by the [facility]. These laboratories are certified under the Clinical Laboratory Improvement Amendments of 1988 (CLIA-88) as qualified to perform high complexity laboratory testing.   CBC with Platelets & Differential    Narrative    The following orders were created for panel order CBC with Platelets & Differential.  Procedure                               Abnormality         Status                     ---------                               -----------         ------                     CBC with platelets and d...[[id number]]  Abnormal            Final result                 Please view results for these tests on the individual orders.   Comprehensive metabolic panel   Result Value Ref Range    Sodium 142 136 - 145 mmol/L    Potassium 4.0 3.4 - 5.3 mmol/L    Chloride 106 98 - 107 mmol/L    Carbon Dioxide (CO2) 27 22 - 29 mmol/L    Anion Gap 9 7 - 15 mmol/L    Urea Nitrogen 12.6 8.0 - 23.0 mg/dL    Creatinine 0.50 (L) 0.51 - 0.95 mg/dL    Calcium 8.2 (L) 8.8 - 10.2 mg/dL    Glucose 96 70 - 99 mg/dL    Alkaline Phosphatase 80 35 - 104 U/L    AST 19 10 - 35 U/L    ALT 9 (L) 10 - 35 U/L    Protein Total 7.4 6.4 - 8.3 g/dL    Albumin 3.1 (L) 3.5 - 5.2 g/dL    Bilirubin Total 0.4 <=1.2 mg/dL    GFR Estimate >90 >60 mL/min/1.73m2   CK total   Result Value Ref Range    CK 44 26 - 192 U/L   CRP inflammation   Result Value Ref Range    CRP Inflammation 165.64 (H) <5.00 mg/L   Lactic acid whole blood   Result Value Ref Range    Lactic Acid 1.4 0.7 - 2.0 mmol/L   Procalcitonin   Result Value Ref Range    Procalcitonin 0.06 (H) <0.05 ng/mL   INR   Result Value Ref Range    INR 3.09 (H) 0.85 - 1.15   CBC with platelets and differential   Result Value Ref Range    WBC Count 4.6 4.0 - 11.0 10e3/uL    RBC Count 3.98 3.80 - 5.20 10e6/uL    Hemoglobin 12.2 11.7 - 15.7 g/dL    Hematocrit 37.2 35.0 - 47.0  %    MCV 94 78 - 100 fL    MCH 30.7 26.5 - 33.0 pg    MCHC 32.8 31.5 - 36.5 g/dL    RDW 14.5 10.0 - 15.0 %    Platelet Count 129 (L) 150 - 450 10e3/uL    % Neutrophils 62 %    % Lymphocytes 29 %    % Monocytes 6 %    % Eosinophils 3 %    % Basophils 0 %    % Immature Granulocytes 0 %    NRBCs per 100 WBC 0 <1 /100    Absolute Neutrophils 2.9 1.6 - 8.3 10e3/uL    Absolute Lymphocytes 1.3 0.8 - 5.3 10e3/uL    Absolute Monocytes 0.3 0.0 - 1.3 10e3/uL    Absolute Eosinophils 0.1 0.0 - 0.7 10e3/uL    Absolute Basophils 0.0 0.0 - 0.2 10e3/uL    Absolute Immature Granulocytes 0.0 <=0.4 10e3/uL    Absolute NRBCs 0.0 10e3/uL       Medications   0.9% sodium chloride BOLUS (0 mLs Intravenous Stopped 11/6/22 0212)   cefTRIAXone in d5w (ROCEPHIN) intermittent infusion 1 g (0 g Intravenous Stopped 11/6/22 0352)       Assessments & Plan (with Medical Decision Making)   Sent from NH for generalized weakness, tachycardia    EKG Afib @  91  Labs reviewed  UA positive for WBC, RBC and nitrates  IV Rocephin given ,   CT abd: atelectasia vs pna in lung,   Not hypoxic compared to baseline, non febrile  Can be treated as outpatient  Cefdinir prescribed    D C back To NH  I have reviewed the nursing notes.    I have reviewed the findings, diagnosis, plan and need for follow up with the patient.      New Prescriptions    CEFDINIR (OMNICEF) 250 MG/5ML SUSPENSION    Take 6 mLs (300 mg) by mouth 2 times daily for 5 days       Final diagnoses:   Urinary tract infection with hematuria, site unspecified       11/6/2022   [facility]     Long Snow MD  11/06/22 2703

## 2022-11-08 ENCOUNTER — DOCUMENTATION ONLY (OUTPATIENT)
Dept: OTHER | Facility: CLINIC | Age: 82
End: 2022-11-08

## 2022-11-10 LAB
BACTERIA UR CULT: ABNORMAL

## 2022-11-11 LAB
BACTERIA BLD CULT: NO GROWTH
BACTERIA BLD CULT: NO GROWTH

## 2022-11-17 ENCOUNTER — APPOINTMENT (OUTPATIENT)
Dept: CT IMAGING | Facility: HOSPITAL | Age: 82
End: 2022-11-17
Attending: PHYSICIAN ASSISTANT
Payer: MEDICARE

## 2022-11-17 ENCOUNTER — HOSPITAL ENCOUNTER (EMERGENCY)
Facility: HOSPITAL | Age: 82
Discharge: HOME | End: 2022-11-17
Attending: PHYSICIAN ASSISTANT | Admitting: PHYSICIAN ASSISTANT
Payer: MEDICARE

## 2022-11-17 VITALS
RESPIRATION RATE: 17 BRPM | HEART RATE: 88 BPM | OXYGEN SATURATION: 94 % | SYSTOLIC BLOOD PRESSURE: 122 MMHG | DIASTOLIC BLOOD PRESSURE: 87 MMHG | TEMPERATURE: 98 F

## 2022-11-17 DIAGNOSIS — N30.00 ACUTE CYSTITIS WITHOUT HEMATURIA: ICD-10-CM

## 2022-11-17 LAB
ALBUMIN SERPL BCG-MCNC: 3.2 G/DL (ref 3.5–5.2)
ALBUMIN UR-MCNC: 10 MG/DL
ALP SERPL-CCNC: 76 U/L (ref 35–104)
ALT SERPL W P-5'-P-CCNC: 15 U/L (ref 10–35)
ANION GAP SERPL CALCULATED.3IONS-SCNC: 8 MMOL/L (ref 7–15)
APPEARANCE UR: ABNORMAL
AST SERPL W P-5'-P-CCNC: 57 U/L (ref 10–35)
BACTERIA #/AREA URNS HPF: ABNORMAL /HPF
BASOPHILS # BLD AUTO: 0 10E3/UL (ref 0–0.2)
BASOPHILS NFR BLD AUTO: 1 %
BILIRUB SERPL-MCNC: 0.3 MG/DL
BILIRUB UR QL STRIP: NEGATIVE
BUN SERPL-MCNC: 14.9 MG/DL (ref 8–23)
CALCIUM SERPL-MCNC: 8.6 MG/DL (ref 8.8–10.2)
CHLORIDE SERPL-SCNC: 104 MMOL/L (ref 98–107)
COLOR UR AUTO: YELLOW
CREAT SERPL-MCNC: 0.52 MG/DL (ref 0.51–0.95)
CRP SERPL-MCNC: 3.4 MG/L
DEPRECATED HCO3 PLAS-SCNC: 26 MMOL/L (ref 22–29)
EOSINOPHIL # BLD AUTO: 0.1 10E3/UL (ref 0–0.7)
EOSINOPHIL NFR BLD AUTO: 2 %
ERYTHROCYTE [DISTWIDTH] IN BLOOD BY AUTOMATED COUNT: 15.4 % (ref 10–15)
GFR SERPL CREATININE-BSD FRML MDRD: >90 ML/MIN/1.73M2
GLUCOSE SERPL-MCNC: 117 MG/DL (ref 70–99)
GLUCOSE UR STRIP-MCNC: NEGATIVE MG/DL
HCT VFR BLD AUTO: 39.2 % (ref 35–47)
HGB BLD-MCNC: 13 G/DL (ref 11.7–15.7)
HGB UR QL STRIP: ABNORMAL
HYALINE CASTS: 4 /LPF
IMM GRANULOCYTES # BLD: 0 10E3/UL
IMM GRANULOCYTES NFR BLD: 0 %
INR PPP: 3.07 (ref 0.85–1.15)
KETONES UR STRIP-MCNC: NEGATIVE MG/DL
LACTATE SERPL-SCNC: 1.6 MMOL/L (ref 0.7–2)
LEUKOCYTE ESTERASE UR QL STRIP: ABNORMAL
LYMPHOCYTES # BLD AUTO: 1.7 10E3/UL (ref 0.8–5.3)
LYMPHOCYTES NFR BLD AUTO: 39 %
MCH RBC QN AUTO: 30.9 PG (ref 26.5–33)
MCHC RBC AUTO-ENTMCNC: 33.2 G/DL (ref 31.5–36.5)
MCV RBC AUTO: 93 FL (ref 78–100)
MONOCYTES # BLD AUTO: 0.2 10E3/UL (ref 0–1.3)
MONOCYTES NFR BLD AUTO: 5 %
MUCOUS THREADS #/AREA URNS LPF: PRESENT /LPF
NEUTROPHILS # BLD AUTO: 2.3 10E3/UL (ref 1.6–8.3)
NEUTROPHILS NFR BLD AUTO: 53 %
NITRATE UR QL: POSITIVE
NRBC # BLD AUTO: 0 10E3/UL
NRBC BLD AUTO-RTO: 0 /100
PH UR STRIP: 5.5 [PH] (ref 4.7–8)
PLATELET # BLD AUTO: 272 10E3/UL (ref 150–450)
POTASSIUM SERPL-SCNC: 5.4 MMOL/L (ref 3.4–5.3)
PROT SERPL-MCNC: 7.4 G/DL (ref 6.4–8.3)
RBC # BLD AUTO: 4.21 10E6/UL (ref 3.8–5.2)
RBC URINE: 8 /HPF
SODIUM SERPL-SCNC: 138 MMOL/L (ref 136–145)
SP GR UR STRIP: 1.03 (ref 1–1.03)
SQUAMOUS EPITHELIAL: 0 /HPF
TROPONIN T SERPL HS-MCNC: 11 NG/L
UROBILINOGEN UR STRIP-MCNC: NORMAL MG/DL
WBC # BLD AUTO: 4.4 10E3/UL (ref 4–11)
WBC URINE: 41 /HPF

## 2022-11-17 PROCEDURE — 93010 ELECTROCARDIOGRAM REPORT: CPT | Performed by: INTERNAL MEDICINE

## 2022-11-17 PROCEDURE — 99285 EMERGENCY DEPT VISIT HI MDM: CPT | Mod: 25

## 2022-11-17 PROCEDURE — 84484 ASSAY OF TROPONIN QUANT: CPT | Performed by: PHYSICIAN ASSISTANT

## 2022-11-17 PROCEDURE — 86140 C-REACTIVE PROTEIN: CPT | Performed by: PHYSICIAN ASSISTANT

## 2022-11-17 PROCEDURE — 85025 COMPLETE CBC W/AUTO DIFF WBC: CPT | Performed by: PHYSICIAN ASSISTANT

## 2022-11-17 PROCEDURE — 99284 EMERGENCY DEPT VISIT MOD MDM: CPT | Performed by: PHYSICIAN ASSISTANT

## 2022-11-17 PROCEDURE — 80053 COMPREHEN METABOLIC PANEL: CPT | Performed by: PHYSICIAN ASSISTANT

## 2022-11-17 PROCEDURE — G1010 CDSM STANSON: HCPCS

## 2022-11-17 PROCEDURE — 93005 ELECTROCARDIOGRAM TRACING: CPT

## 2022-11-17 PROCEDURE — 85610 PROTHROMBIN TIME: CPT | Performed by: PHYSICIAN ASSISTANT

## 2022-11-17 PROCEDURE — 87186 SC STD MICRODIL/AGAR DIL: CPT | Performed by: PHYSICIAN ASSISTANT

## 2022-11-17 PROCEDURE — 250N000013 HC RX MED GY IP 250 OP 250 PS 637: Performed by: PHYSICIAN ASSISTANT

## 2022-11-17 PROCEDURE — 83605 ASSAY OF LACTIC ACID: CPT | Performed by: PHYSICIAN ASSISTANT

## 2022-11-17 PROCEDURE — 81001 URINALYSIS AUTO W/SCOPE: CPT | Performed by: PHYSICIAN ASSISTANT

## 2022-11-17 RX ORDER — AMOXICILLIN AND CLAVULANATE POTASSIUM 400; 57 MG/5ML; MG/5ML
875 POWDER, FOR SUSPENSION ORAL ONCE
Status: DISCONTINUED | OUTPATIENT
Start: 2022-11-17 | End: 2022-11-17 | Stop reason: RX

## 2022-11-17 RX ORDER — AMOXICILLIN AND CLAVULANATE POTASSIUM 400; 57 MG/5ML; MG/5ML
875 POWDER, FOR SUSPENSION ORAL 2 TIMES DAILY
Qty: 152.6 ML | Refills: 0 | Status: SHIPPED | OUTPATIENT
Start: 2022-11-18 | End: 2022-11-25

## 2022-11-17 RX ORDER — AMOXICILLIN AND CLAVULANATE POTASSIUM 400; 57 MG/5ML; MG/5ML
875 POWDER, FOR SUSPENSION ORAL 2 TIMES DAILY
Qty: 152.6 ML | Refills: 0 | Status: SHIPPED | OUTPATIENT
Start: 2022-11-17 | End: 2022-11-17

## 2022-11-17 RX ORDER — AMOXICILLIN AND CLAVULANATE POTASSIUM 400; 57 MG/5ML; MG/5ML
875 POWDER, FOR SUSPENSION ORAL 2 TIMES DAILY
Qty: 152.6 ML | Refills: 0 | Status: SHIPPED | OUTPATIENT
Start: 2022-11-18 | End: 2022-11-17

## 2022-11-17 RX ADMIN — AMOXICILLIN AND CLAVULANATE POTASSIUM 1 TABLET: 875; 125 TABLET, FILM COATED ORAL at 22:00

## 2022-11-17 ASSESSMENT — ENCOUNTER SYMPTOMS
DYSURIA: 1
BACK PAIN: 1

## 2022-11-17 ASSESSMENT — ACTIVITIES OF DAILY LIVING (ADL): ADLS_ACUITY_SCORE: 41

## 2022-11-18 NOTE — ED PROVIDER NOTES
History     Chief Complaint   Patient presents with     Fatigue     Generalized Weakness     The history is provided by the nursing home and a relative.     Danette Ramos is a 82 year old female, with a hx of stroke 8 years ago, with which led to the loss of her mobility and most speech ability, who presents from her nursing home with generalized weakness and loss of appetite.  Discussed with nursing staff at [facility], stating that she has had general malaise the last few days, not eating much, with lower than usual blood pressure in the 100s and HR 84.  States that she recently had a UTI and shingles, both of which were treated.  Usually able to say yes or no intermittently, but hasn't been doing that.  No recent falls.      Allergies:  Allergies   Allergen Reactions     Bacitracin Rash     Contact dermatitis       Problem List:    Patient Active Problem List    Diagnosis Date Noted     Pneumonia 10/27/2018     Priority: Medium     Hospice care patient 01/07/2018     Priority: Medium        Past Medical History:    Past Medical History:   Diagnosis Date     Anxiety      Aphasia      Atrial fibrillation with RVR (H)      Bilateral leg paresthesia      Bradycardia      Chronic anticoagulation      Complex partial seizure (H)      CVA (cerebral vascular accident) (H)      Dementia (H)      Hypertension      Orthostatic tremor      Osteopenia      Speech and language deficit as late effect of cerebrovascular accident (CVA)        Past Surgical History:    Past Surgical History:   Procedure Laterality Date     CHOLECYSTECTOMY       COLONOSCOPY  12/2009     hysterectomy         Family History:    History reviewed. No pertinent family history.    Social History:  Marital Status:   [2]  Social History     Tobacco Use     Smoking status: Unknown   Substance Use Topics     Alcohol use: Not Currently     Drug use: Never        Medications:    [START ON 11/18/2022] amoxicillin-clavulanate (AUGMENTIN) 400-57  MG/5ML suspension  Acetaminophen (TYLENOL PO)  ACETAMINOPHEN PO  Atorvastatin Calcium (LIPITOR PO)  benzocaine (ORAJEL MAXIMUM STRENGTH) 20 % GEL gel  bisacodyl (DULCOLAX) 10 MG Suppository  diltiazem ER COATED BEADS (CARDIZEM CD) 120 MG 24 hr capsule  ketoconazole (NIZORAL) 2 % external shampoo  LEVETIRACETAM PO  magnesium hydroxide (MILK OF MAGNESIA) 400 MG/5ML suspension  metoprolol tartrate (LOPRESSOR) 25 MG tablet  nystatin (MYCOSTATIN) 913203 UNIT/GM external powder  RisperiDONE (RISPERDAL PO)  senna-docusate (SENOKOT-S;PERICOLACE) 8.6-50 MG per tablet  SERTRALINE HCL PO  Vitamin D, Cholecalciferol, 1000 units CAPS  Warfarin Sodium (COUMADIN PO)  Warfarin Sodium (COUMADIN PO)          Review of Systems   Unable to perform ROS: Patient nonverbal (though later she was able to state that she had pain with urination and back pain)   Genitourinary: Positive for dysuria.   Musculoskeletal: Positive for back pain.       Physical Exam   BP: 140/95  Pulse: 84  Temp: 97.1  F (36.2  C)  Resp: 16  SpO2: 97 %      Physical Exam  Constitutional:       Comments: Initially minimally responsive.  Not opening eyes, but did follow other commands.  Non verbal.      After some time she was opening eyes, saying yes and no, nodding her head, but no other verbal response.     HENT:      Head: Normocephalic and atraumatic.      Nose: Nose normal.      Mouth/Throat:      Mouth: Mucous membranes are moist.   Eyes:      Pupils: Pupils are equal, round, and reactive to light.   Cardiovascular:      Rate and Rhythm: Normal rate. Rhythm irregular.      Pulses: Normal pulses.   Pulmonary:      Effort: Pulmonary effort is normal.   Abdominal:      General: Abdomen is flat. Bowel sounds are normal. There is no distension.   Musculoskeletal:      Cervical back: Normal range of motion.   Skin:     General: Skin is warm.      Findings: Lesion and rash present.      Comments: Erythematous rash with scabbed over blisters on the back.    Neurological:      Mental Status: Mental status is at baseline.         ED Course          EKG Interpretation:      Interpreted by Klever Bliss PA-C  Time reviewed: 2045  Symptoms at time of EKG: lethargy   Rhythm: atrial fibrillation - controlled  Rate: Normal  Axis: Normal  Ectopy: none  Conduction: normal  ST Segments/ T Waves: No ST-T wave changes  Q Waves: none  Comparison to prior: Unchanged from 11/6    Clinical Impression: atrial fibrillation (chronic)         Results for orders placed or performed during the hospital encounter of 11/17/22 (from the past 24 hour(s))   UA with Microscopic reflex to Culture    Specimen: Urine, Catheter   Result Value Ref Range    Color Urine Yellow Colorless, Straw, Light Yellow, Yellow    Appearance Urine Slightly Cloudy (A) Clear    Glucose Urine Negative Negative mg/dL    Bilirubin Urine Negative Negative    Ketones Urine Negative Negative mg/dL    Specific Gravity Urine 1.033 1.003 - 1.035    Blood Urine Trace (A) Negative    pH Urine 5.5 4.7 - 8.0    Protein Albumin Urine 10 (A) Negative mg/dL    Urobilinogen Urine Normal Normal, 2.0 mg/dL    Nitrite Urine Positive (A) Negative    Leukocyte Esterase Urine Moderate (A) Negative    Bacteria Urine Many (A) None Seen /HPF    Mucus Urine Present (A) None Seen /LPF    RBC Urine 8 (H) <=2 /HPF    WBC Urine 41 (H) <=5 /HPF    Squamous Epithelials Urine 0 <=1 /HPF    Hyaline Casts Urine 4 (H) <=2 /LPF    Narrative    Urine Culture ordered based on laboratory criteria   Comprehensive metabolic panel   Result Value Ref Range    Sodium 138 136 - 145 mmol/L    Potassium 5.4 (H) 3.4 - 5.3 mmol/L    Chloride 104 98 - 107 mmol/L    Carbon Dioxide (CO2) 26 22 - 29 mmol/L    Anion Gap 8 7 - 15 mmol/L    Urea Nitrogen 14.9 8.0 - 23.0 mg/dL    Creatinine 0.52 0.51 - 0.95 mg/dL    Calcium 8.6 (L) 8.8 - 10.2 mg/dL    Glucose 117 (H) 70 - 99 mg/dL    Alkaline Phosphatase 76 35 - 104 U/L    AST 57 (H) 10 - 35 U/L    ALT 15 10 - 35 U/L     Protein Total 7.4 6.4 - 8.3 g/dL    Albumin 3.2 (L) 3.5 - 5.2 g/dL    Bilirubin Total 0.3 <=1.2 mg/dL    GFR Estimate >90 >60 mL/min/1.73m2   Lactic acid whole blood   Result Value Ref Range    Lactic Acid 1.6 0.7 - 2.0 mmol/L   Troponin T, High Sensitivity   Result Value Ref Range    Troponin T, High Sensitivity 11 <=14 ng/L   CBC with platelets differential    Narrative    The following orders were created for panel order CBC with platelets differential.  Procedure                               Abnormality         Status                     ---------                               -----------         ------                     CBC with platelets and d...[[id number]]  Abnormal            Final result                 Please view results for these tests on the individual orders.   INR   Result Value Ref Range    INR 3.07 (H) 0.85 - 1.15   CRP inflammation   Result Value Ref Range    CRP Inflammation 3.40 <5.00 mg/L   CBC with platelets and differential   Result Value Ref Range    WBC Count 4.4 4.0 - 11.0 10e3/uL    RBC Count 4.21 3.80 - 5.20 10e6/uL    Hemoglobin 13.0 11.7 - 15.7 g/dL    Hematocrit 39.2 35.0 - 47.0 %    MCV 93 78 - 100 fL    MCH 30.9 26.5 - 33.0 pg    MCHC 33.2 31.5 - 36.5 g/dL    RDW 15.4 (H) 10.0 - 15.0 %    Platelet Count 272 150 - 450 10e3/uL    % Neutrophils 53 %    % Lymphocytes 39 %    % Monocytes 5 %    % Eosinophils 2 %    % Basophils 1 %    % Immature Granulocytes 0 %    NRBCs per 100 WBC 0 <1 /100    Absolute Neutrophils 2.3 1.6 - 8.3 10e3/uL    Absolute Lymphocytes 1.7 0.8 - 5.3 10e3/uL    Absolute Monocytes 0.2 0.0 - 1.3 10e3/uL    Absolute Eosinophils 0.1 0.0 - 0.7 10e3/uL    Absolute Basophils 0.0 0.0 - 0.2 10e3/uL    Absolute Immature Granulocytes 0.0 <=0.4 10e3/uL    Absolute NRBCs 0.0 10e3/uL   CT Head w/o Contrast    Narrative    PROCEDURE: CT HEAD W/O CONTRAST   11/17/2022 8:53 PM    HISTORY:Female, age,  82 years, , , lethargy, on coumadin    COMPARISON: CT scan of the brain  11/24/2017    TECHNIQUE: CT of the brain without contrast. Axial; sagittal and  coronal MIP images were reviewed.    FINDINGS: Ventricles and sulci are enlarged, similar in appearance  compared to prior study. Gray and white matter demonstrate scattered  areas of decreased density. Large focus of encephalomalacia is again  seen in the left parietal-occipital region    There is no evidence of mass, mass effect or midline shift. No  evidence of acute hemorrhage.    The bones are unremarkable. No fracture.       Impression    IMPRESSION:   No acute intracranial abnormality.  No acute fracture.     No acute change dating back to 11/24/2017.    White matter changes are similar in appearance suggesting small vessel  disease and an old left posterior cerebral and watershed infarct.    This facility minimizes radiation dose by adjusting the mA and/or kV  according to each patient size.      This CT scan was performed using one or more the following dose  reduction techniques:    -Automated exposure control,  -Adjustment of the mA and/or kV according to patient's size, and/or,  -Use of aortic reconstruction technique.      YAYO GARCIA MD         SYSTEM ID:  [id number]       Medications   amoxicillin-clavulanate (AUGMENTIN) 875-125 MG per tablet 1 tablet (1 tablet Oral Given 11/17/22 2200)       Assessments & Plan (with Medical Decision Making)     I have reviewed the nursing notes.    I have reviewed the findings, diagnosis, plan and need for follow up with the patient.    Current Discharge Medication List      START taking these medications    Details   amoxicillin-clavulanate (AUGMENTIN) 400-57 MG/5ML suspension Take 10.9 mLs (875 mg) by mouth 2 times daily for 7 days  Qty: 152.6 mL, Refills: 0             Final diagnoses:   Acute cystitis without hematuria   89 yo female, hx CVA, non verbal, with recurrent UTI.  Discussed with patient's daughter, no significant lab abnormalities other than clearly ongoing UTI.   Switched antibiotics to Augmentin x 7 days.  Upon recheck the patient was more awake and alert and appearing to be at her baseline.  Discharge back to facility.     11/17/2022   [facility]     Klever Bliss PA-C  11/17/22 [id number]

## 2022-11-18 NOTE — ED TRIAGE NOTES
Pt from [facility]. Has been lethargic over the past several several days and has not been eating much. Covid test today was negative. Pt on Coumadin. Vitals stable.

## 2022-11-20 LAB — BACTERIA UR CULT: ABNORMAL

## 2022-11-20 RX ORDER — GRANULES FOR ORAL 3 G/1
3 POWDER ORAL
Qty: 3 PACKET | Refills: 0 | Status: SHIPPED | OUTPATIENT
Start: 2022-11-20 | End: 2022-11-25

## 2022-11-20 NOTE — ED PROVIDER NOTES
Called number on file and was informed that no one was living there at that number (care facility).  Called the number listed for Anibal her spouse and that number is no longer in service.  Lab called and her urine culture cultured out ESBL.  Attempted to notify her to change her antibiotics as Augmentin is not listed in the EMRA 19 edition 2020 to treat ESBL.  Was seen in ER November 17, 2022     1400: Was able to reach [facility] and spoke with Yony.  Will stop Augmentin after starting fosfomycin 3 g every 48 hours for 3 doses to treat ESBL    Rachelle Reyna, CNP  11/20/22 1348       Rachelle Reyna, CNP  11/20/22 1401

## 2023-02-09 ENCOUNTER — MEDICAL CORRESPONDENCE (OUTPATIENT)
Dept: HEALTH INFORMATION MANAGEMENT | Facility: CLINIC | Age: 83
End: 2023-02-09

## 2023-03-20 VITALS
HEIGHT: 63 IN | HEART RATE: 82 BPM | TEMPERATURE: 97.8 F | DIASTOLIC BLOOD PRESSURE: 68 MMHG | OXYGEN SATURATION: 98 % | BODY MASS INDEX: 25.71 KG/M2 | WEIGHT: 145.1 LBS | RESPIRATION RATE: 15 BRPM | SYSTOLIC BLOOD PRESSURE: 110 MMHG

## 2023-03-20 PROBLEM — M79.604 RIGHT LEG PAIN: Status: ACTIVE | Noted: 2017-03-03

## 2023-03-20 PROBLEM — R53.81 PHYSICAL DECONDITIONING: Status: ACTIVE | Noted: 2017-04-03

## 2023-03-20 PROBLEM — R07.9 CHEST PAIN: Status: ACTIVE | Noted: 2017-03-03

## 2023-03-20 PROBLEM — R41.0 DELIRIUM: Status: ACTIVE | Noted: 2017-03-03

## 2023-03-20 PROBLEM — Z66 DNR (DO NOT RESUSCITATE): Status: ACTIVE | Noted: 2017-04-03

## 2023-03-23 ENCOUNTER — NURSING HOME VISIT (OUTPATIENT)
Dept: FAMILY MEDICINE | Facility: OTHER | Age: 83
End: 2023-03-23
Attending: FAMILY MEDICINE
Payer: MEDICARE

## 2023-03-23 DIAGNOSIS — G40.209 PARTIAL SYMPTOMATIC EPILEPSY WITH COMPLEX PARTIAL SEIZURES, NOT INTRACTABLE, WITHOUT STATUS EPILEPTICUS (H): ICD-10-CM

## 2023-03-23 DIAGNOSIS — I10 HYPERTENSION, UNSPECIFIED TYPE: ICD-10-CM

## 2023-03-23 DIAGNOSIS — I69.320 HEMIPARESIS, APHASIA, AND DYSPHAGIA AS LATE EFFECTS OF STROKE (H): ICD-10-CM

## 2023-03-23 DIAGNOSIS — F03.94 DEMENTIA WITH ANXIETY, UNSPECIFIED DEMENTIA SEVERITY, UNSPECIFIED DEMENTIA TYPE (H): ICD-10-CM

## 2023-03-23 DIAGNOSIS — I48.20 CHRONIC A-FIB (H): ICD-10-CM

## 2023-03-23 DIAGNOSIS — F41.9 ANXIETY: ICD-10-CM

## 2023-03-23 DIAGNOSIS — E78.5 DYSLIPIDEMIA: ICD-10-CM

## 2023-03-23 DIAGNOSIS — R13.10 DYSPHAGIA, UNSPECIFIED TYPE: ICD-10-CM

## 2023-03-23 DIAGNOSIS — I69.359 HEMIPARESIS, APHASIA, AND DYSPHAGIA AS LATE EFFECTS OF STROKE (H): ICD-10-CM

## 2023-03-23 DIAGNOSIS — I50.22 CHRONIC SYSTOLIC CONGESTIVE HEART FAILURE (H): ICD-10-CM

## 2023-03-23 DIAGNOSIS — I69.391 HEMIPARESIS, APHASIA, AND DYSPHAGIA AS LATE EFFECTS OF STROKE (H): ICD-10-CM

## 2023-03-23 DIAGNOSIS — I63.439 CEREBROVASCULAR ACCIDENT (CVA) DUE TO EMBOLISM OF POSTERIOR CEREBRAL ARTERY, UNSPECIFIED BLOOD VESSEL LATERALITY (H): Primary | ICD-10-CM

## 2023-03-23 PROCEDURE — 99304 1ST NF CARE SF/LOW MDM 25: CPT | Performed by: FAMILY MEDICINE

## 2023-03-23 NOTE — PROGRESS NOTES
HISTORY OF PRESENT ILLNESS:      Danette is a 82 year old female (1940)  resident of [facility] who is being seen today for an initial visit.     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     Discussed with nursing staff who have the following concerns: None. Pt is LTC here, has been here over two years.      Patient is seen in their room. Family is present, daughter. Pt is resting, rouses only briefly with stimulation and falls back asleep.     Per daughter, pt has sleepy days like this intermittently. Can be more alert. Appears to comprehend well, but definitely has expressive deficits since her stroke. She does not ambulate. Does eat with assist of staff with dysphagia diet. No recent seizures. Anxiety well controlled, of note this worsened severely when risperdal was stopped, so added back. Tolerating low dose.     Current medications, allergies, and interdisciplinary care plan are reviewed.    Patient Active Problem List    Diagnosis Date Noted     Hospice care patient 01/07/2018     Priority: Medium     DNR (do not resuscitate) 04/03/2017     Priority: Medium     Physical deconditioning 04/03/2017     Priority: Medium     Delirium 03/03/2017     Priority: Medium     Chest pain 03/03/2017     Priority: Medium     Right leg pain 03/03/2017     Priority: Medium     Generalized weakness 11/01/2015     Priority: Medium     Hemiparesis and speech and language deficit as late effects of stroke (H) 11/01/2015     Priority: Medium     Orthostatic hypotension 05/18/2015     Priority: Medium     Anxiety 05/14/2015     Priority: Medium     Embolic stroke involving posterior cerebral artery (H) 05/11/2015     Priority: Medium     Complex partial seizure (H) 05/11/2015     Priority: Medium     TIA (transient ischemic attack) 05/10/2015     Priority: Medium     Dyslipidemia 11/03/2014     Priority: Medium     Hemiparesis, aphasia, and dysphagia as  late effects of stroke (H) 11/03/2014     Priority: Medium     Hypertension 11/03/2014     Priority: Medium     Aphasia 09/16/2014     Priority: Medium     Apraxia 09/16/2014     Priority: Medium     Chronic a-fib (H) 09/16/2014     Priority: Medium     Dysphagia 09/16/2014     Priority: Medium     Hemineglect 09/16/2014     Priority: Medium     Orthostatic tremor 09/16/2014     Priority: Medium     Right hemiparesis (H) 09/16/2014     Priority: Medium     Hypokalemia 09/09/2014     Priority: Medium     Health care directive on file 10/31/2008     Priority: Medium          Social History     Socioeconomic History     Marital status:      Spouse name: Not on file     Number of children: Not on file     Years of education: Not on file     Highest education level: Not on file   Occupational History     Not on file   Tobacco Use     Smoking status: Unknown     Smokeless tobacco: Not on file   Substance and Sexual Activity     Alcohol use: Not Currently     Drug use: Never     Sexual activity: Not Currently   Other Topics Concern     Not on file   Social History Narrative     Not on file     Social Determinants of Health     Financial Resource Strain: Not on file   Food Insecurity: Not on file   Transportation Needs: Not on file   Physical Activity: Not on file   Stress: Not on file   Social Connections: Not on file   Intimate Partner Violence: Not on file   Housing Stability: Not on file        Current Outpatient Medications   Medication Sig     Acetaminophen (TYLENOL PO) Take 1,000 mg by mouth 2 times daily     ACETAMINOPHEN PO Take 650 mg by mouth every 6 hours as needed for pain     Atorvastatin Calcium (LIPITOR PO) Take 40 mg by mouth daily     benzocaine (ORAJEL MAXIMUM STRENGTH) 20 % GEL gel Take by mouth 4 times daily as needed for moderate pain     bisacodyl (DULCOLAX) 10 MG Suppository Place 10 mg rectally PRN every 4 days     bismuth subsalicylate (PEPTO BISMOL) 262 MG/15ML suspension Take 30 mLs by  "mouth every 4 hours as needed for indigestion     diltiazem ER COATED BEADS (CARDIZEM CD/CARTIA XT) 120 MG 24 hr capsule Take 120 mg by mouth daily     ketoconazole (NIZORAL) 2 % external shampoo Twice weekly     magnesium hydroxide (MILK OF MAGNESIA) 400 MG/5ML suspension Take 30 mLs by mouth daily as needed PRN     metoprolol tartrate (LOPRESSOR) 25 MG tablet Take 1 tablet (25 mg) by mouth 2 times daily     RisperiDONE (RISPERDAL PO) Take 0.125 mg by mouth daily     senna-docusate (SENOKOT-S;PERICOLACE) 8.6-50 MG per tablet Take 2 tablets by mouth daily     SERTRALINE HCL PO Take 50 mg by mouth daily     Vitamin D, Cholecalciferol, 1000 units CAPS Take 2,000 Units by mouth daily (Patient taking differently: Take 1,000 Units by mouth daily)     Warfarin Sodium (COUMADIN PO) Take 2.5 mg by mouth Wed, Tue, Thu, Fri, Sat, Sun     Warfarin Sodium (COUMADIN PO) Take 5 mg by mouth Mon,     No current facility-administered medications for this visit.       Allergies   Allergen Reactions     Bacitracin Rash     Contact dermatitis       I have reviewed the care plan and do agree with the plan.    ROS:  Unable to obtain due to pt factors.     OBJECTIVE:  /68   Pulse 82   Temp 97.8  F (36.6  C)   Resp 15   Ht 1.6 m (5' 3\")   Wt 65.8 kg (145 lb 1.6 oz)   SpO2 98%   BMI 25.70 kg/m      GENERAL:  Resting,  in no acute distress  RESP:  Lungs clear.  No rales, rhonchi, or wheezing  CV:  RRR.  S1 S2 with our murmur. No clicks or rubs.  ABD: Soft, non tender, non distended, no organomegaly. BS are normal.   SKIN:  Age-related changes.  No suspicious lesions or rashes.  PSYCH:  Affect is fatigued  NEURO: Mental status is somnolent.   EXTREM:  No edema, compression hose on. .    Lab/Diagnostic data:    Reviewed in Epic    ASSESSMENT/ORDERS:  Danette was seen today for new admit.    Diagnoses and all orders for this visit:    Cerebrovascular accident (CVA) due to embolism of posterior cerebral artery, unspecified blood " vessel laterality (H) . Hemiparesis, aphasia, and dysphagia as late effects of stroke (H) / Dysphagia, unspecified type  Pt severely disabled after stroke in 2017. Expressive aphasia, dysphagia and cog impairment are stable. She is non ambulatory, does speak in short sentences/phrases per family. Weight and appetite stable.     Chronic a-fib (H)   On warfarin. No s/s of bleeding.     Partial symptomatic epilepsy with complex partial seizures, not intractable, without status epilepticus (H)    Hypertension, unspecified type  Controlled    Dementia with anxiety, unspecified dementia severity, unspecified dementia type / Anxiety  Mood/affect under good control per family.     Dyslipidemia  Continue statin in light of CVA, tolerating.     Chronic systolic congestive heart failure (H)  Tolerating bblker, edema minimal today. Monitor. EF 40% in 2018.       Total time spent with patient visit was 25 min including patient visit, review of pertinent clinical information, and treatment plan.    Selena Townsend MD

## 2023-04-07 ENCOUNTER — TELEPHONE (OUTPATIENT)
Dept: FAMILY MEDICINE | Facility: OTHER | Age: 83
End: 2023-04-07

## 2023-04-07 ENCOUNTER — TRANSFERRED RECORDS (OUTPATIENT)
Dept: HEALTH INFORMATION MANAGEMENT | Facility: OTHER | Age: 83
End: 2023-04-07

## 2023-04-07 ENCOUNTER — ANTICOAGULATION THERAPY VISIT (OUTPATIENT)
Dept: ANTICOAGULATION | Facility: OTHER | Age: 83
End: 2023-04-07
Attending: FAMILY MEDICINE
Payer: MEDICARE

## 2023-04-07 DIAGNOSIS — I48.20 CHRONIC A-FIB (H): ICD-10-CM

## 2023-04-07 DIAGNOSIS — I48.20 CHRONIC A-FIB (H): Primary | ICD-10-CM

## 2023-04-07 DIAGNOSIS — Z79.01 ANTICOAGULATION MONITORING, INR RANGE 2-3: Primary | ICD-10-CM

## 2023-04-07 DIAGNOSIS — Z79.01 ANTICOAGULATION MONITORING, INR RANGE 2-3: ICD-10-CM

## 2023-04-07 LAB — INR (EXTERNAL): 1.4 (ref 0.9–1.1)

## 2023-04-07 NOTE — PROGRESS NOTES
ANTICOAGULATION MANAGEMENT     Danette Ramos 82 year old female is on warfarin with subtherapeutic INR result. (Goal INR 2.0-3.0)    Recent labs: (last 7 days)     04/07/23  1131   INR 1.4*       ASSESSMENT       Source(s): Chart Review and Home Care/Facility Nurse       Warfarin doses taken: Warfarin taken as instructed    Diet: No new diet changes identified    New illness, injury, or hospitalization: No    Medication/supplement changes: None noted    Signs or symptoms of bleeding or clotting: No    Previous INR: Therapeutic last 2(+) visits    Additional findings: new to [facility] group was previously managed by [facility]'s          PLAN     Recommended plan for no diet, medication or health factor changes affecting INR     Dosing Instructions: booster dose then continue your current warfarin dose with next INR in 2 weeks       Summary  As of 4/7/2023    Full warfarin instructions:  4/7: 5 mg; Otherwise 5 mg every Mon; 2.5 mg all other days   Next INR check:  4/21/2023             Faxed instructions to [facility]    Orders given to  Homecare nurse/facility to recheck    Education provided:     Contact [phone] with any changes, questions or concerns.     Plan made per [facility] anticoagulation protocol    Ese Beckett RN  Anticoagulation Clinic  4/7/2023    _______________________________________________________________________     Anticoagulation Episode Summary     Current INR goal:  2.0-3.0   TTR:  --   Target end date:  Indefinite   Send INR reminders to:  [facility]    Indications    Anticoagulation monitoring  INR range 2-3 [Z79.01]  Chronic a-fib (H) [I48.20]           Comments:  Lives at [facility] ph [phone] fax [phone]         Anticoagulation Care Providers     Provider Role Specialty Phone number    Isabell Bradley MD Referring Family Medicine [phone]

## 2023-04-07 NOTE — TELEPHONE ENCOUNTER
In clinical absence of patient's primary, we are requesting that this message be sent to the covering provider for consideration please. Patients INR is being sent from [facility] for us to manage since patient has already established care with Dr Townsend 3/23/23.    Dr Townsend,    Your patient, Danette Ramos, is under the care of [facility]. Previously referred by [facility] dept.  Anticoagulation clinic needing a new referring provider due to patient establishing care with [facility]     Indication(s): Atrial Fibrillation   Goal Range: 2.0-3.0  Duration: indefinite     Anticoagulation clinic requires a referral from one of Danette's [facility] ambulatory provider (PCP or specialist) in order to provide anticoagulation management. The referring provider should anticipate seeing the patient on an ongoing basis, will have INRs and warfarin Rx ordered under their name per protocol, and will be point of contact for [facility] questions on patient's anticoagulation care (procedural holds, critical results, etc).     Please review and sign the pended referral order for Danette Ramos if you are willing to oversee anticoagulation care.         Thank you,  Ese Beckett, RN  Anticoagulation clinic

## 2023-04-21 ENCOUNTER — ANTICOAGULATION THERAPY VISIT (OUTPATIENT)
Dept: ANTICOAGULATION | Facility: OTHER | Age: 83
End: 2023-04-21
Attending: FAMILY MEDICINE
Payer: MEDICARE

## 2023-04-21 ENCOUNTER — TRANSFERRED RECORDS (OUTPATIENT)
Dept: HEALTH INFORMATION MANAGEMENT | Facility: OTHER | Age: 83
End: 2023-04-21

## 2023-04-21 DIAGNOSIS — Z79.01 ANTICOAGULATION MONITORING, INR RANGE 2-3: Primary | ICD-10-CM

## 2023-04-21 DIAGNOSIS — I48.20 CHRONIC A-FIB (H): ICD-10-CM

## 2023-04-21 LAB — INR (EXTERNAL): 1.8 (ref 0.9–1.1)

## 2023-04-21 NOTE — PROGRESS NOTES
ANTICOAGULATION MANAGEMENT     Danette Ramos 82 year old female is on warfarin with subtherapeutic INR result. (Goal INR 2.0-3.0)    Recent labs: (last 7 days)     04/21/23  1122   INR 1.8*       ASSESSMENT       Source(s): Chart Review and Home Care/Facility Nurse       Warfarin doses taken: Warfarin taken as instructed    Diet: No new diet changes identified    Medication/supplement changes: None noted    New illness, injury, or hospitalization: No    Signs or symptoms of bleeding or clotting: No    Previous INR: Subtherapeutic    Additional findings: None         PLAN     Recommended plan for no diet, medication or health factor changes affecting INR     Dosing Instructions: Continue your current warfarin dose with next INR in 2 weeks       Summary  As of 4/21/2023    Full warfarin instructions:  5 mg every Mon; 2.5 mg all other days   Next INR check:  5/5/2023             Faxed dosing and follow up instructions to [facility]    Orders given to  Homecare nurse/facility to recheck    Patient not here, [employer]heet with screening questions and current INR received via FAX from outside agency. Results reviewed, Warfarin dosing per protocol, and recommended follow-up appointment made. Paperwork FAXED back to facility.     Plan made per [facility] anticoagulation protocol    Ese Beckett RN  Anticoagulation Clinic  4/21/2023    _______________________________________________________________________     Anticoagulation Episode Summary     Current INR goal:  2.0-3.0   TTR:  0.0 % (4 d)   Target end date:  Indefinite   Send INR reminders to:  [facility]    Indications    Anticoagulation monitoring  INR range 2-3 [Z79.01]  Chronic a-fib (H) [I48.20]           Comments:  Lives at [facility] ph [phone] fax [phone]. They will make appt for next INR         Anticoagulation Care Providers     Provider Role Specialty Phone number    Isabell Bradley MD Referring Family Medicine  [phone]

## 2023-04-26 VITALS
BODY MASS INDEX: 26.21 KG/M2 | RESPIRATION RATE: 17 BRPM | WEIGHT: 147.9 LBS | HEIGHT: 63 IN | TEMPERATURE: 97 F | DIASTOLIC BLOOD PRESSURE: 65 MMHG | OXYGEN SATURATION: 94 % | HEART RATE: 61 BPM | SYSTOLIC BLOOD PRESSURE: 97 MMHG

## 2023-04-26 RX ORDER — NYSTATIN 100000 [USP'U]/G
POWDER TOPICAL PRN
COMMUNITY
End: 2023-09-06

## 2023-04-28 ENCOUNTER — NURSING HOME VISIT (OUTPATIENT)
Dept: FAMILY MEDICINE | Facility: OTHER | Age: 83
End: 2023-04-28
Payer: MEDICARE

## 2023-04-28 DIAGNOSIS — F03.94 DEMENTIA WITH ANXIETY, UNSPECIFIED DEMENTIA SEVERITY, UNSPECIFIED DEMENTIA TYPE (H): ICD-10-CM

## 2023-04-28 DIAGNOSIS — I10 HYPERTENSION, UNSPECIFIED TYPE: ICD-10-CM

## 2023-04-28 DIAGNOSIS — I48.20 CHRONIC A-FIB (H): ICD-10-CM

## 2023-04-28 DIAGNOSIS — I50.22 CHRONIC SYSTOLIC CONGESTIVE HEART FAILURE (H): ICD-10-CM

## 2023-04-28 DIAGNOSIS — Z78.9 NURSING HOME RESIDENT: Primary | ICD-10-CM

## 2023-04-28 DIAGNOSIS — E78.5 DYSLIPIDEMIA: ICD-10-CM

## 2023-04-28 DIAGNOSIS — Z79.01 ANTICOAGULATION MONITORING, INR RANGE 2-3: ICD-10-CM

## 2023-04-28 PROCEDURE — 99309 SBSQ NF CARE MODERATE MDM 30: CPT

## 2023-04-28 NOTE — PROGRESS NOTES
"  HISTORY OF PRESENT ILLNESS:      Danette is a 82 year old female (1940)  resident of [facility] who is being seen today for an initial visit.     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     Discussed with nursing staff who have the following concerns: None. Pt is LTC here, has been here over two years.      Patient is seen in their room. Family is not present. Patient is resting in her wheelchair. Says \"yes\" or mumbles to my questions. Appears comfortable. Does squeeze my hands when asked. At the end of our visit she closes her eyes to sleep.    Anxiety well controlled, of note this worsened severely when risperdal was stopped. This has been added back and is tolerating well.     Current medications, allergies, and interdisciplinary care plan are reviewed.    Patient Active Problem List    Diagnosis Date Noted     Anticoagulation monitoring, INR range 2-3 04/07/2023     Priority: Medium     Hospice care patient 01/07/2018     Priority: Medium     DNR (do not resuscitate) 04/03/2017     Priority: Medium     Physical deconditioning 04/03/2017     Priority: Medium     Delirium 03/03/2017     Priority: Medium     Chest pain 03/03/2017     Priority: Medium     Right leg pain 03/03/2017     Priority: Medium     Generalized weakness 11/01/2015     Priority: Medium     Hemiparesis and speech and language deficit as late effects of stroke (H) 11/01/2015     Priority: Medium     Orthostatic hypotension 05/18/2015     Priority: Medium     Anxiety 05/14/2015     Priority: Medium     Embolic stroke involving posterior cerebral artery (H) 05/11/2015     Priority: Medium     Complex partial seizure (H) 05/11/2015     Priority: Medium     TIA (transient ischemic attack) 05/10/2015     Priority: Medium     Dyslipidemia 11/03/2014     Priority: Medium     Hemiparesis, aphasia, and dysphagia as late effects of stroke (H) 11/03/2014     Priority: Medium     " Hypertension 11/03/2014     Priority: Medium     Aphasia 09/16/2014     Priority: Medium     Apraxia 09/16/2014     Priority: Medium     Chronic a-fib (H) 09/16/2014     Priority: Medium     Dysphagia 09/16/2014     Priority: Medium     Hemineglect 09/16/2014     Priority: Medium     Orthostatic tremor 09/16/2014     Priority: Medium     Right hemiparesis (H) 09/16/2014     Priority: Medium     Hypokalemia 09/09/2014     Priority: Medium     Health care directive on file 10/31/2008     Priority: Medium          Social History     Socioeconomic History     Marital status:      Spouse name: Not on file     Number of children: Not on file     Years of education: Not on file     Highest education level: Not on file   Occupational History     Not on file   Tobacco Use     Smoking status: Unknown     Smokeless tobacco: Not on file   Vaping Use     Vaping status: Not on file   Substance and Sexual Activity     Alcohol use: Not Currently     Drug use: Never     Sexual activity: Not Currently   Other Topics Concern     Not on file   Social History Narrative     Not on file     Social Determinants of Health     Financial Resource Strain: Not on file   Food Insecurity: Not on file   Transportation Needs: Not on file   Physical Activity: Not on file   Stress: Not on file   Social Connections: Not on file   Intimate Partner Violence: Not on file   Housing Stability: Not on file        Current Outpatient Medications   Medication Sig     Acetaminophen (TYLENOL PO) Take 1,000 mg by mouth 2 times daily     ACETAMINOPHEN PO Take 650 mg by mouth every 6 hours as needed for pain     Atorvastatin Calcium (LIPITOR PO) Take 40 mg by mouth daily     benzocaine (ORAJEL MAXIMUM STRENGTH) 20 % GEL gel Take by mouth 4 times daily as needed for moderate pain     bisacodyl (DULCOLAX) 10 MG Suppository Place 10 mg rectally PRN every 4 days     bismuth subsalicylate (PEPTO BISMOL) 262 MG/15ML suspension Take 30 mLs by mouth every 4 hours as  "needed for indigestion     diltiazem ER COATED BEADS (CARDIZEM CD/CARTIA XT) 120 MG 24 hr capsule Take 120 mg by mouth daily     ketoconazole (NIZORAL) 2 % external shampoo Twice weekly     LEVETIRACETAM PO Take 7.5 mLs by mouth 2 times daily     magnesium hydroxide (MILK OF MAGNESIA) 400 MG/5ML suspension Take 30 mLs by mouth daily as needed PRN     metoprolol tartrate (LOPRESSOR) 25 MG tablet Take 1 tablet (25 mg) by mouth 2 times daily     nystatin (MYCOSTATIN) 671546 UNIT/GM external powder Apply topically as needed     RisperiDONE (RISPERDAL PO) Take 0.125 mg by mouth daily     senna-docusate (SENOKOT-S;PERICOLACE) 8.6-50 MG per tablet Take 2 tablets by mouth daily     SERTRALINE HCL PO Take 50 mg by mouth daily     Vitamin D, Cholecalciferol, 1000 units CAPS Take 2,000 Units by mouth daily (Patient taking differently: Take 1,000 Units by mouth daily)     Warfarin Sodium (COUMADIN PO) Take 2.5 mg by mouth Wed, Tue, Thu, Fri, Sat, Sun     Warfarin Sodium (COUMADIN PO) Take 5 mg by mouth Mon,     No current facility-administered medications for this visit.       Allergies   Allergen Reactions     Bacitracin Rash     Contact dermatitis       I have reviewed the care plan and do agree with the plan.    ROS:  Unable to obtain due to pt factors.     OBJECTIVE:  BP 97/65   Pulse 61   Temp 97  F (36.1  C)   Resp 17   Ht 1.6 m (5' 3\")   Wt 67.1 kg (147 lb 14.4 oz)   SpO2 94%   BMI 26.20 kg/m      GENERAL:  Resting,  in no acute distress  RESP:  Lungs clear.  No rales, rhonchi, or wheezing  CV:  RRR.  S1 S2 with no murmur. No clicks or rubs.  ABD: Soft, non tender, non distended, no organomegaly. BS are normal.   SKIN:  Age-related changes.  No suspicious lesions or rashes.  PSYCH:  Affect is fatigued.  NEURO: Mental status is somnolent.   EXTREM:  No edema, compression hose on. .    Lab/Diagnostic data:    Reviewed in Epic    ASSESSMENT/ORDERS:  Danette was seen today for new admit.    Diagnoses and all orders for " this visit:    Cerebrovascular accident (CVA) due to embolism of posterior cerebral artery, unspecified blood vessel laterality (H) . Hemiparesis, aphasia, and dysphagia as late effects of stroke (H) / Dysphagia, unspecified type  Pt severely disabled after stroke in 2017. Expressive aphasia, dysphagia and cog impairment are stable. She is non ambulatory, does speak in short sentences/phrases per family. Weight 146 lbs, this is stable over last 4-5 months. Previously appears she was low 150s. Monitor for further weight loss.     Chronic a-fib (H)   On warfarin. No s/s of bleeding. Continue CCB, BB. Rate controlled.     Partial symptomatic epilepsy with complex partial seizures, not intractable, without status epilepticus (H)  No seizure activity. On keppra. Level wnl in Feb.    Hypertension, unspecified type  Generally controlled- 's. Monitor for lows.     Dementia with anxiety, unspecified dementia severity, unspecified dementia type / Anxiety  Mood/affect under good control per family. Continue risperdal at current dosage, as taper resulted in significant worsening of anxiety.    Dyslipidemia  Continue statin given history of CVA.     Chronic systolic congestive heart failure (H)  Not on diuretics. Currently on metoprolol 25 mg. Euvolemic. Monitor. EF 40% in 2018.       Total time spent with patient visit was 30 min including patient visit, review of pertinent clinical information, and treatment plan.    Janeth Larkin, CNP

## 2023-05-05 ENCOUNTER — ANTICOAGULATION THERAPY VISIT (OUTPATIENT)
Dept: ANTICOAGULATION | Facility: OTHER | Age: 83
End: 2023-05-05
Attending: FAMILY MEDICINE
Payer: MEDICARE

## 2023-05-05 ENCOUNTER — TRANSFERRED RECORDS (OUTPATIENT)
Dept: HEALTH INFORMATION MANAGEMENT | Facility: OTHER | Age: 83
End: 2023-05-05

## 2023-05-05 DIAGNOSIS — Z79.01 ANTICOAGULATION MONITORING, INR RANGE 2-3: Primary | ICD-10-CM

## 2023-05-05 DIAGNOSIS — I48.20 CHRONIC A-FIB (H): ICD-10-CM

## 2023-05-05 LAB — INR (EXTERNAL): 1.7 (ref 0.9–1.1)

## 2023-05-05 NOTE — PROGRESS NOTES
ANTICOAGULATION MANAGEMENT     Danette Ramos 82 year old female is on warfarin with subtherapeutic INR result. (Goal INR 2.0-3.0)    Recent labs: (last 7 days)     05/05/23  1216   INR 1.7*       ASSESSMENT       Source(s): Chart Review and Home Care/Facility Nurse       Warfarin doses taken: Warfarin taken as instructed    Diet: No new diet changes identified    Medication/supplement changes: None noted    New illness, injury, or hospitalization: No    Signs or symptoms of bleeding or clotting: No    Previous result: Subtherapeutic    Additional findings: None         PLAN     Recommended plan for no diet, medication or health factor changes affecting INR     Dosing Instructions: Increase your warfarin dose (12.5% change) with next INR in 2 weeks       Summary  As of 5/5/2023    Full warfarin instructions:  5 mg every Mon, Fri; 2.5 mg all other days   Next INR check:  5/19/2023             Faxed dosing and follow up instructions to [facility]    Orders given to  Homecare nurse/facility to recheck    Patient not here, [employer]heet with screening questions and current INR received via FAX from outside agency. Results reviewed, Warfarin dosing per protocol, and recommended follow-up appointment made. Paperwork FAXED back to facility.       Plan made per [facility] anticoagulation protocol    Ese Beckett RN  Anticoagulation Clinic  5/5/2023    _______________________________________________________________________     Anticoagulation Episode Summary     Current INR goal:  2.0-3.0   TTR:  0.0 % (2.6 wk)   Target end date:  Indefinite   Send INR reminders to:  [facility] [place]    Indications    Anticoagulation monitoring  INR range 2-3 [Z79.01]  Chronic a-fib (H) [I48.20]           Comments:  Lives at [facility] [phone] fax [phone].         Anticoagulation Care Providers     Provider Role Specialty Phone number    Isabell Bradley MD Referring Family Medicine [phone]

## 2023-05-19 ENCOUNTER — ANTICOAGULATION THERAPY VISIT (OUTPATIENT)
Dept: ANTICOAGULATION | Facility: OTHER | Age: 83
End: 2023-05-19
Attending: FAMILY MEDICINE
Payer: MEDICARE

## 2023-05-19 ENCOUNTER — TRANSFERRED RECORDS (OUTPATIENT)
Dept: HEALTH INFORMATION MANAGEMENT | Facility: OTHER | Age: 83
End: 2023-05-19

## 2023-05-19 DIAGNOSIS — Z79.01 ANTICOAGULATION MONITORING, INR RANGE 2-3: Primary | ICD-10-CM

## 2023-05-19 DIAGNOSIS — I48.20 CHRONIC A-FIB (H): ICD-10-CM

## 2023-05-19 LAB — INR (EXTERNAL): 2.2 (ref 0.9–1.1)

## 2023-05-19 NOTE — PROGRESS NOTES
ANTICOAGULATION MANAGEMENT     Danette Ramos 82 year old female is on warfarin with therapeutic INR result. (Goal INR 2.0-3.0)    Recent labs: (last 7 days)     05/19/23  0600   INR 2.2*       ASSESSMENT       Source(s): Chart Review and Home Care/Facility Nurse fax       Warfarin doses taken: Warfarin taken as instructed    Diet: No new diet changes identified    New illness, injury, or hospitalization: No    Medication/supplement changes: None noted    Signs or symptoms of bleeding or clotting: No    Previous INR: Subtherapeutic    Additional findings: None       PLAN     Recommended plan for no diet, medication or health factor changes affecting INR     Dosing Instructions: Continue your current warfarin dose with next INR in 2 weeks       Summary  As of 5/19/2023    Full warfarin instructions:  5 mg every Mon, Fri; 2.5 mg all other days   Next INR check:  6/2/2023             Faxed dosing and follow up instructions to [facility] Nurse MIREILLE Fletcher    Orders given to  Homecare nurse/facility to recheck    Patient not here, [employer]heet with screening questions and current INR received via FAX from outside agency. Results reviewed, Warfarin dosing per protocol, and recommended follow-up appointment made. Paperwork FAXED back to facility    Plan made per [facility] anticoagulation protocol    Julee Flynn RN  Anticoagulation Clinic  5/19/2023    _______________________________________________________________________     Anticoagulation Episode Summary     Current INR goal:  2.0-3.0   TTR:  17.0 % (1.1 mo)   Target end date:  Indefinite   Send INR reminders to:  [facility] [place]    Indications    Anticoagulation monitoring  INR range 2-3 [Z79.01]  Chronic a-fib (H) [I48.20]           Comments:  Lives at [facility] [phone] fax [phone].         Anticoagulation Care Providers     Provider Role Specialty Phone number    Isabell Bradley MD Referring Family Medicine [phone]

## 2023-06-02 ENCOUNTER — ANTICOAGULATION THERAPY VISIT (OUTPATIENT)
Dept: ANTICOAGULATION | Facility: OTHER | Age: 83
End: 2023-06-02
Attending: FAMILY MEDICINE
Payer: MEDICARE

## 2023-06-02 ENCOUNTER — TRANSFERRED RECORDS (OUTPATIENT)
Dept: HEALTH INFORMATION MANAGEMENT | Facility: OTHER | Age: 83
End: 2023-06-02

## 2023-06-02 DIAGNOSIS — Z79.01 ANTICOAGULATION MONITORING, INR RANGE 2-3: Primary | ICD-10-CM

## 2023-06-02 DIAGNOSIS — I48.20 CHRONIC A-FIB (H): ICD-10-CM

## 2023-06-02 LAB — INR (EXTERNAL): 2.1 (ref 0.9–1.1)

## 2023-06-02 NOTE — PROGRESS NOTES
ANTICOAGULATION MANAGEMENT     Danette Ramos 82 year old female is on warfarin with therapeutic INR result. (Goal INR 2.0-3.0)    Recent labs: (last 7 days)     06/02/23  1233   INR 2.1*       ASSESSMENT       Source(s): Chart Review and Home Care/Facility Nurse       Warfarin doses taken: Warfarin taken as instructed    Diet: No new diet changes identified    Medication/supplement changes: None noted    New illness, injury, or hospitalization: No    Signs or symptoms of bleeding or clotting: No    Previous result: Therapeutic last visit; previously outside of goal range    Additional findings: None         PLAN     Recommended plan for no diet, medication or health factor changes affecting INR     Dosing Instructions: Continue your current warfarin dose with next INR in 4 weeks       Summary  As of 6/2/2023    Full warfarin instructions:  5 mg every Mon, Fri; 2.5 mg all other days   Next INR check:  6/30/2023             Faxed dosing and follow up instructions to [facility]    Orders given to  Homecare nurse/facility to recheck    Patient not here, [employer] Communicationsheet with screening questions and current INR received via FAX from outside agency. Results reviewed, Warfarin dosing per protocol, and recommended follow-up appointment made. Paperwork FAXED back to facility.       Plan made per [facility] anticoagulation protocol    Ese Beckett RN  Anticoagulation Clinic  6/2/2023    _______________________________________________________________________     Anticoagulation Episode Summary     Current INR goal:  2.0-3.0   TTR:  42.5 % (1.5 mo)   Target end date:  Indefinite   Send INR reminders to:  [facility] [place]    Indications    Anticoagulation monitoring  INR range 2-3 [Z79.01]  Chronic a-fib (H) [I48.20]           Comments:  Lives at [facility] ph [phone] fax [phone].         Anticoagulation Care Providers     Provider Role Specialty Phone number    Isabell Bradley MD Referring Family  Medicine [phone]

## 2023-06-07 VITALS
HEIGHT: 63 IN | DIASTOLIC BLOOD PRESSURE: 58 MMHG | BODY MASS INDEX: 25.89 KG/M2 | SYSTOLIC BLOOD PRESSURE: 121 MMHG | TEMPERATURE: 97.2 F | WEIGHT: 146.1 LBS | OXYGEN SATURATION: 99 % | HEART RATE: 84 BPM | RESPIRATION RATE: 16 BRPM

## 2023-06-08 NOTE — PROGRESS NOTES
HISTORY OF PRESENT ILLNESS:      Danette is a 82 year old female (1940)  resident of [facility] who is being seen today for a routine follow-up visit.     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     Discussed with nursing staff who have the following concerns: None. Reviewed progress notes without concerns.     Patient is seen in their room. Family is not present. Patient is resting in her wheelchair and is going to get transferred out of her chair back to bed. Stares at this writer and mumbles to questions.       Current medications, allergies, and interdisciplinary care plan are reviewed.    Patient Active Problem List    Diagnosis Date Noted     Anticoagulation monitoring, INR range 2-3 04/07/2023     Priority: Medium     Hospice care patient 01/07/2018     Priority: Medium     DNR (do not resuscitate) 04/03/2017     Priority: Medium     Physical deconditioning 04/03/2017     Priority: Medium     Delirium 03/03/2017     Priority: Medium     Chest pain 03/03/2017     Priority: Medium     Right leg pain 03/03/2017     Priority: Medium     Generalized weakness 11/01/2015     Priority: Medium     Hemiparesis and speech and language deficit as late effects of stroke (H) 11/01/2015     Priority: Medium     Orthostatic hypotension 05/18/2015     Priority: Medium     Anxiety 05/14/2015     Priority: Medium     Embolic stroke involving posterior cerebral artery (H) 05/11/2015     Priority: Medium     Complex partial seizure (H) 05/11/2015     Priority: Medium     TIA (transient ischemic attack) 05/10/2015     Priority: Medium     Dyslipidemia 11/03/2014     Priority: Medium     Hemiparesis, aphasia, and dysphagia as late effects of stroke (H) 11/03/2014     Priority: Medium     Hypertension 11/03/2014     Priority: Medium     Aphasia 09/16/2014     Priority: Medium     Apraxia 09/16/2014     Priority: Medium     Chronic a-fib (H) 09/16/2014      Priority: Medium     Dysphagia 09/16/2014     Priority: Medium     Hemineglect 09/16/2014     Priority: Medium     Orthostatic tremor 09/16/2014     Priority: Medium     Right hemiparesis (H) 09/16/2014     Priority: Medium     Hypokalemia 09/09/2014     Priority: Medium     Health care directive on file 10/31/2008     Priority: Medium          Social History     Socioeconomic History     Marital status:      Spouse name: Not on file     Number of children: Not on file     Years of education: Not on file     Highest education level: Not on file   Occupational History     Not on file   Tobacco Use     Smoking status: Unknown     Smokeless tobacco: Not on file   Vaping Use     Vaping status: Not on file   Substance and Sexual Activity     Alcohol use: Not Currently     Drug use: Never     Sexual activity: Not Currently   Other Topics Concern     Not on file   Social History Narrative     Not on file     Social Determinants of Health     Financial Resource Strain: Not on file   Food Insecurity: Not on file   Transportation Needs: Not on file   Physical Activity: Not on file   Stress: Not on file   Social Connections: Not on file   Intimate Partner Violence: Not on file   Housing Stability: Not on file        Current Outpatient Medications   Medication Sig     Acetaminophen (TYLENOL PO) Take 1,000 mg by mouth 2 times daily     ACETAMINOPHEN PO Take 650 mg by mouth every 6 hours as needed for pain     Atorvastatin Calcium (LIPITOR PO) Take 40 mg by mouth daily     benzocaine (ORAJEL MAXIMUM STRENGTH) 20 % GEL gel Take by mouth 4 times daily as needed for moderate pain     bisacodyl (DULCOLAX) 10 MG Suppository Place 10 mg rectally PRN every 4 days     bismuth subsalicylate (PEPTO BISMOL) 262 MG/15ML suspension Take 30 mLs by mouth every 4 hours as needed for indigestion     diltiazem ER COATED BEADS (CARDIZEM CD/CARTIA XT) 120 MG 24 hr capsule Take 120 mg by mouth daily     ketoconazole (NIZORAL) 2 % external  "shampoo Twice weekly     LEVETIRACETAM PO Take 7.5 mLs by mouth 2 times daily     magnesium hydroxide (MILK OF MAGNESIA) 400 MG/5ML suspension Take 30 mLs by mouth daily as needed PRN     metoprolol tartrate (LOPRESSOR) 25 MG tablet Take 1 tablet (25 mg) by mouth 2 times daily     nystatin (MYCOSTATIN) 948206 UNIT/GM external powder Apply topically as needed     RisperiDONE (RISPERDAL PO) Take 0.125 mg by mouth daily     senna-docusate (SENOKOT-S;PERICOLACE) 8.6-50 MG per tablet Take 2 tablets by mouth daily     SERTRALINE HCL PO Take 50 mg by mouth daily     Vitamin D, Cholecalciferol, 1000 units CAPS Take 2,000 Units by mouth daily (Patient taking differently: Take 1,000 Units by mouth daily)     Warfarin Sodium (COUMADIN PO) Take 2.5 mg by mouth Wed, Tue, Thu, Sat, Sun     Warfarin Sodium (COUMADIN PO) Take 5 mg by mouth Mon,Fri     No current facility-administered medications for this visit.       Allergies   Allergen Reactions     Bacitracin Rash     Contact dermatitis       I have reviewed the care plan and do agree with the plan.    ROS:  Unable to obtain due to pt factors.     OBJECTIVE:  /58   Pulse 84   Temp 97.2  F (36.2  C)   Resp 16   Ht 1.6 m (5' 3\")   Wt 66.3 kg (146 lb 1.6 oz)   SpO2 99%   BMI 25.88 kg/m      GENERAL:  Resting,  in no acute distress  RESP:  Lungs clear.  No rales, rhonchi, or wheezing  CV:  RRR.  S1 S2 with no murmur. No clicks or rubs.  ABD: Soft, non tender, non distended, no organomegaly. BS are normal.   SKIN:  Age-related changes.  No suspicious lesions or rashes.  PSYCH:  Affect is fatigued, flat.  NEURO: Mental status is somnolent.   EXTREM:  No edema, compression hose on.    Lab/Diagnostic data:    Reviewed in Epic    ASSESSMENT/ORDERS:  Danette was seen today for new admit.    Diagnoses and all orders for this visit:    Cerebrovascular accident (CVA) due to embolism of posterior cerebral artery, unspecified blood vessel laterality (H) . Hemiparesis, aphasia, and " dysphagia as late effects of stroke (H) / Dysphagia, unspecified type  Pt severely disabled after stroke in 2017. Expressive aphasia, dysphagia and cog impairment are stable. She is non ambulatory, does speak in short sentences/phrases per family. Weight 146 lbs this week- this is stable over last 4-5 months. Previously appears she was low 150s. Monitor for any further weight loss.     Chronic a-fib (H)   On warfarin. No s/s of bleeding. Continue CCB, BB. Rate controlled- 60's to 80s.    Partial symptomatic epilepsy with complex partial seizures, not intractable, without status epilepticus (H)  No seizure activity. On keppra. Level wnl in Feb.    Hypertension, unspecified type  Generally controlled- 's. Monitor for lows. Consider stopping metoprolol. Routine labs in August.     Dementia with anxiety, unspecified dementia severity, unspecified dementia type / Anxiety  Mood/affect under good control per family. Continue risperdal at current dosage, as taper resulted in significant worsening of anxiety.    Dyslipidemia  Continue statin given history of CVA.     Chronic systolic congestive heart failure (H)  Not on diuretics. Currently on metoprolol 25 mg. Euvolemic. Weight stable. Monitor. EF 40% in 2018.     Routine labs: CBC, BMP, keppra every 6 months    Total time spent with patient visit was 30 min including patient visit, review of pertinent clinical information, and treatment plan.    Janeth Larkin, CNP

## 2023-06-09 ENCOUNTER — NURSING HOME VISIT (OUTPATIENT)
Dept: FAMILY MEDICINE | Facility: OTHER | Age: 83
End: 2023-06-09
Payer: MEDICARE

## 2023-06-09 DIAGNOSIS — Z78.9 NURSING HOME RESIDENT: Primary | ICD-10-CM

## 2023-06-09 DIAGNOSIS — I50.22 CHRONIC SYSTOLIC CONGESTIVE HEART FAILURE (H): ICD-10-CM

## 2023-06-09 DIAGNOSIS — E78.5 DYSLIPIDEMIA: ICD-10-CM

## 2023-06-09 DIAGNOSIS — F03.94 DEMENTIA WITH ANXIETY, UNSPECIFIED DEMENTIA SEVERITY, UNSPECIFIED DEMENTIA TYPE (H): ICD-10-CM

## 2023-06-09 DIAGNOSIS — I10 HYPERTENSION, UNSPECIFIED TYPE: ICD-10-CM

## 2023-06-09 DIAGNOSIS — Z79.01 ANTICOAGULATION MONITORING, INR RANGE 2-3: ICD-10-CM

## 2023-06-09 PROCEDURE — 99309 SBSQ NF CARE MODERATE MDM 30: CPT

## 2023-06-30 ENCOUNTER — ANTICOAGULATION THERAPY VISIT (OUTPATIENT)
Dept: ANTICOAGULATION | Facility: OTHER | Age: 83
End: 2023-06-30
Attending: FAMILY MEDICINE
Payer: MEDICARE

## 2023-06-30 ENCOUNTER — TRANSFERRED RECORDS (OUTPATIENT)
Dept: HEALTH INFORMATION MANAGEMENT | Facility: OTHER | Age: 83
End: 2023-06-30

## 2023-06-30 DIAGNOSIS — I48.20 CHRONIC A-FIB (H): ICD-10-CM

## 2023-06-30 DIAGNOSIS — Z79.01 ANTICOAGULATION MONITORING, INR RANGE 2-3: Primary | ICD-10-CM

## 2023-06-30 LAB — INR (EXTERNAL): 2.2 (ref 0.9–1.1)

## 2023-06-30 NOTE — PROGRESS NOTES
ANTICOAGULATION MANAGEMENT     Danette Ramos 82 year old female is on warfarin with therapeutic INR result. (Goal INR 2.0-3.0)    Recent labs: (last 7 days)     06/30/23  0819   INR 2.2*       ASSESSMENT       Source(s): Chart Review and Home Care/Facility Nurse       Warfarin doses taken: Warfarin taken as instructed    Diet: No new diet changes identified    Medication/supplement changes: None noted    New illness, injury, or hospitalization: No    Signs or symptoms of bleeding or clotting: No    Previous result: Therapeutic last 2(+) visits    Additional findings: None         PLAN     Recommended plan for no diet, medication or health factor changes affecting INR     Dosing Instructions: Continue your current warfarin dose with next INR in 4 weeks       Summary  As of 6/30/2023    Full warfarin instructions:  5 mg every Mon, Fri; 2.5 mg all other days   Next INR check:  7/28/2023             Faxed dosing and follow up instructions to [facility]    Orders given to  Homecare nurse/facility to recheck    Patient not here, [employer] Communicationsheet with screening questions and current INR received via FAX from outside agency. Results reviewed, Warfarin dosing per protocol, and recommended follow-up appointment made. Paperwork FAXED back to facility.       Plan made per [facility] anticoagulation protocol    Ese Beckett RN  Anticoagulation Clinic  6/30/2023    _______________________________________________________________________     Anticoagulation Episode Summary     Current INR goal:  2.0-3.0   TTR:  64.0 % (2.5 mo)   Target end date:  Indefinite   Send INR reminders to:  [facility] [place]    Indications    Anticoagulation monitoring  INR range 2-3 [Z79.01]  Chronic a-fib (H) [I48.20]           Comments:  Lives at [facility] [phone] fax [phone].         Anticoagulation Care Providers     Provider Role Specialty Phone number    Isabell Bradley MD Referring Family Medicine [phone]

## 2023-07-10 VITALS
TEMPERATURE: 97.2 F | DIASTOLIC BLOOD PRESSURE: 60 MMHG | WEIGHT: 146.1 LBS | RESPIRATION RATE: 16 BRPM | HEIGHT: 63 IN | HEART RATE: 68 BPM | BODY MASS INDEX: 25.89 KG/M2 | SYSTOLIC BLOOD PRESSURE: 102 MMHG | OXYGEN SATURATION: 98 %

## 2023-07-13 ENCOUNTER — NURSING HOME VISIT (OUTPATIENT)
Dept: FAMILY MEDICINE | Facility: OTHER | Age: 83
End: 2023-07-13
Attending: FAMILY MEDICINE
Payer: MEDICARE

## 2023-07-13 DIAGNOSIS — Z78.9 NURSING HOME RESIDENT: Primary | ICD-10-CM

## 2023-07-13 PROCEDURE — 99308 SBSQ NF CARE LOW MDM 20: CPT | Performed by: FAMILY MEDICINE

## 2023-07-14 NOTE — PHARMACY-ANTICOAGULATION SERVICE
Clinical Pharmacy - Warfarin Dosing Consult     Pharmacy has been consulted to manage this patient s warfarin therapy.  Indication: Atrial Fibrillation  Therapy Goal: INR 2-3  Warfarin Prior to Admission: Yes  Warfarin PTA Regimen: 2.5 mg on Tuesdays & Thursday & 5 mg ROW(pharmacy to call on Monday to clarify dosing with the coumadin clinic)  Significant drug interactions: ZIthromax, Rocephin  Recent documented change in oral intake/nutrition: Unknown    INR   Date Value Ref Range Status   10/27/2018 2.37 (H) 0.80 - 1.20 Final   02/22/2018 2.92 (H) 0.80 - 1.20 Final       Recommend warfarin 2.5 mg today.  Pharmacy will monitor Danette Ramos daily and order warfarin doses to achieve specified goal.      Please contact pharmacy as soon as possible if the warfarin needs to be held for a procedure or if the warfarin goals change.       yes

## 2023-07-17 NOTE — PROGRESS NOTES
HISTORY OF PRESENT ILLNESS:      Danette is a 82 year old female (1940)  resident of [facility] who is being seen today for a routine follow-up visit.     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     Discussed with nursing staff who have the following concerns: None.     No concerns with NH record noted as well.      Patient is seen in their room. Family is not present. Patient is stephanie in her room. Resting in her chair. She answers questions in a mumble, unable to discern her response. She does grab my hands and kisses them, however, at the conclusion of our visit.     Current medications, allergies, and interdisciplinary care plan are reviewed.    Patient Active Problem List    Diagnosis Date Noted     Anticoagulation monitoring, INR range 2-3 04/07/2023     Priority: Medium     Hospice care patient 01/07/2018     Priority: Medium     DNR (do not resuscitate) 04/03/2017     Priority: Medium     Physical deconditioning 04/03/2017     Priority: Medium     Delirium 03/03/2017     Priority: Medium     Chest pain 03/03/2017     Priority: Medium     Right leg pain 03/03/2017     Priority: Medium     Generalized weakness 11/01/2015     Priority: Medium     Hemiparesis and speech and language deficit as late effects of stroke (H) 11/01/2015     Priority: Medium     Orthostatic hypotension 05/18/2015     Priority: Medium     Anxiety 05/14/2015     Priority: Medium     Embolic stroke involving posterior cerebral artery (H) 05/11/2015     Priority: Medium     Complex partial seizure (H) 05/11/2015     Priority: Medium     TIA (transient ischemic attack) 05/10/2015     Priority: Medium     Dyslipidemia 11/03/2014     Priority: Medium     Hemiparesis, aphasia, and dysphagia as late effects of stroke (H) 11/03/2014     Priority: Medium     Hypertension 11/03/2014     Priority: Medium     Aphasia 09/16/2014     Priority: Medium     Apraxia 09/16/2014      Priority: Medium     Chronic a-fib (H) 09/16/2014     Priority: Medium     Dysphagia 09/16/2014     Priority: Medium     Hemineglect 09/16/2014     Priority: Medium     Orthostatic tremor 09/16/2014     Priority: Medium     Right hemiparesis (H) 09/16/2014     Priority: Medium     Hypokalemia 09/09/2014     Priority: Medium     Health care directive on file 10/31/2008     Priority: Medium          Social History     Socioeconomic History     Marital status:      Spouse name: Not on file     Number of children: Not on file     Years of education: Not on file     Highest education level: Not on file   Occupational History     Not on file   Tobacco Use     Smoking status: Unknown     Smokeless tobacco: Not on file   Substance and Sexual Activity     Alcohol use: Not Currently     Drug use: Never     Sexual activity: Not Currently   Other Topics Concern     Not on file   Social History Narrative     Not on file     Social Determinants of Health     Financial Resource Strain: Not on file   Food Insecurity: Not on file   Transportation Needs: Not on file   Physical Activity: Not on file   Stress: Not on file   Social Connections: Not on file   Intimate Partner Violence: Not on file   Housing Stability: Not on file        Current Outpatient Medications   Medication Sig     Acetaminophen (TYLENOL PO) Take 1,000 mg by mouth 2 times daily     ACETAMINOPHEN PO Take 650 mg by mouth every 6 hours as needed for pain     Atorvastatin Calcium (LIPITOR PO) Take 40 mg by mouth daily     benzocaine (ORAJEL MAXIMUM STRENGTH) 20 % GEL gel Take by mouth 4 times daily as needed for moderate pain     bisacodyl (DULCOLAX) 10 MG Suppository Place 10 mg rectally PRN every 4 days     bismuth subsalicylate (PEPTO BISMOL) 262 MG/15ML suspension Take 30 mLs by mouth every 4 hours as needed for indigestion     ketoconazole (NIZORAL) 2 % external shampoo Twice weekly     LEVETIRACETAM PO Take 7.5 mLs by mouth 2 times daily     magnesium  "hydroxide (MILK OF MAGNESIA) 400 MG/5ML suspension Take 30 mLs by mouth daily as needed PRN     metoprolol tartrate (LOPRESSOR) 25 MG tablet Take 1 tablet (25 mg) by mouth 2 times daily     nystatin (MYCOSTATIN) 360721 UNIT/GM external powder Apply topically as needed     RisperiDONE (RISPERDAL PO) Take 0.125 mg by mouth daily     senna-docusate (SENOKOT-S;PERICOLACE) 8.6-50 MG per tablet Take 2 tablets by mouth daily     SERTRALINE HCL PO Take 50 mg by mouth daily     Vitamin D, Cholecalciferol, 1000 units CAPS Take 2,000 Units by mouth daily (Patient taking differently: Take 1,000 Units by mouth daily)     Warfarin Sodium (COUMADIN PO) Take 2.5 mg by mouth Wed, Tue, Thu, Sat, Sun     Warfarin Sodium (COUMADIN PO) Take 5 mg by mouth Mon,Fri     No current facility-administered medications for this visit.       Allergies   Allergen Reactions     Bacitracin Rash     Contact dermatitis       I have reviewed the care plan and do agree with the plan.    ROS:  Unable to obtain due to pt factors.     OBJECTIVE:  /60   Pulse 68   Temp 97.2  F (36.2  C)   Resp 16   Ht 1.6 m (5' 3\")   Wt 66.3 kg (146 lb 1.6 oz)   SpO2 98%   BMI 25.88 kg/m      GENERAL:  Resting,  in no acute distress  RESP:  Lungs clear.  No rales, rhonchi, or wheezing  CV:  RRR.  S1 S2 with no murmur. No clicks or rubs.  ABD: Soft, non tender, non distended, no organomegaly. BS are normal.   SKIN:  Age-related changes.  No suspicious lesions or rashes.  PSYCH:  Affect is fatigued, flat.  NEURO: Mental status is somnolent.   EXTREM:  No edema, compression hose on.    Lab/Diagnostic data:    Reviewed in Epic    ASSESSMENT/ORDERS:  Danette was seen today for new admit.    Diagnoses and all orders for this visit:    Cerebrovascular accident (CVA) due to embolism of posterior cerebral artery, unspecified blood vessel laterality (H) . Hemiparesis, aphasia, and dysphagia as late effects of stroke (H) / Dysphagia, unspecified type  Pt severely disabled " after stroke in 2017. She is non ambulatory, does speak in short sentences/phrases per family. Expressive aphasia, dysphagia and cog impairment apparently stable. Weight without significant change    Chronic a-fib (H)   On warfarin. No s/s of bleeding. Continue CCB, BB. Rate controlled- 60's to 80s.    Partial symptomatic epilepsy with complex partial seizures, not intractable, without status epilepticus (H)  No seizure activity. On keppra. Level wnl in Feb 2023.    Hypertension, unspecified type  Generally controlled- 's. Monitor for lows. Consider stopping metoprolol, if HR and BP trend further down.     Dementia with anxiety, unspecified dementia severity, unspecified dementia type / Anxiety  Mood/affect under good control per family. Continue risperdal at current dosage, as taper resulted in significant worsening of anxiety.    Dyslipidemia  Continue statin given history of CVA.     Chronic systolic congestive heart failure (H)  Not on diuretics. Currently on metoprolol 25 mg. Euvolemic. Weight stable. Monitor. EF 40% in 2018.     Routine labs: CBC, BMP, keppra every 6 months    Total time spent with patient visit was 20 min including patient visit, review of pertinent clinical information, and treatment plan.    Selena Townsend MD

## 2023-07-28 ENCOUNTER — TRANSFERRED RECORDS (OUTPATIENT)
Dept: HEALTH INFORMATION MANAGEMENT | Facility: OTHER | Age: 83
End: 2023-07-28

## 2023-07-28 ENCOUNTER — ANTICOAGULATION THERAPY VISIT (OUTPATIENT)
Dept: ANTICOAGULATION | Facility: OTHER | Age: 83
End: 2023-07-28
Attending: FAMILY MEDICINE
Payer: MEDICARE

## 2023-07-28 DIAGNOSIS — Z79.01 ANTICOAGULATION MONITORING, INR RANGE 2-3: Primary | ICD-10-CM

## 2023-07-28 DIAGNOSIS — I48.20 CHRONIC A-FIB (H): ICD-10-CM

## 2023-07-28 LAB — INR (EXTERNAL): 2.5 (ref 0.9–1.1)

## 2023-07-28 NOTE — PROGRESS NOTES
ANTICOAGULATION MANAGEMENT     Danette Ramos 82 year old female is on warfarin with therapeutic INR result. (Goal INR 2.0-3.0)    Recent labs: (last 7 days)     07/28/23  0816   INR 2.5*       ASSESSMENT     Source(s): Chart Review and Patient/Caregiver Call     Warfarin doses taken: Warfarin taken as instructed  Diet: No new diet changes identified  New illness, injury, or hospitalization: No  Medication/supplement changes: None noted  Signs or symptoms of bleeding or clotting: No  Previous INR: Therapeutic last 2(+) visits  Additional findings: None     PLAN     Recommended plan for no diet, medication or health factor changes affecting INR     Dosing Instructions: Continue your current warfarin dose with next INR in 4 weeks       Summary  As of 7/28/2023      Full warfarin instructions:  5 mg every Mon, Fri; 2.5 mg all other days   Next INR check:  8/25/2023               Faxed dosing and follow up instructions to [facility] [place]  Patient not here, [employer] Communicationsheet with screening questions and current INR received via FAX from outside agency. Results reviewed, Warfarin dosing per protocol, and recommended follow-up appointment made. Paperwork FAXED back to facility.       Lab visit scheduled    Education provided: None required    Plan made per [facility] anticoagulation protocol    Julee Flynn RN  Anticoagulation Clinic  7/28/2023    _______________________________________________________________________     Anticoagulation Episode Summary       Current INR goal:  2.0-3.0   TTR:  73.9 % (3.4 mo)   Target end date:  Indefinite   Send INR reminders to:  [facility] [place]    Indications    Anticoagulation monitoring  INR range 2-3 [Z79.01]  Chronic a-fib (H) [I48.20]             Comments:  Lives at [facility] ph [phone] fax [phone].             Anticoagulation Care Providers       Provider Role Specialty Phone number    Isabell Bradley MD Referring Family Medicine  [phone]

## 2023-08-25 ENCOUNTER — TRANSFERRED RECORDS (OUTPATIENT)
Dept: HEALTH INFORMATION MANAGEMENT | Facility: OTHER | Age: 83
End: 2023-08-25

## 2023-08-25 ENCOUNTER — ANTICOAGULATION THERAPY VISIT (OUTPATIENT)
Dept: ANTICOAGULATION | Facility: OTHER | Age: 83
End: 2023-08-25
Attending: FAMILY MEDICINE
Payer: MEDICARE

## 2023-08-25 DIAGNOSIS — I48.20 CHRONIC A-FIB (H): ICD-10-CM

## 2023-08-25 DIAGNOSIS — Z79.01 ANTICOAGULATION MONITORING, INR RANGE 2-3: Primary | ICD-10-CM

## 2023-08-25 LAB — INR (EXTERNAL): 2 (ref 0.9–1.1)

## 2023-08-25 NOTE — PROGRESS NOTES
ANTICOAGULATION MANAGEMENT     Danette Ramos 82 year old female is on warfarin with therapeutic INR result. (Goal INR 2.0-3.0)    Recent labs: (last 7 days)     08/25/23  0805   INR 2.0*       ASSESSMENT     Source(s): Chart Review and Home Care/Facility Nurse     Warfarin doses taken: Warfarin taken as instructed  Diet: No new diet changes identified  Medication/supplement changes: None noted  New illness, injury, or hospitalization: No  Signs or symptoms of bleeding or clotting: No  Previous result: Therapeutic last 2(+) visits  Additional findings: None       PLAN     Recommended plan for no diet, medication or health factor changes affecting INR     Dosing Instructions: Continue your current warfarin dose with next INR in 4 weeks       Summary  As of 8/25/2023      Full warfarin instructions:  5 mg every Mon, Fri; 2.5 mg all other days   Next INR check:  9/22/2023               Faxed dosing and follow up instructions to [facility]    Orders given to  Homecare nurse/facility to recheck    Patient not here, [employer] Communicationsheet with screening questions and current INR received via FAX from outside agency. Results reviewed, Warfarin dosing per protocol, and recommended follow-up appointment made. Paperwork FAXED back to facility.       Plan made per [facility] anticoagulation protocol    Ese Beckett RN  Anticoagulation Clinic  8/25/2023    _______________________________________________________________________     Anticoagulation Episode Summary       Current INR goal:  2.0-3.0   TTR:  79.5 % (4.3 mo)   Target end date:  Indefinite   Send INR reminders to:  [facility] [place]    Indications    Anticoagulation monitoring  INR range 2-3 [Z79.01]  Chronic a-fib (H) [I48.20]             Comments:  Lives at [facility] [phone] fax [phone].             Anticoagulation Care Providers       Provider Role Specialty Phone number    Isabell Bradley MD Referring Family Medicine [phone]

## 2023-09-06 ENCOUNTER — NURSING HOME VISIT (OUTPATIENT)
Dept: FAMILY MEDICINE | Facility: OTHER | Age: 83
End: 2023-09-06
Payer: MEDICARE

## 2023-09-06 VITALS
BODY MASS INDEX: 25.82 KG/M2 | RESPIRATION RATE: 16 BRPM | WEIGHT: 145.7 LBS | TEMPERATURE: 98 F | OXYGEN SATURATION: 96 % | SYSTOLIC BLOOD PRESSURE: 105 MMHG | HEIGHT: 63 IN | DIASTOLIC BLOOD PRESSURE: 70 MMHG | HEART RATE: 80 BPM

## 2023-09-06 DIAGNOSIS — I48.20 CHRONIC A-FIB (H): ICD-10-CM

## 2023-09-06 DIAGNOSIS — F03.94 DEMENTIA WITH ANXIETY, UNSPECIFIED DEMENTIA SEVERITY, UNSPECIFIED DEMENTIA TYPE (H): ICD-10-CM

## 2023-09-06 DIAGNOSIS — E78.5 DYSLIPIDEMIA: ICD-10-CM

## 2023-09-06 DIAGNOSIS — Z78.9 NURSING HOME RESIDENT: Primary | ICD-10-CM

## 2023-09-06 PROCEDURE — 99308 SBSQ NF CARE LOW MDM 20: CPT

## 2023-09-06 RX ORDER — DILTIAZEM HYDROCHLORIDE 120 MG/1
120 TABLET, FILM COATED ORAL DAILY
COMMUNITY

## 2023-09-06 NOTE — PROGRESS NOTES
HISTORY OF PRESENT ILLNESS:      Danette is a 82 year old female (1940)  resident of [facility] who is being seen today for a routine follow-up visit.     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     Discussed with nursing staff who have the following concerns: None.     No concerns with NH record noted as well.      Patient is seen in their room. Family is not present. Patient is seen in her room. She is resting in her bed and opens eyes to my voice. She mumbles in response to my questions.    Current medications, allergies, and interdisciplinary care plan are reviewed.    Patient Active Problem List    Diagnosis Date Noted    Anticoagulation monitoring, INR range 2-3 04/07/2023     Priority: Medium    Hospice care patient 01/07/2018     Priority: Medium    DNR (do not resuscitate) 04/03/2017     Priority: Medium    Physical deconditioning 04/03/2017     Priority: Medium    Delirium 03/03/2017     Priority: Medium    Chest pain 03/03/2017     Priority: Medium    Right leg pain 03/03/2017     Priority: Medium    Generalized weakness 11/01/2015     Priority: Medium    Hemiparesis and speech and language deficit as late effects of stroke (H) 11/01/2015     Priority: Medium    Orthostatic hypotension 05/18/2015     Priority: Medium    Anxiety 05/14/2015     Priority: Medium    Embolic stroke involving posterior cerebral artery (H) 05/11/2015     Priority: Medium    Complex partial seizure (H) 05/11/2015     Priority: Medium    TIA (transient ischemic attack) 05/10/2015     Priority: Medium    Dyslipidemia 11/03/2014     Priority: Medium    Hemiparesis, aphasia, and dysphagia as late effects of stroke (H) 11/03/2014     Priority: Medium    Hypertension 11/03/2014     Priority: Medium    Aphasia 09/16/2014     Priority: Medium    Apraxia 09/16/2014     Priority: Medium    Chronic a-fib (H) 09/16/2014     Priority: Medium    Dysphagia 09/16/2014      Priority: Medium    Hemineglect 09/16/2014     Priority: Medium    Orthostatic tremor 09/16/2014     Priority: Medium    Right hemiparesis (H) 09/16/2014     Priority: Medium    Hypokalemia 09/09/2014     Priority: Medium    Health care directive on file 10/31/2008     Priority: Medium          Social History     Socioeconomic History    Marital status:      Spouse name: Not on file    Number of children: Not on file    Years of education: Not on file    Highest education level: Not on file   Occupational History    Not on file   Tobacco Use    Smoking status: Unknown    Smokeless tobacco: Not on file   Substance and Sexual Activity    Alcohol use: Not Currently    Drug use: Never    Sexual activity: Not Currently   Other Topics Concern    Not on file   Social History Narrative    Not on file     Social Determinants of Health     Financial Resource Strain: Not on file   Food Insecurity: Not on file   Transportation Needs: Not on file   Physical Activity: Not on file   Stress: Not on file   Social Connections: Not on file   Intimate Partner Violence: Not on file   Housing Stability: Not on file        Current Outpatient Medications   Medication Sig    Acetaminophen (TYLENOL PO) Take 1,000 mg by mouth 2 times daily    ACETAMINOPHEN PO Take 650 mg by mouth every 6 hours as needed for pain    Atorvastatin Calcium (LIPITOR PO) Take 40 mg by mouth daily    bisacodyl (DULCOLAX) 10 MG Suppository Place 10 mg rectally PRN every 4 days    bismuth subsalicylate (PEPTO BISMOL) 262 MG/15ML suspension Take 30 mLs by mouth every 4 hours as needed for indigestion    diltiazem (CARDIZEM) 120 MG tablet Take 120 mg by mouth daily    ketoconazole (NIZORAL) 2 % external shampoo Twice weekly    LEVETIRACETAM PO Take 7.5 mLs by mouth 2 times daily    magnesium hydroxide (MILK OF MAGNESIA) 400 MG/5ML suspension Take 30 mLs by mouth daily as needed PRN    metoprolol tartrate (LOPRESSOR) 25 MG tablet Take 1 tablet (25 mg) by mouth  "2 times daily    RisperiDONE (RISPERDAL PO) Take 0.125 mg by mouth daily    senna-docusate (SENOKOT-S;PERICOLACE) 8.6-50 MG per tablet Take 2 tablets by mouth daily    SERTRALINE HCL PO Take 50 mg by mouth daily    Vitamin D, Cholecalciferol, 1000 units CAPS Take 2,000 Units by mouth daily (Patient taking differently: Take 1,000 Units by mouth daily)    Warfarin Sodium (COUMADIN PO) Take 2.5 mg by mouth Wed, Tue, Thu, Sat, Sun    Warfarin Sodium (COUMADIN PO) Take 5 mg by mouth Mon,Fri     No current facility-administered medications for this visit.       Allergies   Allergen Reactions    Bacitracin Rash     Contact dermatitis       I have reviewed the care plan and do agree with the plan.    ROS:  Unable to obtain due to pt factors.     OBJECTIVE:  /70   Pulse 80   Temp 98  F (36.7  C)   Resp 16   Ht 1.6 m (5' 3\")   Wt 66.1 kg (145 lb 11.2 oz)   SpO2 96%   BMI 25.81 kg/m      GENERAL:  Resting,  in no acute distress  RESP:  Lungs clear.  No rales, rhonchi, or wheezing  CV:  RRR.  S1 S2 with no murmur. No clicks or rubs.  ABD: Soft, non tender, non distended, no organomegaly. BS are normal.   SKIN:  Age-related changes.  No suspicious lesions or rashes. No ulcerations to feet.   PSYCH:  Affect is fatigued, flat.  NEURO: Mental status is somnolent. Alerts to voice.   EXTREM:  No edema.    Lab/Diagnostic data:    Reviewed in Epic    ASSESSMENT/ORDERS:  Danette was seen today for new admit.    Diagnoses and all orders for this visit:    Cerebrovascular accident (CVA) due to embolism of posterior cerebral artery, unspecified blood vessel laterality (H) . Hemiparesis, aphasia, and dysphagia as late effects of stroke (H) / Dysphagia, unspecified type  Pt severely disabled after stroke in 2017. She is non ambulatory, does occasionally speak in short sentences/phrases per family. Expressive aphasia, dysphagia and cog impairment have been stable. Weight stable around 145 lbs.     Chronic a-fib (H)   On warfarin. " INR 2.0 on 8/25. No s/s of bleeding. Continue CCB, BB. Rate controlled- 60's to 80s.    Partial symptomatic epilepsy with complex partial seizures, not intractable, without status epilepticus (H)  No seizure activity. On keppra. Level wnl in August.    Hypertension, unspecified type  Generally controlled- -120's. One reading only with SBP 98. Monitor for lows. Consider stopping metoprolol, if HR and BP trend further down.     Dementia with anxiety, unspecified dementia severity, unspecified dementia type / Anxiety  Mood/affect under good control per family. Continue risperdal at current dosage, as taper resulted in significant worsening of anxiety.    Dyslipidemia  Continue statin given history of CVA.     Chronic systolic congestive heart failure (H)  Not on diuretics. Currently on metoprolol. Euvolemic. Weight stable. Monitor. EF 40% in 2018.     Routine labs: CBC, BMP, keppra every 6 months    Total time spent with patient visit was 20 min including patient visit, review of pertinent clinical information, and treatment plan.    Janeth Larkin, CNP

## 2023-09-22 ENCOUNTER — TRANSFERRED RECORDS (OUTPATIENT)
Dept: HEALTH INFORMATION MANAGEMENT | Facility: OTHER | Age: 83
End: 2023-09-22

## 2023-09-22 ENCOUNTER — ANTICOAGULATION THERAPY VISIT (OUTPATIENT)
Dept: ANTICOAGULATION | Facility: OTHER | Age: 83
End: 2023-09-22
Attending: FAMILY MEDICINE
Payer: MEDICARE

## 2023-09-22 DIAGNOSIS — I48.20 CHRONIC A-FIB (H): ICD-10-CM

## 2023-09-22 DIAGNOSIS — Z79.01 ANTICOAGULATION MONITORING, INR RANGE 2-3: Primary | ICD-10-CM

## 2023-09-22 LAB — INR (EXTERNAL): 1.9 (ref 0.9–1.1)

## 2023-09-22 NOTE — PROGRESS NOTES
ANTICOAGULATION MANAGEMENT     Danette Ramos 82 year old female is on warfarin with subtherapeutic INR result. (Goal INR 2.0-3.0)    Recent labs: (last 7 days)     09/22/23  0920   INR 1.9*       ASSESSMENT     Source(s): Chart Review and Home Care/Facility Nurse     Warfarin doses taken: Warfarin taken as instructed  Diet: No new diet changes identified  New illness, injury, or hospitalization: No  Medication/supplement changes: None noted  Signs or symptoms of bleeding or clotting: No  Previous INR: Therapeutic last 2(+) visits  Additional findings: None     PLAN     Recommended plan for no diet, medication or health factor changes affecting INR     Dosing Instructions: Continue your current warfarin dose with next INR in 2 weeks       Summary  As of 9/22/2023      Full warfarin instructions:  5 mg every Mon, Fri; 2.5 mg all other days   Next INR check:  10/6/2023               Faxed dosing and follow up instructions to continue current dosing and recheck INR on 10/6/23 Patient not here, [employer] Communicationsheet with screening questions and current INR received via FAX from outside agency. Results reviewed, Warfarin dosing per protocol, and recommended follow-up appointment made. Paperwork FAXED back to facility.       Lab visit scheduled    Education provided: None required    Plan made per [facility] anticoagulation protocol    Julee Flynn RN  Anticoagulation Clinic  9/22/2023    _______________________________________________________________________     Anticoagulation Episode Summary       Current INR goal:  2.0-3.0   TTR:  65.4 % (5.3 mo)   Target end date:  Indefinite   Send INR reminders to:  [facility] [place]    Indications    Anticoagulation monitoring  INR range 2-3 [Z79.01]  Chronic a-fib (H) [I48.20]             Comments:  Lives at [facility] [phone] fax [phone].             Anticoagulation Care Providers       Provider Role Specialty Phone number    Isabell Bradley MD  [facility] Family Medicine [phone]

## 2023-10-06 ENCOUNTER — ANTICOAGULATION THERAPY VISIT (OUTPATIENT)
Dept: ANTICOAGULATION | Facility: OTHER | Age: 83
End: 2023-10-06
Attending: FAMILY MEDICINE
Payer: MEDICARE

## 2023-10-06 ENCOUNTER — LAB REQUISITION (OUTPATIENT)
Dept: LAB | Facility: HOSPITAL | Age: 83
End: 2023-10-06
Payer: MEDICARE

## 2023-10-06 DIAGNOSIS — I48.91 UNSPECIFIED ATRIAL FIBRILLATION (H): ICD-10-CM

## 2023-10-06 DIAGNOSIS — I48.20 CHRONIC A-FIB (H): ICD-10-CM

## 2023-10-06 DIAGNOSIS — Z79.01 ANTICOAGULATION MONITORING, INR RANGE 2-3: Primary | ICD-10-CM

## 2023-10-06 LAB — INR PPP: 1.7 (ref 0.85–1.15)

## 2023-10-06 PROCEDURE — 85610 PROTHROMBIN TIME: CPT | Performed by: FAMILY MEDICINE

## 2023-10-06 NOTE — PROGRESS NOTES
ANTICOAGULATION MANAGEMENT     Danette Ramos 82 year old female is on warfarin with subtherapeutic INR result. (Goal INR 2.0-3.0)    Recent labs: (last 7 days)     10/06/23  0750   INR 1.70*       ASSESSMENT     Source(s): Chart Review and Home Care/Facility Nurse     Warfarin doses taken: Warfarin taken as instructed  Diet: No new diet changes identified  Medication/supplement changes: None noted  New illness, injury, or hospitalization: No  Signs or symptoms of bleeding or clotting: No  Previous result: Subtherapeutic  Additional findings: None       PLAN     Recommended plan for no diet, medication or health factor changes affecting INR     Dosing Instructions: Increase your warfarin dose (11.1% change) with next INR in 2 weeks       Summary  As of 10/6/2023      Full warfarin instructions:  5 mg every Mon, Wed, Fri; 2.5 mg all other days   Next INR check:  10/20/2023               Faxed dosing and follow up instructions to [facility]    Orders given to  Homecare nurse/facility to recheck    Patient not here, [employer] Communicationsheet with screening questions and current INR received via FAX from outside agency. Results reviewed, Warfarin dosing per protocol, and recommended follow-up appointment made. Paperwork FAXED back to facility.       Plan made per [facility] anticoagulation protocol    Ese Beckett RN  Anticoagulation Clinic  10/6/2023    _______________________________________________________________________     Anticoagulation Episode Summary       Current INR goal:  2.0-3.0   TTR:  60.1 % (5.7 mo)   Target end date:  Indefinite   Send INR reminders to:  [facility] [place]    Indications    Anticoagulation monitoring  INR range 2-3 [Z79.01]  Chronic a-fib (H) [I48.20]             Comments:  Lives at [facility] [phone] fax [phone].             Anticoagulation Care Providers       Provider Role Specialty Phone number    Isabell Bradley MD Referring Family Medicine [phone]

## 2023-10-09 ENCOUNTER — TRANSFERRED RECORDS (OUTPATIENT)
Dept: HEALTH INFORMATION MANAGEMENT | Facility: OTHER | Age: 83
End: 2023-10-09
Payer: MEDICARE

## 2023-10-20 ENCOUNTER — TRANSFERRED RECORDS (OUTPATIENT)
Dept: HEALTH INFORMATION MANAGEMENT | Facility: OTHER | Age: 83
End: 2023-10-20

## 2023-10-20 ENCOUNTER — ANTICOAGULATION THERAPY VISIT (OUTPATIENT)
Dept: ANTICOAGULATION | Facility: OTHER | Age: 83
End: 2023-10-20
Attending: FAMILY MEDICINE
Payer: MEDICARE

## 2023-10-20 DIAGNOSIS — Z79.01 ANTICOAGULATION MONITORING, INR RANGE 2-3: Primary | ICD-10-CM

## 2023-10-20 DIAGNOSIS — I48.20 CHRONIC A-FIB (H): ICD-10-CM

## 2023-10-20 LAB — INR (EXTERNAL): 1.8 (ref 0.9–1.1)

## 2023-10-20 NOTE — PROGRESS NOTES
ANTICOAGULATION MANAGEMENT     Danette Ramos 83 year old female is on warfarin with subtherapeutic INR result. (Goal INR 2.0-3.0)    Recent labs: (last 7 days)     10/20/23  0801   INR 1.8*       ASSESSMENT     Source(s): Chart Review and Home Care/Facility Nurse     Warfarin doses taken: Warfarin taken as instructed  Diet: No new diet changes identified  Medication/supplement changes: None noted  New illness, injury, or hospitalization: No  Signs or symptoms of bleeding or clotting: No  Previous result: Subtherapeutic  Additional findings: None       PLAN     Recommended plan for no diet, medication or health factor changes affecting INR     Dosing Instructions: Increase your warfarin dose (10% change) with next INR in 2 weeks       Summary  As of 10/20/2023      Full warfarin instructions:  5 mg every Mon, Wed, Sat; 2.5 mg all other days   Next INR check:  11/3/2023               Faxed dosing and follow up instructions to [facility]    Orders given to  Homecare nurse/facility to recheck    Patient not here, [employer] Communication sheet with screening questions and current INR received via FAX from outside agency. Results reviewed, Warfarin dosing per protocol, and recommended follow-up appointment made. Paperwork FAXED back to facility.       Plan made per [facility] anticoagulation protocol    Ese Beckett RN  Anticoagulation Clinic  10/20/2023    _______________________________________________________________________     Anticoagulation Episode Summary       Current INR goal:  2.0-3.0   TTR:  55.6% (6.2 mo)   Target end date:  Indefinite   Send INR reminders to:  [facility] [place]    Indications    Anticoagulation monitoring  INR range 2-3 [Z79.01]  Chronic a-fib (H) [I48.20]             Comments:  Lives at [facility] [phone] fax [phone].             Anticoagulation Care Providers       Provider Role Specialty Phone number    Isabell Bradley MD Referring Family Medicine [phone]

## 2023-11-03 ENCOUNTER — ANTICOAGULATION THERAPY VISIT (OUTPATIENT)
Dept: ANTICOAGULATION | Facility: OTHER | Age: 83
End: 2023-11-03
Attending: FAMILY MEDICINE
Payer: MEDICARE

## 2023-11-03 ENCOUNTER — TRANSFERRED RECORDS (OUTPATIENT)
Dept: HEALTH INFORMATION MANAGEMENT | Facility: OTHER | Age: 83
End: 2023-11-03

## 2023-11-03 DIAGNOSIS — I48.20 CHRONIC A-FIB (H): ICD-10-CM

## 2023-11-03 DIAGNOSIS — Z79.01 ANTICOAGULATION MONITORING, INR RANGE 2-3: Primary | ICD-10-CM

## 2023-11-03 LAB — INR (EXTERNAL): 3 (ref 0.9–1.1)

## 2023-11-03 NOTE — PROGRESS NOTES
ANTICOAGULATION MANAGEMENT     Danette Ramos 83 year old female is on warfarin with therapeutic INR result. (Goal INR 2.0-3.0)    Recent labs: (last 7 days)     11/03/23  0745   INR 3.0*       ASSESSMENT     Source(s): Chart Review and Home Care/Facility Nurse     Warfarin doses taken: Warfarin taken as instructed  Diet: No new diet changes identified  New illness, injury, or hospitalization: No  Medication/supplement changes: None noted  Signs or symptoms of bleeding or clotting: No  Previous INR: Subtherapeutic  Additional findings: None     PLAN     Recommended plan for no diet, medication or health factor changes affecting INR     Dosing Instructions: Continue your current warfarin dose with next INR in 2 weeks       Summary  As of 11/3/2023      Full warfarin instructions:  5 mg every Mon, Wed, Sat; 2.5 mg all other days   Next INR check:  11/17/2023               Faxed dosing and follow up instructions to [facility]   Patient not here, [employer] Communicationsheet with screening questions and current INR received via FAX from outside agency. Results reviewed, Warfarin dosing per protocol, and recommended follow-up appointment made. Paperwork FAXED back to facility.     Lab visit scheduled    Education provided: None required    Plan made per [facility] anticoagulation protocol    Julee Flynn, RN  Anticoagulation Clinic  11/3/2023    _______________________________________________________________________     Anticoagulation Episode Summary       Current INR goal:  2.0-3.0   TTR:  57.5% (6.7 mo)   Target end date:  Indefinite   Send INR reminders to:  [facility] [place]    Indications    Anticoagulation monitoring  INR range 2-3 [Z79.01]  Chronic a-fib (H) [I48.20]             Comments:  Lives at [facility] [phone] fax [phone].             Anticoagulation Care Providers       Provider Role Specialty Phone number    Isabell Bradley MD Referring Family Medicine [phone]

## 2023-11-17 ENCOUNTER — TRANSFERRED RECORDS (OUTPATIENT)
Dept: HEALTH INFORMATION MANAGEMENT | Facility: OTHER | Age: 83
End: 2023-11-17

## 2023-11-17 ENCOUNTER — ANTICOAGULATION THERAPY VISIT (OUTPATIENT)
Dept: ANTICOAGULATION | Facility: OTHER | Age: 83
End: 2023-11-17
Attending: FAMILY MEDICINE
Payer: MEDICARE

## 2023-11-17 DIAGNOSIS — Z79.01 ANTICOAGULATION MONITORING, INR RANGE 2-3: Primary | ICD-10-CM

## 2023-11-17 DIAGNOSIS — I48.20 CHRONIC A-FIB (H): ICD-10-CM

## 2023-11-17 LAB — INR (EXTERNAL): 4.2 (ref 0.9–1.1)

## 2023-11-17 NOTE — PROGRESS NOTES
ANTICOAGULATION MANAGEMENT     Danette Ramos 83 year old female is on warfarin with supratherapeutic INR result. (Goal INR 2.0-3.0)    Recent labs: (last 7 days)     11/17/23  0810   INR 4.2*       ASSESSMENT     Source(s): Chart Review and Home Care/Facility Nurse     Warfarin doses taken: Warfarin taken as instructed  Diet: No new diet changes identified  Medication/supplement changes: None noted  New illness, injury, or hospitalization: No  Signs or symptoms of bleeding or clotting: No  Previous result: Therapeutic last 2(+) visits  Additional findings: None       PLAN     Recommended plan for no diet, medication or health factor changes affecting INR     Dosing Instructions: hold dose then decrease your warfarin dose (10% change) with next INR in 2 weeks       Summary  As of 11/17/2023      Full warfarin instructions:  11/17: Hold; Otherwise 5 mg every Mon, Thu; 2.5 mg all other days   Next INR check:  12/1/2023               Faxed dosing and follow up instructions to [facility]    Orders given to  Homecare nurse/facility to recheck    Patient not here, [employer] Communication sheet with screening questions and current INR received via FAX from outside agency. Results reviewed, Warfarin dosing per protocol, and recommended follow-up appointment made. Paperwork FAXED back to facility.       Plan made per [facility] anticoagulation protocol    Ese Beckett RN  Anticoagulation Clinic  11/17/2023    _______________________________________________________________________     Anticoagulation Episode Summary       Current INR goal:  2.0-3.0   TTR:  53.8% (7.1 mo)   Target end date:  Indefinite   Send INR reminders to:  [facility] [place]    Indications    Anticoagulation monitoring  INR range 2-3 [Z79.01]  Chronic a-fib (H) [I48.20]             Comments:  Lives at [facility] [phone] fax [phone].             Anticoagulation Care Providers       Provider Role Specialty Phone number    Isabell Bradley  MD [facility] Medicine [phone]

## 2023-11-29 VITALS
OXYGEN SATURATION: 95 % | HEART RATE: 66 BPM | SYSTOLIC BLOOD PRESSURE: 134 MMHG | DIASTOLIC BLOOD PRESSURE: 95 MMHG | BODY MASS INDEX: 25.77 KG/M2 | TEMPERATURE: 97.2 F | WEIGHT: 145.5 LBS

## 2023-11-30 ENCOUNTER — NURSING HOME VISIT (OUTPATIENT)
Dept: FAMILY MEDICINE | Facility: OTHER | Age: 83
End: 2023-11-30
Attending: FAMILY MEDICINE
Payer: MEDICARE

## 2023-11-30 DIAGNOSIS — Z78.9 NURSING HOME RESIDENT: Primary | ICD-10-CM

## 2023-11-30 PROCEDURE — 99308 SBSQ NF CARE LOW MDM 20: CPT | Performed by: FAMILY MEDICINE

## 2023-12-01 ENCOUNTER — ANTICOAGULATION THERAPY VISIT (OUTPATIENT)
Dept: ANTICOAGULATION | Facility: OTHER | Age: 83
End: 2023-12-01
Attending: FAMILY MEDICINE
Payer: MEDICARE

## 2023-12-01 ENCOUNTER — TRANSFERRED RECORDS (OUTPATIENT)
Dept: HEALTH INFORMATION MANAGEMENT | Facility: OTHER | Age: 83
End: 2023-12-01

## 2023-12-01 DIAGNOSIS — I48.20 CHRONIC A-FIB (H): ICD-10-CM

## 2023-12-01 DIAGNOSIS — Z79.01 ANTICOAGULATION MONITORING, INR RANGE 2-3: Primary | ICD-10-CM

## 2023-12-01 LAB — INR (EXTERNAL): 2.9 (ref 0.9–1.1)

## 2023-12-01 NOTE — PROGRESS NOTES
ANTICOAGULATION MANAGEMENT     Danette Ramos 83 year old female is on warfarin with therapeutic INR result. (Goal INR 2.0-3.0)    Recent labs: (last 7 days)     12/01/23  0808   INR 2.9*       ASSESSMENT     Source(s): Chart Review and Home Care/Facility Nurse     Warfarin doses taken: Warfarin taken as instructed  Diet: No new diet changes identified  Medication/supplement changes: None noted  New illness, injury, or hospitalization: No  Signs or symptoms of bleeding or clotting: No  Previous result: Supratherapeutic  Additional findings: None       PLAN     Recommended plan for no diet, medication or health factor changes affecting INR     Dosing Instructions: Continue your current warfarin dose with next INR in 4 weeks       Summary  As of 12/1/2023      Full warfarin instructions:  5 mg every Mon, Thu; 2.5 mg all other days   Next INR check:  12/29/2023               Faxed dosing and follow up instructions to [facility]    Orders given to  Homecare nurse/facility to recheck    Patient not here, [employer] Communicationsheet with screening questions and current INR received via FAX from outside agency. Results reviewed, Warfarin dosing per protocol, and recommended follow-up appointment made. Paperwork FAXED back to facility.       Plan made per [facility] anticoagulation protocol    Ese Beckett RN  Anticoagulation Clinic  12/1/2023    _______________________________________________________________________     Anticoagulation Episode Summary       Current INR goal:  2.0-3.0   TTR:  50.9% (7.6 mo)   Target end date:  Indefinite   Send INR reminders to:  [facility] [place]    Indications    Anticoagulation monitoring  INR range 2-3 [Z79.01]  Chronic a-fib (H) [I48.20]             Comments:  Lives at [facility] [phone] fax [phone].             Anticoagulation Care Providers       Provider Role Specialty Phone number    Isabell Bradley MD Referring Family Medicine [phone]

## 2023-12-05 NOTE — PROGRESS NOTES
HISTORY OF PRESENT ILLNESS:      Danette is a 83 year old female (1940)  resident of [facility] who is being seen today for a routine follow-up visit.     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     Discussed with nursing staff who have the following concerns: None. Reviewed NH records, no concerns noted in the last month or so.     Patient is seen in their room. Family is not present. Patient is observed out in the common area, alert and appears comfortable. Brought back to her room for exam, answers questions in mumbled voice, but smiles and is pleasant. Tolerates exam without issues.     Current medications, allergies, and interdisciplinary care plan are reviewed.    Patient Active Problem List    Diagnosis Date Noted    Anticoagulation monitoring, INR range 2-3 04/07/2023     Priority: Medium    Hospice care patient 01/07/2018     Priority: Medium    DNR (do not resuscitate) 04/03/2017     Priority: Medium    Physical deconditioning 04/03/2017     Priority: Medium    Delirium 03/03/2017     Priority: Medium    Chest pain 03/03/2017     Priority: Medium    Right leg pain 03/03/2017     Priority: Medium    Generalized weakness 11/01/2015     Priority: Medium    Hemiparesis and speech and language deficit as late effects of stroke (H) 11/01/2015     Priority: Medium    Orthostatic hypotension 05/18/2015     Priority: Medium    Anxiety 05/14/2015     Priority: Medium    Embolic stroke involving posterior cerebral artery (H) 05/11/2015     Priority: Medium    Complex partial seizure (H) 05/11/2015     Priority: Medium    TIA (transient ischemic attack) 05/10/2015     Priority: Medium    Dyslipidemia 11/03/2014     Priority: Medium    Hemiparesis, aphasia, and dysphagia as late effects of stroke (H) 11/03/2014     Priority: Medium    Hypertension 11/03/2014     Priority: Medium    Aphasia 09/16/2014     Priority: Medium    Apraxia 09/16/2014      Priority: Medium    Chronic a-fib (H) 09/16/2014     Priority: Medium    Dysphagia 09/16/2014     Priority: Medium    Hemineglect 09/16/2014     Priority: Medium    Orthostatic tremor 09/16/2014     Priority: Medium    Right hemiparesis (H) 09/16/2014     Priority: Medium    Hypokalemia 09/09/2014     Priority: Medium    Health care directive on file 10/31/2008     Priority: Medium          Social History     Socioeconomic History    Marital status:      Spouse name: Not on file    Number of children: Not on file    Years of education: Not on file    Highest education level: Not on file   Occupational History    Not on file   Tobacco Use    Smoking status: Unknown    Smokeless tobacco: Not on file   Substance and Sexual Activity    Alcohol use: Not Currently    Drug use: Never    Sexual activity: Not Currently   Other Topics Concern    Not on file   Social History Narrative    Not on file     Social Determinants of Health     Financial Resource Strain: Not on file   Food Insecurity: Not on file   Transportation Needs: Not on file   Physical Activity: Not on file   Stress: Not on file   Social Connections: Not on file   Interpersonal Safety: Not on file   Housing Stability: Not on file        Current Outpatient Medications   Medication Sig    Acetaminophen (TYLENOL PO) Take 1,000 mg by mouth 2 times daily    ACETAMINOPHEN PO Take 650 mg by mouth every 6 hours as needed for pain    Atorvastatin Calcium (LIPITOR PO) Take 40 mg by mouth daily    bisacodyl (DULCOLAX) 10 MG Suppository Place 10 mg rectally PRN every 4 days    bismuth subsalicylate (PEPTO BISMOL) 262 MG/15ML suspension Take 30 mLs by mouth every 4 hours as needed for indigestion    diltiazem (CARDIZEM) 120 MG tablet Take 120 mg by mouth daily    ketoconazole (NIZORAL) 2 % external shampoo Twice weekly    LEVETIRACETAM PO Take 7.5 mLs by mouth 2 times daily    magnesium hydroxide (MILK OF MAGNESIA) 400 MG/5ML suspension Take 30 mLs by mouth daily  as needed PRN    metoprolol tartrate (LOPRESSOR) 25 MG tablet Take 1 tablet (25 mg) by mouth 2 times daily    RisperiDONE (RISPERDAL PO) Take 0.125 mg by mouth daily    senna-docusate (SENOKOT-S;PERICOLACE) 8.6-50 MG per tablet Take 2 tablets by mouth daily    SERTRALINE HCL PO Take 50 mg by mouth daily    Vitamin D, Cholecalciferol, 1000 units CAPS Take 2,000 Units by mouth daily (Patient taking differently: Take 1,000 Units by mouth daily)    Warfarin Sodium (COUMADIN PO) Take 2.5 mg by mouth Wed, Tue, Thu, Sat, Sun    Warfarin Sodium (COUMADIN PO) Take 5 mg by mouth Mon,Fri     No current facility-administered medications for this visit.       Allergies   Allergen Reactions    Bacitracin Rash     Contact dermatitis       I have reviewed the care plan and do agree with the plan.    ROS:  Unable to obtain due to pt factors.     OBJECTIVE:  BP (!) 134/95   Pulse 66   Temp 97.2  F (36.2  C)   Wt 66 kg (145 lb 8 oz)   SpO2 95%   BMI 25.77 kg/m      GENERAL:  In no acute distress  RESP:  Lungs clear.  No rales, rhonchi, or wheezing  CV:  RRR.  S1 S2 with no murmur. No clicks or rubs.  ABD: Soft, non tender, non distended, no organomegaly. BS are normal.   SKIN:  Age-related changes.  No suspicious lesions or rashes. No ulcerations to feet.   PSYCH:  Affect is fatigued, flat.  NEURO: Mental status is alert   EXTREM:  No edema.    Lab/Diagnostic data:    Reviewed in Epic    ASSESSMENT/ORDERS:  Danette was seen today for new admit.    Diagnoses and all orders for this visit:    Cerebrovascular accident (CVA) due to embolism of posterior cerebral artery, unspecified blood vessel laterality (H) . Hemiparesis, aphasia, and dysphagia as late effects of stroke (H) / Dysphagia, unspecified type  Pt severely disabled after stroke in 2017. She is non ambulatory, does occasionally speak in short sentences/phrases per family. Expressive aphasia, dysphagia and cog impairment have been stable. Weights remarkably stable around 145  lbs.     Chronic a-fib (H)   On warfarin. INR 2.0 on 8/25. No s/s of bleeding. Continue CCB, BB. Rate controlled- 60's to 80s.    Partial symptomatic epilepsy with complex partial seizures, not intractable, without status epilepticus (H)  No seizure activity. On keppra. Level wnl in August. 2023    Hypertension, unspecified type  Generally controlled. No changes in medications made otday.     Dementia with anxiety, unspecified dementia severity, unspecified dementia type / Anxiety  Mood/affect stable per staff. Continue risperdal at current dosage, as taper resulted in significant worsening of anxiety per staff and family.     Dyslipidemia  Continue statin given history of CVA.     Chronic systolic congestive heart failure (H)  Not on diuretics. Currently on metoprolol. Euvolemic. Weight stable. Monitor. EF 40% in 2018.     Routine labs: CBC, BMP, keppra every 6 months    Total time spent with patient visit was 20 min including patient visit, review of pertinent clinical information, and treatment plan.    Selena Townsend MD

## 2023-12-29 ENCOUNTER — ANTICOAGULATION THERAPY VISIT (OUTPATIENT)
Dept: ANTICOAGULATION | Facility: OTHER | Age: 83
End: 2023-12-29
Attending: FAMILY MEDICINE
Payer: MEDICARE

## 2023-12-29 DIAGNOSIS — I48.20 CHRONIC A-FIB (H): ICD-10-CM

## 2023-12-29 DIAGNOSIS — Z79.01 ANTICOAGULATION MONITORING, INR RANGE 2-3: Primary | ICD-10-CM

## 2023-12-29 LAB — INR (EXTERNAL): 4.1 (ref 0.9–1.1)

## 2023-12-29 NOTE — PROGRESS NOTES
ANTICOAGULATION MANAGEMENT     Danette Ramos 83 year old female is on warfarin with supratherapeutic INR result. (Goal INR 2.0-3.0)    Recent labs: (last 7 days)     12/29/23  1456   INR 4.1*       ASSESSMENT     Source(s): Chart Review and Home Care/Facility Nurse     Warfarin doses taken: Warfarin taken as instructed  Diet:  decreased appetite may be affecting diet and INR  Medication/supplement changes: None noted  New illness, injury, or hospitalization: No  Signs or symptoms of bleeding or clotting: No  Previous result: Therapeutic last visit; previously outside of goal range  Additional findings: None       PLAN     Recommended plan for ongoing change(s) affecting INR     Dosing Instructions: hold dose then decrease your warfarin dose (11.1% change) with next INR in 1 week       Summary  As of 12/29/2023      Full warfarin instructions:  12/29: Hold; Otherwise 5 mg every Thu; 2.5 mg all other days   Next INR check:  1/5/2024               Telephone call with [facility] nurse who verbalizes understanding and agrees to plan    Orders given to  Homecare nurse/facility to recheck    Education provided:   None required    Plan made per [facility] anticoagulation protocol    Ese Beckett RN  Anticoagulation Clinic  12/29/2023    _______________________________________________________________________     Anticoagulation Episode Summary       Current INR goal:  2.0-3.0   TTR:  46.2% (8.5 mo)   Target end date:  Indefinite   Send INR reminders to:  MAMIE BARTHOLOMEW    Indications    Anticoagulation monitoring  INR range 2-3 [Z79.01]  Chronic a-fib (H) [I48.20]             Comments:  Lives at [facility] [phone] fax [phone].             Anticoagulation Care Providers       Provider Role Specialty Phone number    Isabell Bradley MD Referring Family Medicine [phone]

## 2024-01-05 ENCOUNTER — ANTICOAGULATION THERAPY VISIT (OUTPATIENT)
Dept: ANTICOAGULATION | Facility: OTHER | Age: 84
End: 2024-01-05
Attending: FAMILY MEDICINE
Payer: MEDICARE

## 2024-01-05 ENCOUNTER — TRANSFERRED RECORDS (OUTPATIENT)
Dept: HEALTH INFORMATION MANAGEMENT | Facility: OTHER | Age: 84
End: 2024-01-05

## 2024-01-05 DIAGNOSIS — I48.20 CHRONIC A-FIB (H): ICD-10-CM

## 2024-01-05 DIAGNOSIS — Z79.01 ANTICOAGULATION MONITORING, INR RANGE 2-3: Primary | ICD-10-CM

## 2024-01-05 LAB — INR (EXTERNAL): 3.1 (ref 0.9–1.1)

## 2024-01-12 ENCOUNTER — TRANSFERRED RECORDS (OUTPATIENT)
Dept: HEALTH INFORMATION MANAGEMENT | Facility: OTHER | Age: 84
End: 2024-01-12

## 2024-01-12 ENCOUNTER — ANTICOAGULATION THERAPY VISIT (OUTPATIENT)
Dept: ANTICOAGULATION | Facility: OTHER | Age: 84
End: 2024-01-12
Attending: FAMILY MEDICINE
Payer: MEDICARE

## 2024-01-12 DIAGNOSIS — I48.20 CHRONIC A-FIB (H): ICD-10-CM

## 2024-01-12 DIAGNOSIS — Z79.01 ANTICOAGULATION MONITORING, INR RANGE 2-3: Primary | ICD-10-CM

## 2024-01-12 LAB — INR (EXTERNAL): 2.1 (ref 0.9–1.1)

## 2024-01-12 NOTE — PROGRESS NOTES
ANTICOAGULATION MANAGEMENT     Danette Ramos 83 year old female is on warfarin with therapeutic INR result. (Goal INR 2.0-3.0)    Recent labs: (last 7 days)     01/12/24  0802   INR 2.1*       ASSESSMENT     Source(s): Chart Review and Home Care/Facility Nurse     Warfarin doses taken: Warfarin taken as instructed  Diet: No new diet changes identified  Medication/supplement changes: None noted  New illness, injury, or hospitalization: No  Signs or symptoms of bleeding or clotting: No  Previous result: Supratherapeutic  Additional findings: None       PLAN     Recommended plan for no diet, medication or health factor changes affecting INR     Dosing Instructions: Increase your warfarin dose (6.7% change) with next INR in 1 week       Summary  As of 1/12/2024      Full warfarin instructions:  5 mg every Fri; 2.5 mg all other days   Next INR check:  1/19/2024               Faxed dosing and follow up instructions to [facility]    Orders given to  Homecare nurse/facility to recheck    Patient not here, [employer] Communicationsheet with screening questions and current INR received via FAX from outside agency. Results reviewed, Warfarin dosing per protocol, and recommended follow-up appointment made. Paperwork FAXED back to facility.       Plan made per [facility] anticoagulation protocol    Ese Beckett RN  Anticoagulation Clinic  1/12/2024    _______________________________________________________________________     Anticoagulation Episode Summary       Current INR goal:  2.0-3.0   TTR:  46.2% (9 mo)   Target end date:  Indefinite   Send INR reminders to:  [facility]    Indications    Anticoagulation monitoring  INR range 2-3 [Z79.01]  Chronic a-fib (H) [I48.20]             Comments:  Lives at [facility] ph [phone] fax [phone].             Anticoagulation Care Providers       Provider Role Specialty Phone number    Isabell Bradley MD Referring Family Medicine [phone]

## 2024-01-19 ENCOUNTER — TRANSFERRED RECORDS (OUTPATIENT)
Dept: HEALTH INFORMATION MANAGEMENT | Facility: OTHER | Age: 84
End: 2024-01-19

## 2024-01-19 ENCOUNTER — ANTICOAGULATION THERAPY VISIT (OUTPATIENT)
Dept: ANTICOAGULATION | Facility: OTHER | Age: 84
End: 2024-01-19
Attending: FAMILY MEDICINE
Payer: MEDICARE

## 2024-01-19 DIAGNOSIS — Z79.01 ANTICOAGULATION MONITORING, INR RANGE 2-3: Primary | ICD-10-CM

## 2024-01-19 DIAGNOSIS — I48.20 CHRONIC A-FIB (H): ICD-10-CM

## 2024-01-19 LAB — INR (EXTERNAL): 2.5 (ref 0.9–1.1)

## 2024-01-19 NOTE — PROGRESS NOTES
ANTICOAGULATION MANAGEMENT     Danette Ramos 83 year old female is on warfarin with therapeutic INR result. (Goal INR 2.0-3.0)    Recent labs: (last 7 days)     01/19/24  0751   INR 2.5*       ASSESSMENT     Source(s): Template     Warfarin doses taken: Warfarin taken as instructed  Diet: No new diet changes identified  New illness, injury, or hospitalization: No  Medication/supplement changes: None noted  Signs or symptoms of bleeding or clotting: No  Previous INR: Therapeutic last visit; previously outside of goal range  Additional findings: None     PLAN     Recommended plan for no diet, medication or health factor changes affecting INR     Dosing Instructions: Continue your current warfarin dose with next INR in 1 week       Summary  As of 1/19/2024      Full warfarin instructions:  5 mg every Fri; 2.5 mg all other days   Next INR check:  1/26/2024               Faxed dosing and follow up instructions to [facility]    Orders given to  Homecare nurse/facility to recheck  Patient not here, [employer] Communicationsheet with screening questions and current INR received via FAX from outside agency. Results reviewed, Warfarin dosing per protocol, and recommended follow-up appointment made. Paperwork FAXED back to facility.       Education provided: None required    Plan made per [facility] anticoagulation protocol    Margot Duran RN  Anticoagulation Clinic  1/19/2024    _______________________________________________________________________     Anticoagulation Episode Summary       Current INR goal:  2.0-3.0   TTR:  47.6% (9.2 mo)   Target end date:  Indefinite   Send INR reminders to:  [facility] [place]    Indications    Anticoagulation monitoring  INR range 2-3 [Z79.01]  Chronic a-fib (H) [I48.20]             Comments:  Lives at [facility] ph [phone] fax [phone].             Anticoagulation Care Providers       Provider Role Specialty Phone number    Isabell Bradley MD Referring Family  Medicine [phone]

## 2024-01-26 ENCOUNTER — ANTICOAGULATION THERAPY VISIT (OUTPATIENT)
Dept: ANTICOAGULATION | Facility: OTHER | Age: 84
End: 2024-01-26
Attending: FAMILY MEDICINE
Payer: MEDICARE

## 2024-01-26 ENCOUNTER — TRANSFERRED RECORDS (OUTPATIENT)
Dept: HEALTH INFORMATION MANAGEMENT | Facility: OTHER | Age: 84
End: 2024-01-26

## 2024-01-26 DIAGNOSIS — Z79.01 ANTICOAGULATION MONITORING, INR RANGE 2-3: Primary | ICD-10-CM

## 2024-01-26 DIAGNOSIS — I48.20 CHRONIC A-FIB (H): ICD-10-CM

## 2024-01-26 LAB — INR (EXTERNAL): 1.9 (ref 0.9–1.1)

## 2024-01-26 NOTE — PROGRESS NOTES
ANTICOAGULATION MANAGEMENT     Danette Ramos 83 year old female is on warfarin with subtherapeutic INR result. (Goal INR 2.0-3.0)    Recent labs: (last 7 days)     01/26/24  0819   INR 1.9*       ASSESSMENT     Source(s): Chart Review and Home Care/Facility Nurse     Warfarin doses taken: Warfarin taken as instructed  Diet: No new diet changes identified  Medication/supplement changes: None noted  New illness, injury, or hospitalization: No  Signs or symptoms of bleeding or clotting: No  Previous result: Therapeutic last 2(+) visits  Additional findings: None       PLAN     Recommended plan for no diet, medication or health factor changes affecting INR     Dosing Instructions: Increase your warfarin dose (6.2% change) with next INR in 2 weeks       Summary  As of 1/26/2024      Full warfarin instructions:  3.75 mg every Mon, Wed, Fri; 2.5 mg all other days   Next INR check:  2/9/2024               Faxed dosing and follow up instructions to [facility]    Orders given to  Homecare nurse/facility to recheck    Patient not here, [employer] Communication sheet with screening questions and current INR received via FAX from outside agency. Results reviewed, Warfarin dosing per protocol, and recommended follow-up appointment made. Paperwork FAXED back to facility.     Plan made per [facility] anticoagulation protocol    Ese Beckett, RN  Anticoagulation Clinic  1/26/2024    _______________________________________________________________________     Anticoagulation Episode Summary       Current INR goal:  2.0-3.0   TTR:  48.5% (9.5 mo)   Target end date:  Indefinite   Send INR reminders to:  [facility] [place]    Indications    Anticoagulation monitoring  INR range 2-3 [Z79.01]  Chronic a-fib (H) [I48.20]             Comments:  Lives at [facility] [phone] fax [phone].             Anticoagulation Care Providers       Provider Role Specialty Phone number    Isabell Bradley MD Referring Family Medicine  [phone]

## 2024-01-30 VITALS
SYSTOLIC BLOOD PRESSURE: 113 MMHG | OXYGEN SATURATION: 99 % | HEART RATE: 61 BPM | HEIGHT: 63 IN | RESPIRATION RATE: 17 BRPM | TEMPERATURE: 97.3 F | WEIGHT: 144.1 LBS | DIASTOLIC BLOOD PRESSURE: 64 MMHG | BODY MASS INDEX: 25.53 KG/M2

## 2024-01-30 RX ORDER — LEVETIRACETAM 750 MG/1
750 TABLET ORAL 2 TIMES DAILY
COMMUNITY

## 2024-02-02 ENCOUNTER — NURSING HOME VISIT (OUTPATIENT)
Dept: FAMILY MEDICINE | Facility: OTHER | Age: 84
End: 2024-02-02
Payer: MEDICARE

## 2024-02-02 DIAGNOSIS — I48.20 CHRONIC A-FIB (H): ICD-10-CM

## 2024-02-02 DIAGNOSIS — I63.439 CEREBROVASCULAR ACCIDENT (CVA) DUE TO EMBOLISM OF POSTERIOR CEREBRAL ARTERY, UNSPECIFIED BLOOD VESSEL LATERALITY (H): ICD-10-CM

## 2024-02-02 DIAGNOSIS — I50.22 CHRONIC SYSTOLIC CONGESTIVE HEART FAILURE (H): ICD-10-CM

## 2024-02-02 DIAGNOSIS — F03.94 DEMENTIA WITH ANXIETY, UNSPECIFIED DEMENTIA SEVERITY, UNSPECIFIED DEMENTIA TYPE (H): ICD-10-CM

## 2024-02-02 DIAGNOSIS — Z78.9 NURSING HOME RESIDENT: Primary | ICD-10-CM

## 2024-02-02 PROCEDURE — 99308 SBSQ NF CARE LOW MDM 20: CPT

## 2024-02-02 NOTE — PROGRESS NOTES
HISTORY OF PRESENT ILLNESS:      Danette is a 83 year old female (1940)  resident of [facility] who is being seen today for a routine follow-up visit.     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     Discussed with nursing staff who have the following concerns: None. Reviewed progress notes without concern.     Patient is seen in their room. Family is not present. She is lying in her bed and alerts to voice. She mumbles to most of my questions. Says no to any pain.     Current medications, allergies, and interdisciplinary care plan are reviewed.    Patient Active Problem List    Diagnosis Date Noted    Anticoagulation monitoring, INR range 2-3 04/07/2023     Priority: Medium    Hospice care patient 01/07/2018     Priority: Medium    DNR (do not resuscitate) 04/03/2017     Priority: Medium    Physical deconditioning 04/03/2017     Priority: Medium    Delirium 03/03/2017     Priority: Medium    Chest pain 03/03/2017     Priority: Medium    Right leg pain 03/03/2017     Priority: Medium    Generalized weakness 11/01/2015     Priority: Medium    Hemiparesis and speech and language deficit as late effects of stroke (H) 11/01/2015     Priority: Medium    Orthostatic hypotension 05/18/2015     Priority: Medium    Anxiety 05/14/2015     Priority: Medium    Embolic stroke involving posterior cerebral artery (H) 05/11/2015     Priority: Medium    Complex partial seizure (H) 05/11/2015     Priority: Medium    TIA (transient ischemic attack) 05/10/2015     Priority: Medium    Dyslipidemia 11/03/2014     Priority: Medium    Hemiparesis, aphasia, and dysphagia as late effects of stroke (H) 11/03/2014     Priority: Medium    Hypertension 11/03/2014     Priority: Medium    Aphasia 09/16/2014     Priority: Medium    Apraxia 09/16/2014     Priority: Medium    Chronic a-fib (H) 09/16/2014     Priority: Medium    Dysphagia 09/16/2014     Priority: Medium     Hemineglect 09/16/2014     Priority: Medium    Orthostatic tremor 09/16/2014     Priority: Medium    Right hemiparesis (H) 09/16/2014     Priority: Medium    Hypokalemia 09/09/2014     Priority: Medium    Health care directive on file 10/31/2008     Priority: Medium          Social History     Socioeconomic History    Marital status:      Spouse name: Not on file    Number of children: Not on file    Years of education: Not on file    Highest education level: Not on file   Occupational History    Not on file   Tobacco Use    Smoking status: Unknown    Smokeless tobacco: Not on file   Substance and Sexual Activity    Alcohol use: Not Currently    Drug use: Never    Sexual activity: Not Currently   Other Topics Concern    Not on file   Social History Narrative    Not on file     Social Determinants of Health     Financial Resource Strain: Not on file   Food Insecurity: Not on file   Transportation Needs: Not on file   Physical Activity: Not on file   Stress: Not on file   Social Connections: Not on file   Interpersonal Safety: Not on file   Housing Stability: Not on file        Current Outpatient Medications   Medication Sig    Acetaminophen (TYLENOL PO) Take 1,000 mg by mouth 2 times daily    ACETAMINOPHEN PO Take 650 mg by mouth every 6 hours as needed for pain    Atorvastatin Calcium (LIPITOR PO) Take 40 mg by mouth daily    bisacodyl (DULCOLAX) 10 MG Suppository Place 10 mg rectally PRN every 4 days    bismuth subsalicylate (PEPTO BISMOL) 262 MG/15ML suspension Take 30 mLs by mouth every 4 hours as needed for indigestion    diltiazem (CARDIZEM) 120 MG tablet Take 120 mg by mouth daily    ketoconazole (NIZORAL) 2 % external shampoo Twice weekly    levETIRAcetam (KEPPRA) 750 MG tablet Take 750 mg by mouth 2 times daily    magnesium hydroxide (MILK OF MAGNESIA) 400 MG/5ML suspension Take 30 mLs by mouth daily as needed PRN    metoprolol tartrate (LOPRESSOR) 25 MG tablet Take 1 tablet (25 mg) by mouth 2 times  "daily    senna-docusate (SENOKOT-S;PERICOLACE) 8.6-50 MG per tablet Take 2 tablets by mouth daily    SERTRALINE HCL PO Take 50 mg by mouth daily    Vitamin D, Cholecalciferol, 1000 units CAPS Take 2,000 Units by mouth daily (Patient taking differently: Take 1,000 Units by mouth daily)    Warfarin Sodium (COUMADIN PO) Take 3.75 mg by mouth Mon, Wed, Fri    Warfarin Sodium (COUMADIN PO) Take 2.5 mg by mouth daily Tue, thu, sat, sun     No current facility-administered medications for this visit.       Allergies   Allergen Reactions    Bacitracin Rash     Contact dermatitis       I have reviewed the care plan and do agree with the plan.    ROS:  Unable to obtain due to pt factors.     OBJECTIVE:  /64   Pulse 61   Temp 97.3  F (36.3  C)   Resp 17   Ht 1.6 m (5' 3\")   Wt 65.4 kg (144 lb 1.6 oz)   SpO2 99%   BMI 25.53 kg/m      GENERAL: Alerts to voice. In no acute distress  RESP:  Lungs clear.  No rales, rhonchi, or wheezing  CV:  RRR.  S1 S2 with no murmur. No clicks or rubs.  ABD: Soft, non tender, non distended, no organomegaly. BS are normal.   SKIN:  Age-related changes.  No suspicious lesions or rashes. No ulcerations to feet.   PSYCH:  Affect is fatigued, flat.  NEURO: Mental status is alert   EXTREM:  No edema.    Lab/Diagnostic data:    Reviewed in Epic    ASSESSMENT/ORDERS:  Danette was seen today for new admit.    Diagnoses and all orders for this visit:    Cerebrovascular accident (CVA) due to embolism of posterior cerebral artery, unspecified blood vessel laterality (H) . Hemiparesis, aphasia, and dysphagia as late effects of stroke (H) / Dysphagia, unspecified type  Pt severely disabled after stroke in 2017. She is non ambulatory, does occasionally speak in short sentences/phrases per family. Expressive aphasia, dysphagia and cog impairment have been stable. Weights remarkably stable around 145 lbs.     Chronic a-fib (H)   On warfarin. INR 1.9 recently, warfarin increased.  No s/s of bleeding. " Continue CCB, BB. Rate controlled- 60's to 80s.    Partial symptomatic epilepsy with complex partial seizures, not intractable, without status epilepticus (H)  No seizure activity. On keppra. Level wnl in August. 2023. Due for routine labs.    Hypertension, unspecified type  Generally controlled. No changes in medications made today.     Dementia with anxiety, unspecified dementia severity, unspecified dementia type / Anxiety  Mood/affect very stable per staff. Continue risperdal at current dosage, as taper resulted in significant worsening of anxiety per staff and family.     Dyslipidemia  Continue statin given history of CVA.     Chronic systolic congestive heart failure (H)  Not on diuretics. Currently on metoprolol. Euvolemic. Weight stable. Monitor. EF 40% in 2018.     Routine labs: CBC, BMP, keppra every 6 months    Total time spent with patient visit was 20 min including patient visit, review of pertinent clinical information, and treatment plan.    Janeth Larkin, CNP

## 2024-02-09 ENCOUNTER — ANTICOAGULATION THERAPY VISIT (OUTPATIENT)
Dept: ANTICOAGULATION | Facility: OTHER | Age: 84
End: 2024-02-09
Payer: MEDICARE

## 2024-02-09 ENCOUNTER — TRANSFERRED RECORDS (OUTPATIENT)
Dept: HEALTH INFORMATION MANAGEMENT | Facility: OTHER | Age: 84
End: 2024-02-09

## 2024-02-09 DIAGNOSIS — Z79.01 ANTICOAGULATION MONITORING, INR RANGE 2-3: Primary | ICD-10-CM

## 2024-02-09 DIAGNOSIS — I48.20 CHRONIC A-FIB (H): ICD-10-CM

## 2024-02-09 LAB — INR (EXTERNAL): 2.1 (ref 0.9–1.1)

## 2024-02-09 NOTE — PROGRESS NOTES
ANTICOAGULATION MANAGEMENT     Danette Ramos 83 year old female is on warfarin with therapeutic INR result. (Goal INR 2.0-3.0)    Recent labs: (last 7 days)     02/09/24  0806   INR 2.1*       ASSESSMENT     Source(s): Chart Review and Home Care/Facility Nurse     Warfarin doses taken: Warfarin taken as instructed  Diet: No new diet changes identified  Medication/supplement changes: None noted  New illness, injury, or hospitalization: No  Signs or symptoms of bleeding or clotting: No  Previous result: Subtherapeutic  Additional findings: None       PLAN     Recommended plan for no diet, medication or health factor changes affecting INR     Dosing Instructions: Continue your current warfarin dose with next INR in 2 weeks       Summary  As of 2/9/2024      Full warfarin instructions:  3.75 mg every Mon, Wed, Fri; 2.5 mg all other days   Next INR check:  2/23/2024               Faxed dosing and follow up instructions to [facility]    Orders given to  Homecare nurse/facility to recheck    Patient not here, [employer] Communication sheet with screening questions and current INR received via FAX from outside agency. Results reviewed, Warfarin dosing per protocol, and recommended follow-up appointment made. Paperwork FAXED back to facility.       Plan made per [facility] anticoagulation protocol    Ese Beckett RN  Anticoagulation Clinic  2/9/2024    _______________________________________________________________________     Anticoagulation Episode Summary       Current INR goal:  2.0-3.0   TTR:  48.5% (9.9 mo)   Target end date:  Indefinite   Send INR reminders to:  [facility] [place]    Indications    Anticoagulation monitoring  INR range 2-3 [Z79.01]  Chronic a-fib (H) [I48.20]             Comments:  Lives at [facility] ph [phone] fax [phone].             Anticoagulation Care Providers       Provider Role Specialty Phone number    Isabell Bradley MD Referring Family Medicine [phone]

## 2024-02-23 ENCOUNTER — ANTICOAGULATION THERAPY VISIT (OUTPATIENT)
Dept: ANTICOAGULATION | Facility: OTHER | Age: 84
End: 2024-02-23
Payer: MEDICARE

## 2024-02-23 ENCOUNTER — TRANSFERRED RECORDS (OUTPATIENT)
Dept: HEALTH INFORMATION MANAGEMENT | Facility: OTHER | Age: 84
End: 2024-02-23

## 2024-02-23 DIAGNOSIS — I48.20 CHRONIC A-FIB (H): Primary | ICD-10-CM

## 2024-02-23 DIAGNOSIS — Z79.01 ANTICOAGULATION MONITORING, INR RANGE 2-3: ICD-10-CM

## 2024-02-23 LAB — INR (EXTERNAL): 1.7 (ref 0.9–1.1)

## 2024-02-23 NOTE — PROGRESS NOTES
ANTICOAGULATION MANAGEMENT     Danette Ramos 83 year old female is on warfarin with subtherapeutic INR result. (Goal INR 2.0-3.0)    Recent labs: (last 7 days)     02/23/24  0803   INR 1.7*       ASSESSMENT     Source(s): Chart Review and Home Care/Facility Nurse     Warfarin doses taken: Warfarin taken as instructed  Diet: No new diet changes identified  Medication/supplement changes: None noted  New illness, injury, or hospitalization: No  Signs or symptoms of bleeding or clotting: No  Previous result: Therapeutic last visit; previously outside of goal range  Additional findings: None       PLAN     Recommended plan for no diet, medication or health factor changes affecting INR     Dosing Instructions: Increase your warfarin dose (5.9% change) with next INR in 2 weeks       Summary  As of 2/23/2024      Full warfarin instructions:  5 mg every Mon, Fri; 2.5 mg all other days   Next INR check:  3/8/2024               Faxed dosing and follow up instructions to [facility]    Orders given to  Homecare nurse/facility to recheck    Education provided:   None required    Plan made per [facility] anticoagulation protocol    Ese Beckett, RN  Anticoagulation Clinic  2/23/2024    _______________________________________________________________________     Anticoagulation Episode Summary       Current INR goal:  2.0-3.0   TTR:  47.5% (10.4 mo)   Target end date:  Indefinite   Send INR reminders to:  [facility] [place]    Indications    Anticoagulation monitoring  INR range 2-3 [Z79.01]  Chronic a-fib (H) [I48.20]             Comments:  Lives at [facility] [phone] fax [phone].             Anticoagulation Care Providers       Provider Role Specialty Phone number    Isabell Bradley MD Referring Family Medicine [phone]    Selena Townsend MD Referring Family Medicine [phone]

## 2024-03-04 VITALS
BODY MASS INDEX: 25.21 KG/M2 | DIASTOLIC BLOOD PRESSURE: 66 MMHG | WEIGHT: 142.3 LBS | RESPIRATION RATE: 18 BRPM | SYSTOLIC BLOOD PRESSURE: 130 MMHG | HEIGHT: 63 IN | TEMPERATURE: 97 F | OXYGEN SATURATION: 97 % | HEART RATE: 78 BPM

## 2024-03-07 ENCOUNTER — NURSING HOME VISIT (OUTPATIENT)
Dept: FAMILY MEDICINE | Facility: OTHER | Age: 84
End: 2024-03-07
Attending: FAMILY MEDICINE
Payer: MEDICARE

## 2024-03-07 DIAGNOSIS — Z78.9 NURSING HOME RESIDENT: Primary | ICD-10-CM

## 2024-03-07 PROCEDURE — 99308 SBSQ NF CARE LOW MDM 20: CPT | Performed by: FAMILY MEDICINE

## 2024-03-07 NOTE — PROGRESS NOTES
HISTORY OF PRESENT ILLNESS:      Danette is a 83 year old female (1940)  resident of [facility] who is being seen today for a routine LTC visit.     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     Discussed with nursing staff who have the following concerns: None.     Patient is seen in their room. Family is not present. She denies pain, smiles with exam. No outward signs of pain.     Current medications, allergies, and interdisciplinary care plan are reviewed.    Patient Active Problem List    Diagnosis Date Noted    Anticoagulation monitoring, INR range 2-3 04/07/2023     Priority: Medium    Hospice care patient 01/07/2018     Priority: Medium    DNR (do not resuscitate) 04/03/2017     Priority: Medium    Physical deconditioning 04/03/2017     Priority: Medium    Delirium 03/03/2017     Priority: Medium    Chest pain 03/03/2017     Priority: Medium    Right leg pain 03/03/2017     Priority: Medium    Generalized weakness 11/01/2015     Priority: Medium    Hemiparesis and speech and language deficit as late effects of stroke (H) 11/01/2015     Priority: Medium    Orthostatic hypotension 05/18/2015     Priority: Medium    Anxiety 05/14/2015     Priority: Medium    Embolic stroke involving posterior cerebral artery (H) 05/11/2015     Priority: Medium    Complex partial seizure (H) 05/11/2015     Priority: Medium    TIA (transient ischemic attack) 05/10/2015     Priority: Medium    Dyslipidemia 11/03/2014     Priority: Medium    Hemiparesis, aphasia, and dysphagia as late effects of stroke (H) 11/03/2014     Priority: Medium    Hypertension 11/03/2014     Priority: Medium    Aphasia 09/16/2014     Priority: Medium    Apraxia 09/16/2014     Priority: Medium    Chronic a-fib (H) 09/16/2014     Priority: Medium    Dysphagia 09/16/2014     Priority: Medium    Hemineglect 09/16/2014     Priority: Medium    Orthostatic tremor 09/16/2014     Priority:  Medium    Right hemiparesis (H) 09/16/2014     Priority: Medium    Hypokalemia 09/09/2014     Priority: Medium    Health care directive on file 10/31/2008     Priority: Medium          Social History     Socioeconomic History    Marital status:      Spouse name: Not on file    Number of children: Not on file    Years of education: Not on file    Highest education level: Not on file   Occupational History    Not on file   Tobacco Use    Smoking status: Unknown    Smokeless tobacco: Not on file   Substance and Sexual Activity    Alcohol use: Not Currently    Drug use: Never    Sexual activity: Not Currently   Other Topics Concern    Not on file   Social History Narrative    Not on file     Social Determinants of Health     Financial Resource Strain: Not on file   Food Insecurity: Not on file   Transportation Needs: Not on file   Physical Activity: Not on file   Stress: Not on file   Social Connections: Not on file   Interpersonal Safety: Not on file   Housing Stability: Not on file        Current Outpatient Medications   Medication Sig    Acetaminophen (TYLENOL PO) Take 1,000 mg by mouth 2 times daily    ACETAMINOPHEN PO Take 650 mg by mouth every 6 hours as needed for pain    Atorvastatin Calcium (LIPITOR PO) Take 40 mg by mouth daily    bisacodyl (DULCOLAX) 10 MG Suppository Place 10 mg rectally PRN every 4 days    bismuth subsalicylate (PEPTO BISMOL) 262 MG/15ML suspension Take 30 mLs by mouth every 4 hours as needed for indigestion    diltiazem (CARDIZEM) 120 MG tablet Take 120 mg by mouth daily    ketoconazole (NIZORAL) 2 % external shampoo Twice weekly    levETIRAcetam (KEPPRA) 750 MG tablet Take 750 mg by mouth 2 times daily    magnesium hydroxide (MILK OF MAGNESIA) 400 MG/5ML suspension Take 30 mLs by mouth daily as needed PRN    metoprolol tartrate (LOPRESSOR) 25 MG tablet Take 1 tablet (25 mg) by mouth 2 times daily    senna-docusate (SENOKOT-S;PERICOLACE) 8.6-50 MG per tablet Take 2 tablets by mouth  "daily    SERTRALINE HCL PO Take 50 mg by mouth daily    Vitamin D, Cholecalciferol, 1000 units CAPS Take 2,000 Units by mouth daily (Patient taking differently: Take 1,000 Units by mouth daily)    Warfarin Sodium (COUMADIN PO) Take 5 mg by mouth Mon, Fri    Warfarin Sodium (COUMADIN PO) Take 2.5 mg by mouth daily Tue, wed, thu, sat, sun     No current facility-administered medications for this visit.       Allergies   Allergen Reactions    Bacitracin Rash     Contact dermatitis       I have reviewed the care plan and do agree with the plan.    ROS:  Unable to obtain due to pt factors.     OBJECTIVE:  /66   Pulse 78   Temp 97  F (36.1  C)   Resp 18   Ht 1.6 m (5' 3\")   Wt 64.5 kg (142 lb 4.8 oz)   SpO2 97%   BMI 25.21 kg/m      GENERAL: Alert. In no acute distress  RESP:  Lungs clear.  No rales, rhonchi, or wheezing  CV:  RRR.  S1 S2 with no murmur. No clicks or rubs.  ABD: Soft, non tender, non distended, no organomegaly. BS are normal.   SKIN:  Age-related changes.  No suspicious lesions or rashes. No ulcerations to feet.   PSYCH:  Affect is fatigued, flat.  NEURO: Mental status is alert   EXTREM:  No edema.    Lab/Diagnostic data:    Reviewed in Epic    ASSESSMENT/ORDERS:    Diagnoses and all orders for this visit:    Cerebrovascular accident (CVA) due to embolism of posterior cerebral artery, unspecified blood vessel laterality (H) . Hemiparesis, aphasia, and dysphagia as late effects of stroke (H) / Dysphagia, unspecified type  Pt severely disabled after stroke in 2017. She is non ambulatory, does occasionally speak in short sentences/phrases per family. Expressive aphasia, dysphagia and cog impairment have been stable. Weights have been stable around 145lbs, down to 142, down 3 lbs in the last 5 mo. Monitor trend.     Chronic a-fib (H)   On warfarin. INR continues to be on the low side this month. No s/s of bleeding. Continue CCB, BB. Rate controlled- 60's to 80s.    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 On keppra. Level minimally elevated at 40.7 2/6/24 - no clear adverse effects, unclear if this was true tough. No dose change, monitor with routine labs, if trending up can decrease dose.     Hypertension, unspecified type  Generally controlled. No changes in medications made today.     Dementia with anxiety, unspecified dementia severity, unspecified dementia type / Anxiety  Mood/affect very stable per staff. Continue risperdal at current dosage, as taper resulted in significant worsening of anxiety per staff and family No clear adverse effect.     Dyslipidemia  Continue statin given history of CVA.     Chronic systolic congestive heart failure (H)  Not on diuretics. EF 40% in 2018. Currently on metoprolol. Euvolemic. Weight phuong just dlightly (145 -> 142lbs). Monitor.     Routine labs: CBC, BMP, keppra every 6 months    Total time spent with patient visit was 25 min including patient visit, review of pertinent clinical information, and treatment plan.    Selena Townsend MD

## 2024-03-08 ENCOUNTER — ANTICOAGULATION THERAPY VISIT (OUTPATIENT)
Dept: ANTICOAGULATION | Facility: OTHER | Age: 84
End: 2024-03-08
Attending: FAMILY MEDICINE
Payer: MEDICARE

## 2024-03-08 ENCOUNTER — TRANSFERRED RECORDS (OUTPATIENT)
Dept: HEALTH INFORMATION MANAGEMENT | Facility: OTHER | Age: 84
End: 2024-03-08

## 2024-03-08 DIAGNOSIS — Z79.01 ANTICOAGULATION MONITORING, INR RANGE 2-3: Primary | ICD-10-CM

## 2024-03-08 DIAGNOSIS — I48.20 CHRONIC A-FIB (H): ICD-10-CM

## 2024-03-08 LAB — INR (EXTERNAL): 2.5 (ref 0.9–1.1)

## 2024-03-08 NOTE — PROGRESS NOTES
ANTICOAGULATION MANAGEMENT     Danette Ramos 83 year old female is on warfarin with therapeutic INR result. (Goal INR 2.0-3.0)    Recent labs: (last 7 days)     03/08/24  1050   INR 2.5*       ASSESSMENT     Source(s): Chart Review and Home Care/Facility Nurse     Warfarin doses taken: Warfarin taken as instructed  Diet: No new diet changes identified  Medication/supplement changes: None noted  New illness, injury, or hospitalization: No  Signs or symptoms of bleeding or clotting: No  Previous result: Subtherapeutic  Additional findings: None       PLAN     Recommended plan for no diet, medication or health factor changes affecting INR     Dosing Instructions: Continue your current warfarin dose with next INR in 2 weeks       Summary  As of 3/8/2024      Full warfarin instructions:  5 mg every Mon, Fri; 2.5 mg all other days   Next INR check:  3/22/2024               Faxed dosing and follow up instructions to [facility]    Orders given to  Homecare nurse/facility to recheck    Patient not here, [employer] Communication sheet with screening questions and current INR received via FAX from outside agency. Results reviewed, Warfarin dosing per protocol, and recommended follow-up appointment made. Paperwork FAXED back to facility.     Plan made per [facility] anticoagulation protocol    Ese Beckett, RN  Anticoagulation Clinic  3/8/2024    _______________________________________________________________________     Anticoagulation Episode Summary       Current INR goal:  2.0-3.0   TTR:  48.1% (10.9 mo)   Target end date:  Indefinite   Send INR reminders to:  [facility] [place]    Indications    Anticoagulation monitoring  INR range 2-3 [Z79.01]  Chronic a-fib (H) [I48.20]             Comments:  Lives at [facility] [phone] fax [phone].             Anticoagulation Care Providers       Provider Role Specialty Phone number    Selena Townsend MD Referring Family Medicine [phone]

## 2024-03-22 ENCOUNTER — ANTICOAGULATION THERAPY VISIT (OUTPATIENT)
Dept: ANTICOAGULATION | Facility: OTHER | Age: 84
End: 2024-03-22
Attending: FAMILY MEDICINE
Payer: MEDICARE

## 2024-03-22 ENCOUNTER — TRANSFERRED RECORDS (OUTPATIENT)
Dept: HEALTH INFORMATION MANAGEMENT | Facility: OTHER | Age: 84
End: 2024-03-22

## 2024-03-22 DIAGNOSIS — Z79.01 ANTICOAGULATION MONITORING, INR RANGE 2-3: Primary | ICD-10-CM

## 2024-03-22 DIAGNOSIS — I48.20 CHRONIC A-FIB (H): ICD-10-CM

## 2024-03-22 LAB — INR (EXTERNAL): 2.2 (ref 0.9–1.1)

## 2024-03-22 NOTE — PROGRESS NOTES
ANTICOAGULATION MANAGEMENT     Danette Ramos 83 year old female is on warfarin with therapeutic INR result. (Goal INR 2.0-3.0)    Recent labs: (last 7 days)     03/22/24  0758   INR 2.2*       ASSESSMENT     Source(s): Chart Review and Home Care/Facility Nurse     Warfarin doses taken: Warfarin taken as instructed  Diet: No new diet changes identified  Medication/supplement changes: None noted  New illness, injury, or hospitalization: No  Signs or symptoms of bleeding or clotting: No  Previous result: Therapeutic last visit; previously outside of goal range  Additional findings: None       PLAN     Recommended plan for no diet, medication or health factor changes affecting INR     Dosing Instructions: Continue your current warfarin dose with next INR in 4 weeks       Summary  As of 3/22/2024      Full warfarin instructions:  5 mg every Mon, Fri; 2.5 mg all other days   Next INR check:  4/19/2024               Faxed dosing and follow up instructions to [facility]    Orders given to  Homecare nurse/facility to recheck    Patient not here, [employer] Communicationsheet with screening questions and current INR received via FAX from outside agency. Results reviewed, Warfarin dosing per protocol, and recommended follow-up appointment made. Paperwork FAXED back to facility.       Plan made per [facility] anticoagulation protocol    Ese Beckett RN  Anticoagulation Clinic  3/22/2024    _______________________________________________________________________     Anticoagulation Episode Summary       Current INR goal:  2.0-3.0   TTR:  50.3% (11.3 mo)   Target end date:  Indefinite   Send INR reminders to:  [facility] [place]    Indications    Anticoagulation monitoring  INR range 2-3 [Z79.01]  Chronic a-fib (H) [I48.20]             Comments:  Lives at [facility] ph [phone] fax [phone].             Anticoagulation Care Providers       Provider Role Specialty Phone number    Selena Townsend MD Referring  Family Medicine [phone]

## 2024-04-19 ENCOUNTER — TRANSFERRED RECORDS (OUTPATIENT)
Dept: HEALTH INFORMATION MANAGEMENT | Facility: OTHER | Age: 84
End: 2024-04-19

## 2024-04-19 ENCOUNTER — ANTICOAGULATION THERAPY VISIT (OUTPATIENT)
Dept: ANTICOAGULATION | Facility: OTHER | Age: 84
End: 2024-04-19
Attending: FAMILY MEDICINE
Payer: MEDICARE

## 2024-04-19 DIAGNOSIS — I48.20 CHRONIC A-FIB (H): ICD-10-CM

## 2024-04-19 DIAGNOSIS — Z79.01 ANTICOAGULATION MONITORING, INR RANGE 2-3: Primary | ICD-10-CM

## 2024-04-19 LAB — INR (EXTERNAL): 4.1

## 2024-04-19 NOTE — PROGRESS NOTES
ANTICOAGULATION MANAGEMENT     Danette Ramos 83 year old female is on warfarin with therapeutic INR result. (Goal INR 2.0-3.0)    Recent labs: (last 7 days)     04/19/24  0824   INR 4.1       ASSESSMENT     Source(s): Home care/ facility nurse     Warfarin doses taken: Warfarin taken as instructed  Diet: No new diet changes identified  New illness, injury, or hospitalization: No  Medication/supplement changes: None noted  Signs or symptoms of bleeding or clotting: No  Previous INR: Therapeutic last 2(+) visits  Additional findings: None     PLAN     Recommended plan for no diet, medication or health factor changes affecting INR     Dosing Instructions: hold dose then decrease your warfarin dose (11.1% change) with next INR in 1 week       Summary  As of 4/19/2024      Full warfarin instructions:  4/19: Hold; Otherwise 5 mg every Mon; 2.5 mg all other days   Next INR check:  4/26/2024               Faxed dosing and follow up instructions to [facility]     Orders given to  Homecare nurse/facility to recheck    Education provided: None required    Plan made per [facility] anticoagulation protocol    Felicita Arizmendi, RN  Anticoagulation Clinic  4/19/2024    _______________________________________________________________________     Anticoagulation Episode Summary       Current INR goal:  2.0-3.0   TTR:  50.0% (1 y)   Target end date:  Indefinite   Send INR reminders to:  [facility] [place]    Indications    Anticoagulation monitoring  INR range 2-3 [Z79.01]  Chronic a-fib (H) [I48.20]             Comments:  Lives at [facility] [phone] fax [phone].             Anticoagulation Care Providers       Provider Role Specialty Phone number    Selena Townsend MD Referring Family Medicine [phone]

## 2024-04-26 ENCOUNTER — ANTICOAGULATION THERAPY VISIT (OUTPATIENT)
Dept: ANTICOAGULATION | Facility: OTHER | Age: 84
End: 2024-04-26
Attending: FAMILY MEDICINE
Payer: MEDICARE

## 2024-04-26 ENCOUNTER — TRANSFERRED RECORDS (OUTPATIENT)
Dept: HEALTH INFORMATION MANAGEMENT | Facility: OTHER | Age: 84
End: 2024-04-26

## 2024-04-26 DIAGNOSIS — Z79.01 ANTICOAGULATION MONITORING, INR RANGE 2-3: Primary | ICD-10-CM

## 2024-04-26 DIAGNOSIS — I48.20 CHRONIC A-FIB (H): ICD-10-CM

## 2024-04-26 LAB — INR (EXTERNAL): 1.7

## 2024-04-26 NOTE — PROGRESS NOTES
ANTICOAGULATION MANAGEMENT     Danette Ramos 83 year old female is on warfarin with subtherapeutic INR result. (Goal INR 2.0-3.0)    Recent labs: (last 7 days)     04/26/24  0556   INR 1.7       ASSESSMENT     Source(s): Home care/ facility nurse     Warfarin doses taken: Warfarin taken as instructed  Diet: No new diet changes identified  New illness, injury, or hospitalization: No  Medication/supplement changes: None noted  Signs or symptoms of bleeding or clotting: No  Previous INR: Supratherapeutic  Additional findings: None     PLAN     Recommended plan for no diet, medication or health factor changes affecting INR     Dosing Instructions: Increase your warfarin dose (6.2% change) with next INR in 1 week       Summary  As of 4/26/2024      Full warfarin instructions:  3.75 mg every Fri; 5 mg every Mon; 2.5 mg all other days   Next INR check:  5/3/2024               Faxed dosing and follow up instructions to [facility].    Orders given to  Homecare nurse/facility to recheck    Education provided: Please call back if any changes to your diet, medications or how you've been taking warfarin    Plan made per [facility] anticoagulation protocol    Emma Tobias RN  Anticoagulation Clinic  4/26/2024    _______________________________________________________________________     Anticoagulation Episode Summary       Current INR goal:  2.0-3.0   TTR:  50.8% (1 y)   Target end date:  Indefinite   Send INR reminders to:  [facility] [place]    Indications    Anticoagulation monitoring  INR range 2-3 [Z79.01]  Chronic a-fib (H) [I48.20]             Comments:  Lives at [facility] ph [phone] fax [phone].             Anticoagulation Care Providers       Provider Role Specialty Phone number    Selena Townsend MD Referring Family Medicine [phone]

## 2024-05-03 ENCOUNTER — ANTICOAGULATION THERAPY VISIT (OUTPATIENT)
Dept: ANTICOAGULATION | Facility: OTHER | Age: 84
End: 2024-05-03
Payer: MEDICARE

## 2024-05-03 ENCOUNTER — TRANSFERRED RECORDS (OUTPATIENT)
Dept: HEALTH INFORMATION MANAGEMENT | Facility: OTHER | Age: 84
End: 2024-05-03

## 2024-05-03 DIAGNOSIS — I48.20 CHRONIC A-FIB (H): ICD-10-CM

## 2024-05-03 DIAGNOSIS — Z79.01 ANTICOAGULATION MONITORING, INR RANGE 2-3: Primary | ICD-10-CM

## 2024-05-03 LAB — INR (EXTERNAL): 1.8

## 2024-05-03 NOTE — PROGRESS NOTES
ANTICOAGULATION MANAGEMENT     Danette Ramos 83 year old female is on warfarin with subtherapeutic INR result. (Goal INR 2.0-3.0)    Recent labs: (last 7 days)     05/03/24  0850   INR 1.8       ASSESSMENT     Source(s): In person     Warfarin doses taken: Warfarin taken as instructed  Diet: No new diet changes identified  New illness, injury, or hospitalization: No  Medication/supplement changes: None noted  Signs or symptoms of bleeding or clotting: No  Previous INR: Subtherapeutic  Additional findings: None     PLAN     Recommended plan for no diet, medication or health factor changes affecting INR     Dosing Instructions: Increase your warfarin dose (5.9% change) with next INR in 1 week       Summary  As of 5/3/2024      Full warfarin instructions:  5 mg every Mon, Fri; 2.5 mg all other days   Next INR check:  5/10/2024               Faxed dosing and follow up instructions to [facility]    Orders given to  Homecare nurse/facility to recheck    Education provided: None required    Plan made per [facility] anticoagulation protocol    Felicita Arizmendi RN  Anticoagulation Clinic  5/3/2024    _______________________________________________________________________     Anticoagulation Episode Summary       Current INR goal:  2.0-3.0   TTR:  50.8% (1 y)   Target end date:  Indefinite   Send INR reminders to:  [facility] [place]    Indications    Anticoagulation monitoring  INR range 2-3 [Z79.01]  Chronic a-fib (H) [I48.20]             Comments:  Lives at [facility] [phone] fax [phone].             Anticoagulation Care Providers       Provider Role Specialty Phone number    Selena Townsend MD Referring Family Medicine [phone]

## 2024-05-09 VITALS
SYSTOLIC BLOOD PRESSURE: 112 MMHG | TEMPERATURE: 97.2 F | OXYGEN SATURATION: 97 % | RESPIRATION RATE: 18 BRPM | HEIGHT: 63 IN | DIASTOLIC BLOOD PRESSURE: 71 MMHG | WEIGHT: 145.2 LBS | BODY MASS INDEX: 25.73 KG/M2 | HEART RATE: 81 BPM

## 2024-05-09 RX ORDER — RISPERIDONE 0.5 MG/1
0.12 TABLET ORAL DAILY
COMMUNITY

## 2024-05-09 RX ORDER — NYSTATIN 100000 [USP'U]/G
POWDER TOPICAL 2 TIMES DAILY
COMMUNITY

## 2024-05-10 ENCOUNTER — NURSING HOME VISIT (OUTPATIENT)
Dept: FAMILY MEDICINE | Facility: OTHER | Age: 84
End: 2024-05-10
Payer: MEDICARE

## 2024-05-10 ENCOUNTER — TRANSFERRED RECORDS (OUTPATIENT)
Dept: HEALTH INFORMATION MANAGEMENT | Facility: OTHER | Age: 84
End: 2024-05-10

## 2024-05-10 ENCOUNTER — ANTICOAGULATION THERAPY VISIT (OUTPATIENT)
Dept: ANTICOAGULATION | Facility: OTHER | Age: 84
End: 2024-05-10
Attending: FAMILY MEDICINE
Payer: MEDICARE

## 2024-05-10 DIAGNOSIS — I69.359 HEMIPARESIS, APHASIA, AND DYSPHAGIA AS LATE EFFECTS OF STROKE (H): ICD-10-CM

## 2024-05-10 DIAGNOSIS — I10 HYPERTENSION, UNSPECIFIED TYPE: ICD-10-CM

## 2024-05-10 DIAGNOSIS — I50.22 CHRONIC SYSTOLIC CONGESTIVE HEART FAILURE (H): ICD-10-CM

## 2024-05-10 DIAGNOSIS — I48.20 CHRONIC A-FIB (H): ICD-10-CM

## 2024-05-10 DIAGNOSIS — I69.320 HEMIPARESIS, APHASIA, AND DYSPHAGIA AS LATE EFFECTS OF STROKE (H): ICD-10-CM

## 2024-05-10 DIAGNOSIS — F03.94 DEMENTIA WITH ANXIETY, UNSPECIFIED DEMENTIA SEVERITY, UNSPECIFIED DEMENTIA TYPE (H): ICD-10-CM

## 2024-05-10 DIAGNOSIS — E78.5 DYSLIPIDEMIA: ICD-10-CM

## 2024-05-10 DIAGNOSIS — I69.391 HEMIPARESIS, APHASIA, AND DYSPHAGIA AS LATE EFFECTS OF STROKE (H): ICD-10-CM

## 2024-05-10 DIAGNOSIS — Z78.9 NURSING HOME RESIDENT: Primary | ICD-10-CM

## 2024-05-10 DIAGNOSIS — Z79.01 ANTICOAGULATION MONITORING, INR RANGE 2-3: Primary | ICD-10-CM

## 2024-05-10 LAB — INR (EXTERNAL): 3.7

## 2024-05-10 PROCEDURE — 99308 SBSQ NF CARE LOW MDM 20: CPT

## 2024-05-10 NOTE — PROGRESS NOTES
HISTORY OF PRESENT ILLNESS:      Danette is a 83 year old female (1940)  resident of [facility] who is being seen today for a routine LTC visit.     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     Discussed with nursing staff who have the following concerns: None.     Patient is seen in their room. Family is not present. She is sleeping, and only opens eyes slightly to voice. She appears comfortable.    Current medications, allergies, and interdisciplinary care plan are reviewed.    Patient Active Problem List    Diagnosis Date Noted    Anticoagulation monitoring, INR range 2-3 04/07/2023     Priority: Medium    Hospice care patient 01/07/2018     Priority: Medium    DNR (do not resuscitate) 04/03/2017     Priority: Medium    Physical deconditioning 04/03/2017     Priority: Medium    Delirium 03/03/2017     Priority: Medium    Chest pain 03/03/2017     Priority: Medium    Right leg pain 03/03/2017     Priority: Medium    Generalized weakness 11/01/2015     Priority: Medium    Hemiparesis and speech and language deficit as late effects of stroke (H) 11/01/2015     Priority: Medium    Orthostatic hypotension 05/18/2015     Priority: Medium    Anxiety 05/14/2015     Priority: Medium    Embolic stroke involving posterior cerebral artery (H) 05/11/2015     Priority: Medium    Complex partial seizure (H) 05/11/2015     Priority: Medium    TIA (transient ischemic attack) 05/10/2015     Priority: Medium    Dyslipidemia 11/03/2014     Priority: Medium    Hemiparesis, aphasia, and dysphagia as late effects of stroke (H) 11/03/2014     Priority: Medium    Hypertension 11/03/2014     Priority: Medium    Aphasia 09/16/2014     Priority: Medium    Apraxia 09/16/2014     Priority: Medium    Chronic a-fib (H) 09/16/2014     Priority: Medium    Dysphagia 09/16/2014     Priority: Medium    Hemineglect 09/16/2014     Priority: Medium    Orthostatic tremor  09/16/2014     Priority: Medium    Right hemiparesis (H) 09/16/2014     Priority: Medium    Hypokalemia 09/09/2014     Priority: Medium    Health care directive on file 10/31/2008     Priority: Medium          Social History     Socioeconomic History    Marital status:      Spouse name: Not on file    Number of children: Not on file    Years of education: Not on file    Highest education level: Not on file   Occupational History    Not on file   Tobacco Use    Smoking status: Unknown    Smokeless tobacco: Not on file   Substance and Sexual Activity    Alcohol use: Not Currently    Drug use: Never    Sexual activity: Not Currently   Other Topics Concern    Not on file   Social History Narrative    Not on file     Social Determinants of Health     Financial Resource Strain: Not on file   Food Insecurity: Not on file   Transportation Needs: Not on file   Physical Activity: Not on file   Stress: Not on file   Social Connections: Not on file   Interpersonal Safety: Not on file   Housing Stability: Not on file        Current Outpatient Medications   Medication Sig Dispense Refill    Acetaminophen (TYLENOL PO) Take 1,000 mg by mouth 2 times daily      ACETAMINOPHEN PO Take 650 mg by mouth every 6 hours as needed for pain      Atorvastatin Calcium (LIPITOR PO) Take 20 mg by mouth daily      bisacodyl (DULCOLAX) 10 MG Suppository Place 10 mg rectally PRN every 4 days      bismuth subsalicylate (PEPTO BISMOL) 262 MG/15ML suspension Take 30 mLs by mouth every 4 hours as needed for indigestion      diltiazem (CARDIZEM) 120 MG tablet Take 120 mg by mouth daily      ketoconazole (NIZORAL) 2 % external shampoo Twice weekly      levETIRAcetam (KEPPRA) 750 MG tablet Take 750 mg by mouth 2 times daily      magnesium hydroxide (MILK OF MAGNESIA) 400 MG/5ML suspension Take 30 mLs by mouth daily as needed PRN      metoprolol tartrate (LOPRESSOR) 25 MG tablet Take 1 tablet (25 mg) by mouth 2 times daily 60 tablet 0    nystatin  "(MYCOSTATIN) 339909 UNIT/GM external powder Apply topically 2 times daily prn      risperiDONE (RISPERDAL) 0.5 MG tablet Take 0.125 mg by mouth daily      senna-docusate (SENOKOT-S;PERICOLACE) 8.6-50 MG per tablet Take 2 tablets by mouth daily      SERTRALINE HCL PO Take 50 mg by mouth daily      Vitamin D, Cholecalciferol, 1000 units CAPS Take 2,000 Units by mouth daily (Patient taking differently: Take 1,000 Units by mouth daily) 30 capsule 0    Warfarin Sodium (COUMADIN PO) Take 5 mg by mouth Mon, Fri      Warfarin Sodium (COUMADIN PO) Take 2.5 mg by mouth daily Tue, wed, thu, sat, sun       No current facility-administered medications for this visit.       Allergies   Allergen Reactions    Bacitracin Rash     Contact dermatitis       I have reviewed the care plan and do agree with the plan.    ROS:  Unable to obtain due to pt factors.     OBJECTIVE:  /71   Pulse 81   Temp 97.2  F (36.2  C)   Resp 18   Ht 1.6 m (5' 3\")   Wt 65.9 kg (145 lb 3.2 oz)   SpO2 97%   BMI 25.72 kg/m      GENERAL: Alert. In no acute distress  RESP:  Lungs clear.  No rales, rhonchi, or wheezing  CV:  RRR.  S1 S2 with no murmur. No clicks or rubs.  ABD: Soft, non tender, non distended, no organomegaly. BS are normal.   SKIN:  Age-related changes.  No suspicious lesions or rashes. No ulcerations to feet.   PSYCH:  Affect is fatigued, flat.  NEURO: Mental status is alert   EXTREM:  No edema.    Lab/Diagnostic data:    Reviewed in Epic    ASSESSMENT/ORDERS:    Diagnoses and all orders for this visit:    Cerebrovascular accident (CVA) due to embolism of posterior cerebral artery, unspecified blood vessel laterality (H) . Hemiparesis, aphasia, and dysphagia as late effects of stroke (H) / Dysphagia, unspecified type  Pt severely disabled after stroke in 2017. She is non ambulatory, does occasionally speak in short sentences/phrases per family. Expressive aphasia, dysphagia and cog impairment have been stable. Most recent weight of " 145 which is stable.    Chronic a-fib (H)   On warfarin. INR 3.7 today, follows with coumadin clinic. No s/s of bleeding. Continue CCB, BB. Rate controlled- 60's to 70's.    Partial symptomatic epilepsy with complex partial seizures, not intractable, without status epilepticus (H)  No seizure activity. On keppra. Level minimally elevated at 46 on 4/9 - no clear adverse effects, likely not true tough. No dose change, monitor with routine labs, if continues to trend up will decrease.    Hypertension, unspecified type  Controlled- -120's. No changes in medications made today.     Dementia with anxiety, unspecified dementia severity, unspecified dementia type / Anxiety  Mood/affect very stable per staff. Continue risperdal at current dosage, as taper resulted in significant worsening of anxiety per staff and famil. Currently on 0.125 mg daily only. No clear adverse effect. Family declines any adjustment of this medication.    Dyslipidemia  Continue statin given history of CVA.     Chronic systolic congestive heart failure (H)  Not on diuretics. EF 40% in 2018. Currently on metoprolol. Euvolemic.Monitor.     Routine labs: CBC, BMP, keppra every 6 months    Total time spent with patient visit was 25 min including patient visit, review of pertinent clinical information, and treatment plan.    Jaenth Larkin, CNP

## 2024-05-10 NOTE — PROGRESS NOTES
ANTICOAGULATION MANAGEMENT     Danette Ramos 83 year old female is on warfarin with supratherapeutic INR result. (Goal INR 2.0-3.0)    Recent labs: (last 7 days)     05/10/24  0758   INR 3.7       ASSESSMENT     Source(s): Home care/ facility nurse     Warfarin doses taken: Warfarin taken as instructed  Diet: No new diet changes identified  New illness, injury, or hospitalization: No  Medication/supplement changes: None noted  Signs or symptoms of bleeding or clotting: No  Previous INR: Subtherapeutic  Additional findings: None     PLAN     Recommended plan for no diet, medication or health factor changes affecting INR     Dosing Instructions: decrease your warfarin dose (5.6% change) with next INR in 1 week       Summary  As of 5/10/2024      Full warfarin instructions:  3.75 mg every Mon, Wed, Fri; 2.5 mg all other days   Next INR check:  5/17/2024               Faxed dosing and follow up instructions to [facility]    Orders given to  Homecare nurse/facility to recheck    Education provided: None required    Plan made per [facility] anticoagulation protocol    Felicita Arizmendi, RN  Anticoagulation Clinic  5/10/2024    _______________________________________________________________________     Anticoagulation Episode Summary       Current INR goal:  2.0-3.0   TTR:  51.8% (1 y)   Target end date:  Indefinite   Send INR reminders to:  [facility]    Indications    Anticoagulation monitoring  INR range 2-3 [Z79.01]  Chronic a-fib (H) [I48.20]             Comments:  Lives at [facility] [phone] fax [phone].             Anticoagulation Care Providers       Provider Role Specialty Phone number    Selena Townsend MD Referring Family Medicine [phone]

## 2024-05-17 ENCOUNTER — TRANSFERRED RECORDS (OUTPATIENT)
Dept: HEALTH INFORMATION MANAGEMENT | Facility: OTHER | Age: 84
End: 2024-05-17

## 2024-05-17 ENCOUNTER — ANTICOAGULATION THERAPY VISIT (OUTPATIENT)
Dept: ANTICOAGULATION | Facility: OTHER | Age: 84
End: 2024-05-17
Attending: FAMILY MEDICINE
Payer: MEDICARE

## 2024-05-17 DIAGNOSIS — I48.20 CHRONIC A-FIB (H): ICD-10-CM

## 2024-05-17 DIAGNOSIS — Z79.01 ANTICOAGULATION MONITORING, INR RANGE 2-3: Primary | ICD-10-CM

## 2024-05-17 LAB — INR (EXTERNAL): 3.5 (ref 0.9–1.1)

## 2024-05-17 NOTE — PROGRESS NOTES
ANTICOAGULATION MANAGEMENT     Danette Ramos 83 year old female is on warfarin with supratherapeutic INR result. (Goal INR 2.0-3.0)    Recent labs: (last 7 days)     05/17/24  0802   INR 3.5*       ASSESSMENT     Source(s): Chart Review and Home Care/Facility Nurse     Warfarin doses taken: Warfarin taken as instructed  Diet: No new diet changes identified  Medication/supplement changes: None noted  New illness, injury, or hospitalization: No  Signs or symptoms of bleeding or clotting: No  Previous result: Supratherapeutic  Additional findings: None       PLAN     Recommended plan for no diet, medication or health factor changes affecting INR     Dosing Instructions: Continue your current warfarin dose with next INR in 1 week       Summary  As of 5/17/2024      Full warfarin instructions:  3.75 mg every Mon, Wed, Fri; 2.5 mg all other days   Next INR check:  5/24/2024               Faxed dosing and follow up instructions to [facility]    Orders given to  Homecare nurse/facility to recheck    Patient not here, [employer] Communication sheet with screening questions and current INR received via FAX from outside agency. Results reviewed, Warfarin dosing per protocol, and recommended follow-up appointment made. Paperwork FAXED back to facility.       Plan made per [facility] anticoagulation protocol    Ese Beckett RN  Anticoagulation Clinic  5/17/2024    _______________________________________________________________________     Anticoagulation Episode Summary       Current INR goal:  2.0-3.0   TTR:  50.7% (1 y)   Target end date:  Indefinite   Send INR reminders to:  [facility] [place]    Indications    Anticoagulation monitoring  INR range 2-3 [Z79.01]  Chronic a-fib (H) [I48.20]             Comments:  Lives at [facility] ph [phone] fax [phone].             Anticoagulation Care Providers       Provider Role Specialty Phone number    Selena Townsend MD Referring Family Medicine [phone]

## 2024-05-24 ENCOUNTER — ANTICOAGULATION THERAPY VISIT (OUTPATIENT)
Dept: ANTICOAGULATION | Facility: OTHER | Age: 84
End: 2024-05-24
Attending: FAMILY MEDICINE
Payer: MEDICARE

## 2024-05-24 ENCOUNTER — TRANSFERRED RECORDS (OUTPATIENT)
Dept: HEALTH INFORMATION MANAGEMENT | Facility: OTHER | Age: 84
End: 2024-05-24

## 2024-05-24 DIAGNOSIS — I48.20 CHRONIC A-FIB (H): ICD-10-CM

## 2024-05-24 DIAGNOSIS — Z79.01 ANTICOAGULATION MONITORING, INR RANGE 2-3: Primary | ICD-10-CM

## 2024-05-24 LAB — INR POINT OF CARE: 4.1 (ref 0.9–1.1)

## 2024-05-24 PROCEDURE — 36416 COLLJ CAPILLARY BLOOD SPEC: CPT | Mod: ZL

## 2024-05-24 NOTE — PROGRESS NOTES
ANTICOAGULATION MANAGEMENT     Danette Ramos 83 year old female is on warfarin with supratherapeutic INR result. (Goal INR 2.0-3.0)    Recent labs: (last 7 days)     05/24/24  0638   INR 4.1*       ASSESSMENT     Source(s): Home care/ facility nurse     Warfarin doses taken: Warfarin taken as instructed  Diet: No new diet changes identified  New illness, injury, or hospitalization: No  Medication/supplement changes: None noted  Signs or symptoms of bleeding or clotting: No  Previous INR: Supratherapeutic  Additional findings: None     PLAN     Recommended plan for no diet, medication or health factor changes affecting INR     Dosing Instructions: Continue your current warfarin dose with next INR in 1 week       Summary  As of 5/24/2024      Full warfarin instructions:  5/24: Hold; Otherwise 3.75 mg every Fri; 2.5 mg all other days   Next INR check:  5/31/2024               Faxed dosing and follow up instructions to [facility]    Orders given to  Homecare nurse/facility to recheck    Education provided: Please call back if any changes to your diet, medications or how you've been taking warfarin    Plan made per [facility] anticoagulation protocol    Emma Tobias, RN  Anticoagulation Clinic  5/24/2024    _______________________________________________________________________     Anticoagulation Episode Summary       Current INR goal:  2.0-3.0   TTR:  48.8% (1 y)   Target end date:  Indefinite   Send INR reminders to:  [facility] [place]    Indications    Anticoagulation monitoring  INR range 2-3 [Z79.01]  Chronic a-fib (H) [I48.20]             Comments:  Lives at [facility] [phone] fax [phone].             Anticoagulation Care Providers       Provider Role Specialty Phone number    Selena Townsend MD Referring Family Medicine [phone]

## 2024-05-31 ENCOUNTER — ANTICOAGULATION THERAPY VISIT (OUTPATIENT)
Dept: ANTICOAGULATION | Facility: OTHER | Age: 84
End: 2024-05-31
Attending: FAMILY MEDICINE
Payer: MEDICARE

## 2024-05-31 ENCOUNTER — TRANSFERRED RECORDS (OUTPATIENT)
Dept: HEALTH INFORMATION MANAGEMENT | Facility: OTHER | Age: 84
End: 2024-05-31

## 2024-05-31 DIAGNOSIS — I48.20 CHRONIC A-FIB (H): ICD-10-CM

## 2024-05-31 DIAGNOSIS — Z79.01 ANTICOAGULATION MONITORING, INR RANGE 2-3: Primary | ICD-10-CM

## 2024-05-31 LAB — INR (EXTERNAL): 2.6 (ref 0.9–1.1)

## 2024-05-31 NOTE — PROGRESS NOTES
ANTICOAGULATION MANAGEMENT     Danette Ramos 83 year old female is on warfarin with therapeutic INR result. (Goal INR 2.0-3.0)    Recent labs: (last 7 days)     05/31/24  0815   INR 2.6*       ASSESSMENT     Source(s): Chart Review and Home Care/Facility Nurse     Warfarin doses taken: Warfarin taken as instructed  Diet: No new diet changes identified  Medication/supplement changes: None noted  New illness, injury, or hospitalization: No  Signs or symptoms of bleeding or clotting: No  Previous result: Supratherapeutic  Additional findings: None       PLAN     Recommended plan for no diet, medication or health factor changes affecting INR     Dosing Instructions: Continue your current warfarin dose with next INR in 1 week       Summary  As of 5/31/2024      Full warfarin instructions:  3.75 mg every Fri; 2.5 mg all other days   Next INR check:  6/7/2024               Faxed dosing and follow up instructions to [facility]    Orders given to  Homecare nurse/facility to recheck    Patient not here, [employer] Communication sheet with screening questions and current INR received via FAX from outside agency. Results reviewed, Warfarin dosing per protocol, and recommended follow-up appointment made. Paperwork FAXED back to facility.       Plan made per [facility] anticoagulation protocol    Ese Beckett RN  Anticoagulation Clinic  5/31/2024    _______________________________________________________________________     Anticoagulation Episode Summary       Current INR goal:  2.0-3.0   TTR:  47.4% (1 y)   Target end date:  Indefinite   Send INR reminders to:  [facility] [place]    Indications    Anticoagulation monitoring  INR range 2-3 [Z79.01]  Chronic a-fib (H) [I48.20]             Comments:  Lives at [facility] [phone] fax [phone].             Anticoagulation Care Providers       Provider Role Specialty Phone number    Selena Townsend MD Referring Family Medicine [phone]               Constitutional: No fever, chills.  Eyes:  No visual changes  ENMT:  No neck pain  Cardiac:  No chest pain  Respiratory:  No cough, SOB  GI:  No nausea, vomiting, diarrhea, abdominal pain.  :  No dysuria, hematuria  MS:  No back pain.  Neuro:  No headache or lightheadedness  Skin:  +skin abscess   Except as documented in the HPI,  all other systems are negative.

## 2024-06-07 ENCOUNTER — ANTICOAGULATION THERAPY VISIT (OUTPATIENT)
Dept: ANTICOAGULATION | Facility: OTHER | Age: 84
End: 2024-06-07
Attending: FAMILY MEDICINE
Payer: MEDICARE

## 2024-06-07 ENCOUNTER — TRANSFERRED RECORDS (OUTPATIENT)
Dept: HEALTH INFORMATION MANAGEMENT | Facility: OTHER | Age: 84
End: 2024-06-07

## 2024-06-07 DIAGNOSIS — I48.20 CHRONIC A-FIB (H): ICD-10-CM

## 2024-06-07 DIAGNOSIS — Z79.01 ANTICOAGULATION MONITORING, INR RANGE 2-3: Primary | ICD-10-CM

## 2024-06-07 LAB — INR (EXTERNAL): 3.1

## 2024-06-07 NOTE — PROGRESS NOTES
ANTICOAGULATION MANAGEMENT     Danette Ramos 83 year old female is on warfarin with supratherapeutic INR result. (Goal INR 2.0-3.0)    Recent labs: (last 7 days)     06/07/24  0742   INR 3.1       ASSESSMENT     Source(s): Home care/ facility nurse and chart review     Warfarin doses taken: Warfarin taken as instructed  Diet: No new diet changes identified  New illness, injury, or hospitalization: No  Medication/supplement changes: None noted  Signs or symptoms of bleeding or clotting: No  Previous INR: Therapeutic last visit; previously outside of goal range  Additional findings: None     PLAN     Recommended plan for no diet, medication or health factor changes affecting INR     Dosing Instructions: decrease your warfarin dose (6.7% change) with next INR in 1 week       Summary  As of 6/7/2024      Full warfarin instructions:  2.5 mg every day   Next INR check:  6/14/2024               Faxed dosing and follow up instructions to [facility]    Orders given to  Homecare nurse/facility to recheck    Education provided: None required    Plan made per [facility] anticoagulation protocol    Felicita Arizmendi, RN  Anticoagulation Clinic  6/7/2024    _______________________________________________________________________     Anticoagulation Episode Summary       Current INR goal:  2.0-3.0   TTR:  47.0% (1 y)   Target end date:  Indefinite   Send INR reminders to:  [facility] [place]    Indications    Anticoagulation monitoring  INR range 2-3 [Z79.01]  Chronic a-fib (H) [I48.20]             Comments:  Lives at [facility] [phone] fax [phone].             Anticoagulation Care Providers       Provider Role Specialty Phone number    Selena Townsend MD Referring Family Medicine [phone]

## 2024-06-13 VITALS
SYSTOLIC BLOOD PRESSURE: 99 MMHG | DIASTOLIC BLOOD PRESSURE: 65 MMHG | OXYGEN SATURATION: 97 % | BODY MASS INDEX: 27.3 KG/M2 | HEART RATE: 62 BPM | HEIGHT: 63 IN | TEMPERATURE: 97.3 F | RESPIRATION RATE: 17 BRPM | WEIGHT: 154.1 LBS

## 2024-06-14 ENCOUNTER — ANTICOAGULATION THERAPY VISIT (OUTPATIENT)
Dept: ANTICOAGULATION | Facility: OTHER | Age: 84
End: 2024-06-14
Payer: MEDICARE

## 2024-06-14 ENCOUNTER — TRANSFERRED RECORDS (OUTPATIENT)
Dept: HEALTH INFORMATION MANAGEMENT | Facility: OTHER | Age: 84
End: 2024-06-14

## 2024-06-14 ENCOUNTER — NURSING HOME VISIT (OUTPATIENT)
Dept: GERIATRICS | Facility: OTHER | Age: 84
End: 2024-06-14
Payer: MEDICARE

## 2024-06-14 DIAGNOSIS — Z78.9 LIVING IN NURSING HOME: Primary | ICD-10-CM

## 2024-06-14 DIAGNOSIS — Z79.01 ANTICOAGULATION MONITORING, INR RANGE 2-3: Primary | ICD-10-CM

## 2024-06-14 DIAGNOSIS — I48.20 CHRONIC A-FIB (H): ICD-10-CM

## 2024-06-14 DIAGNOSIS — I69.359 HEMIPARESIS AND SPEECH AND LANGUAGE DEFICIT AS LATE EFFECTS OF STROKE (H): ICD-10-CM

## 2024-06-14 DIAGNOSIS — I69.328 HEMIPARESIS AND SPEECH AND LANGUAGE DEFICIT AS LATE EFFECTS OF STROKE (H): ICD-10-CM

## 2024-06-14 LAB — INR (EXTERNAL): 2.9 (ref 0.9–1.1)

## 2024-06-14 PROCEDURE — 99309 SBSQ NF CARE MODERATE MDM 30: CPT

## 2024-06-14 NOTE — PROGRESS NOTES
HISTORY OF PRESENT ILLNESS:      Danette is a 83 year old female (1940)  resident of [facility] who is being seen today for a routine LTC visit.     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     Discussed with nursing staff who have the following concerns: None.     Patient is seen in their room. Family is not present. She is sleeping, and only opens eyes slightly to voice. She does not answer my questions.    Current medications, allergies, and interdisciplinary care plan are reviewed.    Patient Active Problem List    Diagnosis Date Noted    Anticoagulation monitoring, INR range 2-3 04/07/2023     Priority: Medium    Hospice care patient 01/07/2018     Priority: Medium    DNR (do not resuscitate) 04/03/2017     Priority: Medium    Physical deconditioning 04/03/2017     Priority: Medium    Delirium 03/03/2017     Priority: Medium    Chest pain 03/03/2017     Priority: Medium    Right leg pain 03/03/2017     Priority: Medium    Generalized weakness 11/01/2015     Priority: Medium    Hemiparesis and speech and language deficit as late effects of stroke (H) 11/01/2015     Priority: Medium    Orthostatic hypotension 05/18/2015     Priority: Medium    Anxiety 05/14/2015     Priority: Medium    Embolic stroke involving posterior cerebral artery (H) 05/11/2015     Priority: Medium    Complex partial seizure (H) 05/11/2015     Priority: Medium    TIA (transient ischemic attack) 05/10/2015     Priority: Medium    Dyslipidemia 11/03/2014     Priority: Medium    Hemiparesis, aphasia, and dysphagia as late effects of stroke (H) 11/03/2014     Priority: Medium    Hypertension 11/03/2014     Priority: Medium    Aphasia 09/16/2014     Priority: Medium    Apraxia 09/16/2014     Priority: Medium    Chronic a-fib (H) 09/16/2014     Priority: Medium    Dysphagia 09/16/2014     Priority: Medium    Hemineglect 09/16/2014     Priority: Medium    Orthostatic  tremor 09/16/2014     Priority: Medium    Right hemiparesis (H) 09/16/2014     Priority: Medium    Hypokalemia 09/09/2014     Priority: Medium    Health care directive on file 10/31/2008     Priority: Medium          Social History     Socioeconomic History    Marital status:      Spouse name: Not on file    Number of children: Not on file    Years of education: Not on file    Highest education level: Not on file   Occupational History    Not on file   Tobacco Use    Smoking status: Unknown    Smokeless tobacco: Not on file   Substance and Sexual Activity    Alcohol use: Not Currently    Drug use: Never    Sexual activity: Not Currently   Other Topics Concern    Not on file   Social History Narrative    Not on file     Social Determinants of Health     Financial Resource Strain: Not on file   Food Insecurity: Not on file   Transportation Needs: Not on file   Physical Activity: Not on file   Stress: Not on file   Social Connections: Not on file   Interpersonal Safety: Not on file   Housing Stability: Not on file        Current Outpatient Medications   Medication Sig Dispense Refill    Acetaminophen (TYLENOL PO) Take 1,000 mg by mouth 2 times daily      ACETAMINOPHEN PO Take 650 mg by mouth every 6 hours as needed for pain      Atorvastatin Calcium (LIPITOR PO) Take 20 mg by mouth daily      bisacodyl (DULCOLAX) 10 MG Suppository Place 10 mg rectally PRN every 4 days      bismuth subsalicylate (PEPTO BISMOL) 262 MG/15ML suspension Take 30 mLs by mouth every 4 hours as needed for indigestion      diltiazem (CARDIZEM) 120 MG tablet Take 120 mg by mouth daily      ketoconazole (NIZORAL) 2 % external shampoo Twice weekly      levETIRAcetam (KEPPRA) 750 MG tablet Take 750 mg by mouth 2 times daily      magnesium hydroxide (MILK OF MAGNESIA) 400 MG/5ML suspension Take 30 mLs by mouth daily as needed PRN      metoprolol tartrate (LOPRESSOR) 25 MG tablet Take 1 tablet (25 mg) by mouth 2 times daily (Patient taking  "differently: Take 25 mg by mouth daily) 60 tablet 0    nystatin (MYCOSTATIN) 370060 UNIT/GM external powder Apply topically 2 times daily prn      risperiDONE (RISPERDAL) 0.5 MG tablet Take 0.125 mg by mouth daily      senna-docusate (SENOKOT-S;PERICOLACE) 8.6-50 MG per tablet Take 2 tablets by mouth daily      SERTRALINE HCL PO Take 50 mg by mouth daily      Vitamin D, Cholecalciferol, 1000 units CAPS Take 2,000 Units by mouth daily (Patient taking differently: Take 1,000 Units by mouth daily) 30 capsule 0    Warfarin Sodium (COUMADIN PO) Take 5 mg by mouth Mon, Fri      Warfarin Sodium (COUMADIN PO) Take 2.5 mg by mouth daily Tue, wed, thu, sat, sun       No current facility-administered medications for this visit.       Allergies   Allergen Reactions    Bacitracin Rash     Contact dermatitis       I have reviewed the care plan and do agree with the plan.    ROS:  Unable to obtain due to pt factors.     OBJECTIVE:  BP 99/65   Pulse 62   Temp 97.3  F (36.3  C)   Resp 17   Ht 1.6 m (5' 3\")   Wt 69.9 kg (154 lb 1.6 oz)   SpO2 97%   BMI 27.30 kg/m      GENERAL: Alert. In no acute distress  RESP:  Lungs clear.  No rales, rhonchi, or wheezing  CV:  RRR.  S1 S2 with no murmur. No clicks or rubs.  ABD: Soft, non tender, non distended, no organomegaly. BS are normal.   SKIN:  Age-related changes.  No suspicious lesions or rashes. No ulcerations to feet.   PSYCH:  Affect is flat.  NEURO: Alerts to voice. Does not follow commands.  EXTREM:  No edema.    Lab/Diagnostic data:    Reviewed in Epic    ASSESSMENT/ORDERS:    Diagnoses and all orders for this visit:    Cerebrovascular accident (CVA) due to embolism of posterior cerebral artery, unspecified blood vessel laterality (H) . Hemiparesis, aphasia, and dysphagia as late effects of stroke (H) / Dysphagia, unspecified type  Pt severely disabled after stroke in 2017. She is non ambulatory, does occasionally speak in short sentences/phrases per family. Expressive " aphasia, dysphagia and cog impairment have been stable. Weight variable, 142-145, stable.     Chronic a-fib (H)   On warfarin. INR 2.9 today, follows with coumadin clinic. No s/s of bleeding. Continue CCB, BB. Rate controlled- 60's to 70's.    Partial symptomatic epilepsy with complex partial seizures, not intractable, without status epilepticus (H)  No seizure activity. On keppra. Level minimally elevated at 46 on 4/9 - no clear adverse effects, likely not true tough. No dose change, monitor with routine labs, if continues to trend up will plan to decrease.    Hypertension, unspecified type  Controlled. One low readings otherwise -120. Monitor. No changes in medications made today.     Dementia with anxiety, unspecified dementia severity, unspecified dementia type / Anxiety  Mood/affect very stable per staff. Continue risperdal at current dosage, as taper resulted in significant worsening of anxiety per staff and famil. Currently on 0.125 mg daily only. No clear adverse effect. Family declines any adjustment of this medication.    Dyslipidemia  Continue statin given history of CVA.     Chronic systolic congestive heart failure (H)  Not on diuretics. EF 40% in 2018. Currently on metoprolol. Euvolemic.Monitor.     Routine labs: CBC, BMP, keppra every 6 months    Total time spent with patient visit was 30 min including patient visit, review of pertinent clinical information, and treatment plan.    Janeth Larkin, CNP

## 2024-06-14 NOTE — PROGRESS NOTES
ANTICOAGULATION MANAGEMENT     Danette Ramos 83 year old female is on warfarin with therapeutic INR result. (Goal INR 2.0-3.0)    Recent labs: (last 7 days)     06/14/24  0821   INR 2.9*       ASSESSMENT     Source(s): Chart Review and Home Care/Facility Nurse     Warfarin doses taken: Warfarin taken as instructed  Diet: No new diet changes identified  Medication/supplement changes: None noted  New illness, injury, or hospitalization: No  Signs or symptoms of bleeding or clotting: No  Previous result: Supratherapeutic  Additional findings: None       PLAN     Recommended plan for no diet, medication or health factor changes affecting INR     Dosing Instructions: Continue your current warfarin dose with next INR in 2 weeks       Summary  As of 6/14/2024      Full warfarin instructions:  2.5 mg every day   Next INR check:  6/28/2024               Faxed dosing and follow up instructions to [facility]    Orders given to  Homecare nurse/facility to recheck    Patient not here, [employer] Communication sheet with screening questions and current INR received via FAX from outside agency. Results reviewed, Warfarin dosing per protocol, and recommended follow-up appointment made. Paperwork FAXED back to facility.       Plan made per [facility] anticoagulation protocol    Ese Beckett RN  Anticoagulation Clinic  6/14/2024    _______________________________________________________________________     Anticoagulation Episode Summary       Current INR goal:  2.0-3.0   TTR:  46.0% (1 y)   Target end date:  Indefinite   Send INR reminders to:  [facility] [place]    Indications    Anticoagulation monitoring  INR range 2-3 [Z79.01]  Chronic a-fib (H) [I48.20]             Comments:  Lives at [facility] ph [phone] fax [phone].             Anticoagulation Care Providers       Provider Role Specialty Phone number    Selena Townsend MD Referring Family Medicine [phone]

## 2024-06-28 ENCOUNTER — ANTICOAGULATION THERAPY VISIT (OUTPATIENT)
Dept: ANTICOAGULATION | Facility: OTHER | Age: 84
End: 2024-06-28
Payer: MEDICARE

## 2024-06-28 ENCOUNTER — TRANSFERRED RECORDS (OUTPATIENT)
Dept: HEALTH INFORMATION MANAGEMENT | Facility: OTHER | Age: 84
End: 2024-06-28

## 2024-06-28 DIAGNOSIS — Z79.01 ANTICOAGULATION MONITORING, INR RANGE 2-3: Primary | ICD-10-CM

## 2024-06-28 DIAGNOSIS — I48.20 CHRONIC A-FIB (H): ICD-10-CM

## 2024-06-28 LAB — INR (EXTERNAL): 2 (ref 0.9–1.1)

## 2024-06-28 NOTE — PROGRESS NOTES
ANTICOAGULATION MANAGEMENT     Danette Ramos 83 year old female is on warfarin with therapeutic INR result. (Goal INR 2.0-3.0)    Recent labs: (last 7 days)     06/28/24  0826   INR 2.0*       ASSESSMENT     Source(s): Chart Review and Home Care/Facility Nurse     Warfarin doses taken: Warfarin taken as instructed  Diet: No new diet changes identified  Medication/supplement changes: None noted  New illness, injury, or hospitalization: No  Signs or symptoms of bleeding or clotting: No  Previous result: Therapeutic last visit; previously outside of goal range  Additional findings: None       PLAN     Recommended plan for no diet, medication or health factor changes affecting INR     Dosing Instructions: Continue your current warfarin dose with next INR in 2 weeks       Summary  As of 6/28/2024      Full warfarin instructions:  2.5 mg every day   Next INR check:  7/12/2024               Faxed dosing and follow up instructions to [facility]    Orders given to  Homecare nurse/facility to recheck    Patient not here, Protime Communication sheet with screening questions and current INR received via FAX from outside agency. Results reviewed, Warfarin dosing per protocol, and recommended follow-up appointment made. Paperwork FAXED back to facility.       Plan made per [facility] anticoagulation protocol    Ese Beckett RN  Anticoagulation Clinic  6/28/2024    _______________________________________________________________________     Anticoagulation Episode Summary       Current INR goal:  2.0-3.0   TTR:  46.0% (1 y)   Target end date:  Indefinite   Send INR reminders to:  [facility] [place]    Indications    Anticoagulation monitoring  INR range 2-3 [Z79.01]  Chronic a-fib (H) [I48.20]             Comments:  Lives at [facility] ph [phone] fax [phone].             Anticoagulation Care Providers       Provider Role Specialty Phone number    Selena Townsend MD Referring Family Medicine [phone]

## 2024-07-09 VITALS
HEART RATE: 78 BPM | DIASTOLIC BLOOD PRESSURE: 62 MMHG | WEIGHT: 142.2 LBS | RESPIRATION RATE: 17 BRPM | SYSTOLIC BLOOD PRESSURE: 124 MMHG | TEMPERATURE: 97.7 F | HEIGHT: 63 IN | OXYGEN SATURATION: 94 % | BODY MASS INDEX: 25.2 KG/M2

## 2024-07-11 ENCOUNTER — NURSING HOME VISIT (OUTPATIENT)
Dept: GERIATRICS | Facility: OTHER | Age: 84
End: 2024-07-11
Attending: FAMILY MEDICINE
Payer: MEDICARE

## 2024-07-11 DIAGNOSIS — Z78.9 NURSING HOME RESIDENT: Primary | ICD-10-CM

## 2024-07-11 PROCEDURE — 99308 SBSQ NF CARE LOW MDM 20: CPT | Performed by: FAMILY MEDICINE

## 2024-07-12 ENCOUNTER — TRANSFERRED RECORDS (OUTPATIENT)
Dept: HEALTH INFORMATION MANAGEMENT | Facility: OTHER | Age: 84
End: 2024-07-12

## 2024-07-12 ENCOUNTER — ANTICOAGULATION THERAPY VISIT (OUTPATIENT)
Dept: ANTICOAGULATION | Facility: OTHER | Age: 84
End: 2024-07-12
Attending: FAMILY MEDICINE
Payer: MEDICARE

## 2024-07-12 DIAGNOSIS — Z79.01 ANTICOAGULATION MONITORING, INR RANGE 2-3: Primary | ICD-10-CM

## 2024-07-12 DIAGNOSIS — I48.20 CHRONIC A-FIB (H): ICD-10-CM

## 2024-07-12 LAB — INR (EXTERNAL): 1.8 (ref 0.9–1.1)

## 2024-07-12 NOTE — PROGRESS NOTES
ANTICOAGULATION MANAGEMENT     Danette Ramos 83 year old female is on warfarin with subtherapeutic INR result. (Goal INR 2.0-3.0)    Recent labs: (last 7 days)     07/12/24  0803   INR 1.8*       ASSESSMENT     Source(s): Chart Review and Home Care/Facility Nurse     Warfarin doses taken: Warfarin taken as instructed  Diet: No new diet changes identified  Medication/supplement changes: None noted  New illness, injury, or hospitalization: No  Signs or symptoms of bleeding or clotting: No  Previous result: Therapeutic last 2(+) visits  Additional findings: None       PLAN     Recommended plan for no diet, medication or health factor changes affecting INR     Dosing Instructions: Increase your warfarin dose (7.1% change) with next INR in 2 weeks       Summary  As of 7/12/2024      Full warfarin instructions:  3.75 mg every Fri; 2.5 mg all other days   Next INR check:  7/26/2024               Faxed dosing and follow up instructions to [facility]    Orders given to  Homecare nurse/facility to recheck    Patient not here, [employer] Communicationsheet with screening questions and current INR received via FAX from outside agency. Results reviewed, Warfarin dosing per protocol, and recommended follow-up appointment made. Paperwork FAXED back to facility.       Plan made per [facility] anticoagulation protocol    Ese Beckett RN  Anticoagulation Clinic  7/12/2024    _______________________________________________________________________     Anticoagulation Episode Summary       Current INR goal:  2.0-3.0   TTR:  42.2% (1 y)   Target end date:  Indefinite   Send INR reminders to:  [facility] [place]    Indications    Anticoagulation monitoring  INR range 2-3 [Z79.01]  Chronic a-fib (H) [I48.20]             Comments:  Lives at [facility] [phone] fax [phone].             Anticoagulation Care Providers       Provider Role Specialty Phone number    Selena Townsend MD Referring Family Medicine [phone]

## 2024-07-15 NOTE — PROGRESS NOTES
HISTORY OF PRESENT ILLNESS:      Danette is a 83 year old female (1940)  resident of [facility] who is being seen today for a routine LTC visit.     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     Discussed with nursing staff who have the following concerns: None.     Patient is seen in their room. Physical therapist also present. Family is not present. Pt is tolerating ROM exercises well, appears very comfortable. She arouses to voice, answers questions, but cannot make out her speech. She is smiling.     Current medications, allergies, and interdisciplinary care plan are reviewed.    Patient Active Problem List    Diagnosis Date Noted    Anticoagulation monitoring, INR range 2-3 04/07/2023     Priority: Medium    Hospice care patient 01/07/2018     Priority: Medium    DNR (do not resuscitate) 04/03/2017     Priority: Medium    Physical deconditioning 04/03/2017     Priority: Medium    Delirium 03/03/2017     Priority: Medium    Chest pain 03/03/2017     Priority: Medium    Right leg pain 03/03/2017     Priority: Medium    Generalized weakness 11/01/2015     Priority: Medium    Hemiparesis and speech and language deficit as late effects of stroke (H) 11/01/2015     Priority: Medium    Orthostatic hypotension 05/18/2015     Priority: Medium    Anxiety 05/14/2015     Priority: Medium    Embolic stroke involving posterior cerebral artery (H) 05/11/2015     Priority: Medium    Complex partial seizure (H) 05/11/2015     Priority: Medium    TIA (transient ischemic attack) 05/10/2015     Priority: Medium    Dyslipidemia 11/03/2014     Priority: Medium    Hemiparesis, aphasia, and dysphagia as late effects of stroke (H) 11/03/2014     Priority: Medium    Hypertension 11/03/2014     Priority: Medium    Aphasia 09/16/2014     Priority: Medium    Apraxia 09/16/2014     Priority: Medium    Chronic a-fib (H) 09/16/2014     Priority: Medium    Dysphagia  09/16/2014     Priority: Medium    Hemineglect 09/16/2014     Priority: Medium    Orthostatic tremor 09/16/2014     Priority: Medium    Right hemiparesis (H) 09/16/2014     Priority: Medium    Hypokalemia 09/09/2014     Priority: Medium    Health care directive on file 10/31/2008     Priority: Medium          Social History     Socioeconomic History    Marital status:      Spouse name: Not on file    Number of children: Not on file    Years of education: Not on file    Highest education level: Not on file   Occupational History    Not on file   Tobacco Use    Smoking status: Unknown    Smokeless tobacco: Not on file   Substance and Sexual Activity    Alcohol use: Not Currently    Drug use: Never    Sexual activity: Not Currently   Other Topics Concern    Not on file   Social History Narrative    Not on file     Social Determinants of Health     Financial Resource Strain: Not on file   Food Insecurity: Not on file   Transportation Needs: Not on file   Physical Activity: Not on file   Stress: Not on file   Social Connections: Not on file   Interpersonal Safety: Not on file   Housing Stability: Not on file        Current Outpatient Medications   Medication Sig Dispense Refill    Acetaminophen (TYLENOL PO) Take 1,000 mg by mouth 2 times daily      ACETAMINOPHEN PO Take 650 mg by mouth every 6 hours as needed for pain      Atorvastatin Calcium (LIPITOR PO) Take 20 mg by mouth daily      bisacodyl (DULCOLAX) 10 MG Suppository Place 10 mg rectally PRN every 4 days      bismuth subsalicylate (PEPTO BISMOL) 262 MG/15ML suspension Take 30 mLs by mouth every 4 hours as needed for indigestion      diltiazem (CARDIZEM) 120 MG tablet Take 120 mg by mouth daily      ketoconazole (NIZORAL) 2 % external shampoo Twice weekly      levETIRAcetam (KEPPRA) 750 MG tablet Take 750 mg by mouth 2 times daily      magnesium hydroxide (MILK OF MAGNESIA) 400 MG/5ML suspension Take 30 mLs by mouth daily as needed PRN      metoprolol  "tartrate (LOPRESSOR) 25 MG tablet Take 1 tablet (25 mg) by mouth 2 times daily (Patient taking differently: Take 25 mg by mouth daily) 60 tablet 0    nystatin (MYCOSTATIN) 627727 UNIT/GM external powder Apply topically 2 times daily prn      risperiDONE (RISPERDAL) 0.5 MG tablet Take 0.125 mg by mouth daily      senna-docusate (SENOKOT-S;PERICOLACE) 8.6-50 MG per tablet Take 2 tablets by mouth daily      SERTRALINE HCL PO Take 50 mg by mouth daily      Vitamin D, Cholecalciferol, 1000 units CAPS Take 2,000 Units by mouth daily (Patient taking differently: Take 1,000 Units by mouth daily) 30 capsule 0    Warfarin Sodium (COUMADIN PO) Take 2.5 mg by mouth daily       No current facility-administered medications for this visit.       Allergies   Allergen Reactions    Bacitracin Rash     Contact dermatitis       I have reviewed the care plan and do agree with the plan.    ROS:  Unable to obtain due to pt factors.     OBJECTIVE:  /62   Pulse 78   Temp 97.7  F (36.5  C)   Resp 17   Ht 1.6 m (5' 3\")   Wt 64.5 kg (142 lb 3.2 oz)   SpO2 94%   BMI 25.19 kg/m      GENERAL: Alert. In no acute distress  RESP:  Lungs clear.  No rales, rhonchi, or wheezing  CV:  RRR.  S1 S2 with no murmur. No clicks or rubs.  ABD: Soft, non tender, non distended, no organomegaly. BS are normal. rashes. No ulcerations to feet.   PSYCH:  Affect is smiling  NEURO: Alerts to voice. Does not follow commands.  EXTREM:  No edema.    Lab/Diagnostic data:    Reviewed in Epic    ASSESSMENT/ORDERS:    Diagnoses and all orders for this visit:    Cerebrovascular accident (CVA) due to embolism of posterior cerebral artery, unspecified blood vessel laterality (H) . Hemiparesis, aphasia, and dysphagia as late effects of stroke (H) / Dysphagia, unspecified type  Pt severely disabled after stroke in 2017. She is non ambulatory, does occasionally speak in short sentences/phrases per family. Expressive aphasia, dysphagia and cog impairment have been " stable. Weights reviewed, stable    Chronic a-fib (H)   On warfarin. INR 2.9 today, follows with coumadin clinic. No s/s of bleeding. Continue CCB, BB. Rate controlled- 60's to 70's. Continue current therapy. Consider doac, however, has been doing well on current therapy    Partial symptomatic epilepsy with complex partial seizures, not intractable, without status epilepticus (H)  No seizure activity. On keppra. Level minimally elevated at 46 on 4/9/24 - no clear adverse effects, likely not true tough. Agree with plan to decrease dose, if trending or new sx of toxicity    Hypertension, unspecified type  Controlled, continue current regimen    Dementia with anxiety, unspecified dementia severity, unspecified dementia type / Anxiety  Mood/affect very stable per staff. Continue risperdal at current dosage, as taper resulted in significant worsening of anxiety per staff and family. Currently on 0.125 mg daily only. No clear adverse effect.    Dyslipidemia  Continue statin given history of CVA.     Chronic systolic congestive heart failure (H)  Not on diuretics. EF 40% in 2018. Currently on metoprolol. Euvolemic.Monitor.     Routine labs: CBC, BMP, keppra every 6 months    Total time spent with patient visit was 25 min including patient visit, review of pertinent clinical information, and treatment plan.    Selena Townsend MD

## 2024-07-26 ENCOUNTER — TRANSFERRED RECORDS (OUTPATIENT)
Dept: HEALTH INFORMATION MANAGEMENT | Facility: OTHER | Age: 84
End: 2024-07-26

## 2024-07-26 ENCOUNTER — ANTICOAGULATION THERAPY VISIT (OUTPATIENT)
Dept: ANTICOAGULATION | Facility: OTHER | Age: 84
End: 2024-07-26
Attending: FAMILY MEDICINE
Payer: MEDICARE

## 2024-07-26 DIAGNOSIS — I48.20 CHRONIC A-FIB (H): ICD-10-CM

## 2024-07-26 DIAGNOSIS — Z79.01 ANTICOAGULATION MONITORING, INR RANGE 2-3: Primary | ICD-10-CM

## 2024-07-26 LAB — INR (EXTERNAL): 1.5 (ref 0.9–1.1)

## 2024-07-26 NOTE — PROGRESS NOTES
ANTICOAGULATION MANAGEMENT     Danette Ramos 83 year old female is on warfarin with subtherapeutic INR result. (Goal INR 2.0-3.0)    Recent labs: (last 7 days)     07/26/24  0804   INR 1.5*       ASSESSMENT     Source(s): Chart Review and Home Care/Facility Nurse     Warfarin doses taken: Warfarin taken as instructed  Diet: No new diet changes identified  Medication/supplement changes: None noted  New illness, injury, or hospitalization: No  Signs or symptoms of bleeding or clotting: No  Previous result: Subtherapeutic  Additional findings: None       PLAN     Recommended plan for no diet, medication or health factor changes affecting INR     Dosing Instructions: Increase your warfarin dose (13.3% change) with next INR in 2 weeks       Summary  As of 7/26/2024      Full warfarin instructions:  3.75 mg every Mon, Wed, Fri; 2.5 mg all other days   Next INR check:  8/9/2024               Faxed dosing and follow up instructions to [facility]    Orders given to  Homecare nurse/facility to recheck    Education provided: None required    Plan made per [facility] anticoagulation protocol    Ese Beckett, RN  Anticoagulation Clinic  7/26/2024    _______________________________________________________________________     Anticoagulation Episode Summary       Current INR goal:  2.0-3.0   TTR:  38.3% (1 y)   Target end date:  Indefinite   Send INR reminders to:  [facility] [place]    Indications    Anticoagulation monitoring  INR range 2-3 [Z79.01]  Chronic a-fib (H) [I48.20]             Comments:  Lives at [facility] [phone] fax [phone].             Anticoagulation Care Providers       Provider Role Specialty Phone number    Selena Townsend MD Referring Family Medicine [phone]

## 2024-08-02 ENCOUNTER — APPOINTMENT (OUTPATIENT)
Dept: ANTICOAGULATION | Facility: OTHER | Age: 84
End: 2024-08-02
Attending: FAMILY MEDICINE
Payer: MEDICARE

## 2024-08-09 ENCOUNTER — ANTICOAGULATION THERAPY VISIT (OUTPATIENT)
Dept: ANTICOAGULATION | Facility: OTHER | Age: 84
End: 2024-08-09
Attending: FAMILY MEDICINE
Payer: MEDICARE

## 2024-08-09 DIAGNOSIS — Z79.01 ANTICOAGULATION MONITORING, INR RANGE 2-3: Primary | ICD-10-CM

## 2024-08-09 DIAGNOSIS — I48.20 CHRONIC A-FIB (H): ICD-10-CM

## 2024-08-09 LAB — INR (EXTERNAL): 2.4

## 2024-08-09 NOTE — PROGRESS NOTES
ANTICOAGULATION MANAGEMENT     Danette Ramos 83 year old female is on warfarin with therapeutic INR result. (Goal INR 2.0-3.0)    Recent labs: (last 7 days)     08/09/24  0611   INR 2.4       ASSESSMENT     Source(s): Chart review     Warfarin doses taken: Warfarin taken as instructed  Diet: No new diet changes identified  New illness, injury, or hospitalization: No  Medication/supplement changes: None noted  Signs or symptoms of bleeding or clotting: No  Previous INR: Therapeutic last visit; previously outside of goal range  Additional findings: None     PLAN     Recommended plan for no diet, medication or health factor changes affecting INR     Dosing Instructions: Continue your current warfarin dose with next INR in 2 weeks       Summary  As of 8/9/2024      Full warfarin instructions:  3.75 mg every Mon, Wed, Fri; 2.5 mg all other days   Next INR check:  8/23/2024               Faxed dosing and follow up instructions to [facility]    Orders given to  Homecare nurse/facility to recheck on 8/23/24    Education provided: None required    Plan made per [facility] anticoagulation protocol    Courtney Del Rosario RN  Anticoagulation Clinic  8/9/2024    _______________________________________________________________________     Anticoagulation Episode Summary       Current INR goal:  2.0-3.0   TTR:  36.2% (1 y)   Target end date:  Indefinite   Send INR reminders to:  [facility] [place]    Indications    Anticoagulation monitoring  INR range 2-3 [Z79.01]  Chronic a-fib (H) [I48.20]             Comments:  Lives at [facility] [phone] fax [phone].             Anticoagulation Care Providers       Provider Role Specialty Phone number    Selena Townsend MD Referring Family Medicine [phone]

## 2024-08-23 ENCOUNTER — TRANSFERRED RECORDS (OUTPATIENT)
Dept: HEALTH INFORMATION MANAGEMENT | Facility: OTHER | Age: 84
End: 2024-08-23

## 2024-08-23 ENCOUNTER — ANTICOAGULATION THERAPY VISIT (OUTPATIENT)
Dept: ANTICOAGULATION | Facility: OTHER | Age: 84
End: 2024-08-23
Attending: FAMILY MEDICINE
Payer: MEDICARE

## 2024-08-23 DIAGNOSIS — I48.20 CHRONIC A-FIB (H): ICD-10-CM

## 2024-08-23 DIAGNOSIS — Z79.01 ANTICOAGULATION MONITORING, INR RANGE 2-3: Primary | ICD-10-CM

## 2024-08-23 LAB — INR (EXTERNAL): 3.2 (ref 0.9–1.1)

## 2024-08-23 NOTE — PROGRESS NOTES
ANTICOAGULATION MANAGEMENT     Danette Ramos 83 year old female is on warfarin with supratherapeutic INR result. (Goal INR 2.0-3.0)    Recent labs: (last 7 days)     08/23/24  0826   INR 3.2*       ASSESSMENT     Source(s): Chart Review and Home Care/Facility Nurse     Warfarin doses taken: Warfarin taken as instructed  Diet: No new diet changes identified  Medication/supplement changes: None noted  New illness, injury, or hospitalization: No  Signs or symptoms of bleeding or clotting: No  Previous result: Therapeutic last visit; previously outside of goal range  Additional findings: None       PLAN     Recommended plan for no diet, medication or health factor changes affecting INR     Dosing Instructions: decrease your warfarin dose (5.9% change) with next INR in 2 weeks       Summary  As of 8/23/2024      Full warfarin instructions:  3.75 mg every Mon, Fri; 2.5 mg all other days   Next INR check:  9/6/2024               Faxed dosing and follow up instructions to [facility]    Orders given to  Homecare nurse/facility to recheck    Patient not here, Protime Communication sheet with screening questions and current INR received via FAX from outside agency. Results reviewed, Warfarin dosing per protocol, and recommended follow-up appointment made. Paperwork FAXED back to facility.       Plan made per [facility] anticoagulation protocol    Ese Beckett, RN  Anticoagulation Clinic  8/23/2024    _______________________________________________________________________     Anticoagulation Episode Summary       Current INR goal:  2.0-3.0   TTR:  35.3% (1 y)   Target end date:  Indefinite   Send INR reminders to:  [facility] [place]    Indications    Anticoagulation monitoring  INR range 2-3 [Z79.01]  Chronic a-fib (H) [I48.20]             Comments:  Lives at [facility] [phone] fax [phone].             Anticoagulation Care Providers       Provider Role Specialty Phone number    Selena Townsend MD  [facility] Family [facility] [phone]

## 2024-09-06 ENCOUNTER — TRANSFERRED RECORDS (OUTPATIENT)
Dept: HEALTH INFORMATION MANAGEMENT | Facility: OTHER | Age: 84
End: 2024-09-06

## 2024-09-06 ENCOUNTER — ANTICOAGULATION THERAPY VISIT (OUTPATIENT)
Dept: ANTICOAGULATION | Facility: OTHER | Age: 84
End: 2024-09-06
Attending: FAMILY MEDICINE
Payer: MEDICARE

## 2024-09-06 ENCOUNTER — NURSING HOME VISIT (OUTPATIENT)
Dept: GERIATRICS | Facility: OTHER | Age: 84
End: 2024-09-06
Payer: MEDICARE

## 2024-09-06 DIAGNOSIS — I48.20 CHRONIC A-FIB (H): ICD-10-CM

## 2024-09-06 DIAGNOSIS — Z78.9 NURSING HOME RESIDENT: Primary | ICD-10-CM

## 2024-09-06 DIAGNOSIS — Z79.01 ANTICOAGULATION MONITORING, INR RANGE 2-3: Primary | ICD-10-CM

## 2024-09-06 DIAGNOSIS — I10 ESSENTIAL HYPERTENSION: ICD-10-CM

## 2024-09-06 DIAGNOSIS — I69.328 HEMIPARESIS AND SPEECH AND LANGUAGE DEFICIT AS LATE EFFECTS OF STROKE (H): ICD-10-CM

## 2024-09-06 DIAGNOSIS — I69.359 HEMIPARESIS AND SPEECH AND LANGUAGE DEFICIT AS LATE EFFECTS OF STROKE (H): ICD-10-CM

## 2024-09-06 DIAGNOSIS — E78.5 DYSLIPIDEMIA: ICD-10-CM

## 2024-09-06 LAB — INR (EXTERNAL): 1.8 (ref 0.9–1.1)

## 2024-09-06 PROCEDURE — 99308 SBSQ NF CARE LOW MDM 20: CPT

## 2024-09-06 NOTE — PROGRESS NOTES
ANTICOAGULATION MANAGEMENT     Danette Ramos 83 year old female is on warfarin with subtherapeutic INR result. (Goal INR 2.0-3.0)    Recent labs: (last 7 days)     09/06/24  1441   INR 1.8*       ASSESSMENT     Source(s): Chart Review and Home Care/Facility Nurse     Warfarin doses taken: Warfarin taken as instructed  Diet: No new diet changes identified  Medication/supplement changes: None noted  New illness, injury, or hospitalization: No  Signs or symptoms of bleeding or clotting: No  Previous result: Supratherapeutic  Additional findings: None       PLAN     Recommended plan for no diet, medication or health factor changes affecting INR     Dosing Instructions: Increase your warfarin dose (6.2% change) with next INR in 2 weeks       Summary  As of 9/6/2024      Full warfarin instructions:  3.75 mg every Mon, Wed, Fri; 2.5 mg all other days   Next INR check:  9/20/2024               Faxed dosing and follow up instructions to [facility]    Orders given to  Homecare nurse/facility to recheck    Patient not here, Protime Communicationsheet with screening questions and current INR received via FAX from outside agency. Results reviewed, Warfarin dosing per protocol, and recommended follow-up appointment made. Paperwork FAXED back to facility.     Plan made per [facility] anticoagulation protocol    Ese Beckett, RN  Anticoagulation Clinic  9/6/2024    _______________________________________________________________________     Anticoagulation Episode Summary       Current INR goal:  2.0-3.0   TTR:  37.7% (1 y)   Target end date:  Indefinite   Send INR reminders to:  [facility] [place]    Indications    Anticoagulation monitoring  INR range 2-3 [Z79.01]  Chronic a-fib (H) [I48.20]             Comments:  Lives at [facility] ph [phone] fax [phone]. [address]             Anticoagulation Care Providers       Provider Role Specialty Phone number    Selena Townsend MD Referring Family Medicine  [phone]

## 2024-09-06 NOTE — PROGRESS NOTES
HISTORY OF PRESENT ILLNESS:      Danette is a 83 year old female (1940)  resident of [facility] who is being seen today for a routine LTC visit.     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     Discussed with nursing staff who have the following concerns: None.     Patient is seen in their room. She is resting in her wheelchair. Opens her eyes to voice and responds to questions with unintelligible speech. Appears comfortable.    Current medications, allergies, and interdisciplinary care plan are reviewed.    Patient Active Problem List    Diagnosis Date Noted    Anticoagulation monitoring, INR range 2-3 04/07/2023     Priority: Medium    Hospice care patient 01/07/2018     Priority: Medium    DNR (do not resuscitate) 04/03/2017     Priority: Medium    Physical deconditioning 04/03/2017     Priority: Medium    Delirium 03/03/2017     Priority: Medium    Chest pain 03/03/2017     Priority: Medium    Right leg pain 03/03/2017     Priority: Medium    Generalized weakness 11/01/2015     Priority: Medium    Hemiparesis and speech and language deficit as late effects of stroke (H) 11/01/2015     Priority: Medium    Orthostatic hypotension 05/18/2015     Priority: Medium    Anxiety 05/14/2015     Priority: Medium    Embolic stroke involving posterior cerebral artery (H) 05/11/2015     Priority: Medium    Complex partial seizure (H) 05/11/2015     Priority: Medium    TIA (transient ischemic attack) 05/10/2015     Priority: Medium    Dyslipidemia 11/03/2014     Priority: Medium    Hemiparesis, aphasia, and dysphagia as late effects of stroke (H) 11/03/2014     Priority: Medium    Hypertension 11/03/2014     Priority: Medium    Aphasia 09/16/2014     Priority: Medium    Apraxia 09/16/2014     Priority: Medium    Chronic a-fib (H) 09/16/2014     Priority: Medium    Dysphagia 09/16/2014     Priority: Medium    Hemineglect 09/16/2014     Priority: Medium     Orthostatic tremor 09/16/2014     Priority: Medium    Right hemiparesis (H) 09/16/2014     Priority: Medium    Hypokalemia 09/09/2014     Priority: Medium    Health care directive on file 10/31/2008     Priority: Medium          Social History     Socioeconomic History    Marital status:      Spouse name: Not on file    Number of children: Not on file    Years of education: Not on file    Highest education level: Not on file   Occupational History    Not on file   Tobacco Use    Smoking status: Unknown    Smokeless tobacco: Not on file   Substance and Sexual Activity    Alcohol use: Not Currently    Drug use: Never    Sexual activity: Not Currently   Other Topics Concern    Not on file   Social History Narrative    Not on file     Social Determinants of Health     Financial Resource Strain: Not on file   Food Insecurity: Not on file   Transportation Needs: Not on file   Physical Activity: Not on file   Stress: Not on file   Social Connections: Not on file   Interpersonal Safety: Not on file   Housing Stability: Not on file        Current Outpatient Medications   Medication Sig Dispense Refill    Acetaminophen (TYLENOL PO) Take 1,000 mg by mouth 2 times daily      ACETAMINOPHEN PO Take 650 mg by mouth every 6 hours as needed for pain      Atorvastatin Calcium (LIPITOR PO) Take 20 mg by mouth daily      bisacodyl (DULCOLAX) 10 MG Suppository Place 10 mg rectally PRN every 4 days      bismuth subsalicylate (PEPTO BISMOL) 262 MG/15ML suspension Take 30 mLs by mouth every 4 hours as needed for indigestion      diltiazem (CARDIZEM) 120 MG tablet Take 120 mg by mouth daily      ketoconazole (NIZORAL) 2 % external shampoo Twice weekly      levETIRAcetam (KEPPRA) 750 MG tablet Take 750 mg by mouth 2 times daily      magnesium hydroxide (MILK OF MAGNESIA) 400 MG/5ML suspension Take 30 mLs by mouth daily as needed PRN      metoprolol tartrate (LOPRESSOR) 25 MG tablet Take 1 tablet (25 mg) by mouth 2 times daily  (Patient taking differently: Take 25 mg by mouth daily.) 60 tablet 0    nystatin (MYCOSTATIN) 750486 UNIT/GM external powder Apply topically 2 times daily prn      risperiDONE (RISPERDAL) 0.5 MG tablet Take 0.125 mg by mouth daily      senna-docusate (SENOKOT-S;PERICOLACE) 8.6-50 MG per tablet Take 2 tablets by mouth daily      SERTRALINE HCL PO Take 50 mg by mouth daily      Vitamin D, Cholecalciferol, 1000 units CAPS Take 2,000 Units by mouth daily (Patient taking differently: Take 1,000 Units by mouth daily.) 30 capsule 0    Warfarin Sodium (COUMADIN PO) Take 2.5 mg by mouth daily       No current facility-administered medications for this visit.       Allergies   Allergen Reactions    Bacitracin Rash     Contact dermatitis       I have reviewed the care plan and do agree with the plan.    ROS:  Unable to obtain due to pt factors.     OBJECTIVE:  There were no vitals taken for this visit.    GENERAL: Alert. In no acute distress  RESP:  Lungs clear.  No rales, rhonchi, or wheezing  CV:  Irregularly irregular.  S1 S2 with no murmur. No clicks or rubs.  ABD: Soft, non tender, non distended, no organomegaly. BS are normal. rashes. No ulcerations to feet.   PSYCH:  Affect is smiling  NEURO: Alerts to voice. Does not follow commands.  EXTREM:  No edema.    Lab/Diagnostic data:    Reviewed in Epic    ASSESSMENT/ORDERS:    Diagnoses and all orders for this visit:    Cerebrovascular accident (CVA) due to embolism of posterior cerebral artery, unspecified blood vessel laterality (H) . Hemiparesis, aphasia, and dysphagia as late effects of stroke (H) / Dysphagia, unspecified type  Pt severely disabled after stroke in 2017. She is non ambulatory, does occasionally speak in short sentences/phrases per family, speech is unintelligible. Aphasia, dysphagia and cog impairment have been stable. Weights reviewed, stable around 144 lbs.    Chronic a-fib (H)   On warfarin. INR 3.2 on 8/23 with decreased dosing. Repeat is pending  today. Follows with coumadin clinic. No s/s of bleeding. Continue CCB, BB. Rate controlled- 60's to 70's. Continue current therapy.     Partial symptomatic epilepsy with complex partial seizures, not intractable, without status epilepticus (H)  No seizure activity. On keppra. Level continues to be mildly elevated, most recent at 57 on 8/6. This was verified staff that a trough was not obtained. Orders were given previously to obtain trough which is now planned for next week (staff will come in early to ensure trough level rather than on facility lab day). Consider decrease based on result.      Hypertension, unspecified type  Monitored once weekly. One low reading with SBP 90's. Will have staff monitor daily over the next week, if further lows will decrease medication.    Dementia with anxiety, unspecified dementia severity, unspecified dementia type / Anxiety  Mood/affect very stable per staff. Continue risperdal at current dosage, as taper resulted in significant worsening of anxiety per staff and family. Currently on 0.125 mg daily only. No clear adverse effect.    Dyslipidemia  Continue statin given history of CVA.     Chronic systolic congestive heart failure (H)  Not on diuretics. EF 40% in 2018. Currently on metoprolol. Euvolemic. Monitor.     Routine labs: CBC, BMP, keppra every 6 months    Total time spent with patient visit was 25 min including patient visit, review of pertinent clinical information, and treatment plan.    Janeth Larkin, CNP

## 2024-09-11 ENCOUNTER — TELEPHONE (OUTPATIENT)
Dept: GERIATRICS | Facility: OTHER | Age: 84
End: 2024-09-11

## 2024-09-20 ENCOUNTER — ANTICOAGULATION THERAPY VISIT (OUTPATIENT)
Dept: ANTICOAGULATION | Facility: OTHER | Age: 84
End: 2024-09-20
Attending: FAMILY MEDICINE
Payer: MEDICARE

## 2024-09-20 ENCOUNTER — TRANSFERRED RECORDS (OUTPATIENT)
Dept: HEALTH INFORMATION MANAGEMENT | Facility: CLINIC | Age: 84
End: 2024-09-20

## 2024-09-20 DIAGNOSIS — Z79.01 ANTICOAGULATION MONITORING, INR RANGE 2-3: Primary | ICD-10-CM

## 2024-09-20 DIAGNOSIS — I48.20 CHRONIC A-FIB (H): ICD-10-CM

## 2024-09-20 LAB — INR (EXTERNAL): 1.9 (ref 0.9–1.1)

## 2024-09-20 NOTE — PROGRESS NOTES
ANTICOAGULATION MANAGEMENT     Danette Ramos 83 year old female is on warfarin with subtherapeutic INR result. (Goal INR 2.0-3.0)    Recent labs: (last 7 days)     09/20/24  1406   INR 1.9*       ASSESSMENT     Source(s): Chart Review and Home Care/Facility Nurse     Warfarin doses taken: Warfarin taken as instructed  Diet: No new diet changes identified  Medication/supplement changes: None noted  New illness, injury, or hospitalization: No  Signs or symptoms of bleeding or clotting: No  Previous result: Subtherapeutic  Additional findings: None       PLAN     Recommended plan for no diet, medication or health factor changes affecting INR     Dosing Instructions: Continue your current warfarin dose with next INR in 2 weeks       Summary  As of 9/20/2024      Full warfarin instructions:  3.75 mg every Mon, Wed, Fri; 2.5 mg all other days   Next INR check:  10/4/2024               Faxed dosing and follow up instructions to [facility]    Orders given to  Homecare nurse/facility to recheck    Patient not here, Protime Communication sheet with screening questions and current INR received via FAX from outside agency. Results reviewed, Warfarin dosing per protocol, and recommended follow-up appointment made. Paperwork FAXED back to facility.       Plan made per [facility] anticoagulation protocol    Ese Beckett RN  9/20/2024  Anticoagulation Clinic  Pixtr for routing messages: p MAMIE BARTHOLOMEW          _______________________________________________________________________     Anticoagulation Episode Summary       Current INR goal:  2.0-3.0   TTR:  37.7% (1 y)   Target end date:  Indefinite   Send INR reminders to:  M[facility] BARTHOLOMEW    Indications    Anticoagulation monitoring  INR range 2-3 [Z79.01]  Chronic a-fib (H) [I48.20]             Comments:  Lives at [facility] [phone] fax [phone]. [address]             Anticoagulation Care Providers       Provider Role Specialty Phone number     Selena Townsend MD [facility] Medicine [phone]

## 2024-10-04 ENCOUNTER — ANTICOAGULATION THERAPY VISIT (OUTPATIENT)
Dept: ANTICOAGULATION | Facility: OTHER | Age: 84
End: 2024-10-04
Attending: FAMILY MEDICINE
Payer: MEDICARE

## 2024-10-04 ENCOUNTER — TRANSFERRED RECORDS (OUTPATIENT)
Dept: HEALTH INFORMATION MANAGEMENT | Facility: OTHER | Age: 84
End: 2024-10-04

## 2024-10-04 DIAGNOSIS — Z79.01 ANTICOAGULATION MONITORING, INR RANGE 2-3: Primary | ICD-10-CM

## 2024-10-04 DIAGNOSIS — I48.20 CHRONIC A-FIB (H): ICD-10-CM

## 2024-10-04 LAB — INR POINT OF CARE: 1.8 (ref 0.9–1.1)

## 2024-10-04 PROCEDURE — 85610 PROTHROMBIN TIME: CPT | Mod: ZL,QW

## 2024-10-04 NOTE — PROGRESS NOTES
ANTICOAGULATION MANAGEMENT     Danette Ramos 83 year old female is on warfarin with subtherapeutic INR result. (Goal INR 2.0-3.0)    Recent labs: (last 7 days)     10/04/24  0531   INR 1.8*       ASSESSMENT     Source(s): Home care/ facility nurse     Warfarin doses taken: Warfarin taken as instructed  Diet: No new diet changes identified  New illness, injury, or hospitalization: No  Medication/supplement changes: None noted  Signs or symptoms of bleeding or clotting: No  Previous INR: Subtherapeutic  Additional findings: None     PLAN     Recommended plan for no diet, medication or health factor changes affecting INR     Dosing Instructions: Increase your warfarin dose (5.9% change) with next INR in 2 weeks       Summary  As of 10/4/2024      Full warfarin instructions:  2.5 mg every Mon, Wed, Fri; 3.75 mg all other days   Next INR check:  10/18/2024               Faxed dosing and follow up instructions to [facility]    Orders given to  Homecare nurse/facility to recheck    Education provided: None required    Plan made per [facility] anticoagulation protocol    Emma Tobias, RN  Anticoagulation Clinic  10/4/2024    _______________________________________________________________________     Anticoagulation Episode Summary       Current INR goal:  2.0-3.0   TTR:  37.7% (1 y)   Target end date:  Indefinite   Send INR reminders to:  [facility] [place]    Indications    Anticoagulation monitoring  INR range 2-3 [Z79.01]  Chronic a-fib (H) [I48.20]             Comments:  Lives at [facility] [phone] fax [phone]. [address]             Anticoagulation Care Providers       Provider Role Specialty Phone number    Selena Townsend MD Referring Family Medicine [phone]

## 2024-10-18 ENCOUNTER — ANTICOAGULATION THERAPY VISIT (OUTPATIENT)
Dept: ANTICOAGULATION | Facility: OTHER | Age: 84
End: 2024-10-18
Attending: FAMILY MEDICINE
Payer: MEDICARE

## 2024-10-18 ENCOUNTER — TRANSFERRED RECORDS (OUTPATIENT)
Dept: HEALTH INFORMATION MANAGEMENT | Facility: OTHER | Age: 84
End: 2024-10-18

## 2024-10-18 DIAGNOSIS — Z79.01 ANTICOAGULATION MONITORING, INR RANGE 2-3: Primary | ICD-10-CM

## 2024-10-18 DIAGNOSIS — I48.20 CHRONIC A-FIB (H): ICD-10-CM

## 2024-10-18 LAB — INR (EXTERNAL): 2 (ref 0.9–1.1)

## 2024-10-18 NOTE — PROGRESS NOTES
ANTICOAGULATION MANAGEMENT     Danette Ramos 84 year old female is on warfarin with therapeutic INR result. (Goal INR 2.0-3.0)    Recent labs: (last 7 days)     10/18/24  1207   INR 2.0*       ASSESSMENT     Source(s): Chart Review and Home Care/Facility Nurse     Warfarin doses taken: Warfarin taken as instructed  Diet: No new diet changes identified  Medication/supplement changes: None noted  New illness, injury, or hospitalization: No  Signs or symptoms of bleeding or clotting: No  Previous result: Subtherapeutic  Additional findings: None       PLAN     Recommended plan for no diet, medication or health factor changes affecting INR     Dosing Instructions: Continue your current warfarin dose with next INR in 2 weeks       Summary  As of 10/18/2024      Full warfarin instructions:  2.5 mg every Mon, Wed, Fri; 3.75 mg all other days   Next INR check:  11/1/2024               Faxed dosing and follow up instructions to [facility]    Orders given to  Homecare nurse/facility to recheck    Patient not here, Protime Communicationsheet with screening questions and current INR received via FAX from outside agency. Results reviewed, Warfarin dosing per protocol, and recommended follow-up appointment made. Paperwork FAXED back to facility.       Plan made per [facility] anticoagulation protocol    Ese Beckett RN  10/18/2024  Anticoagulation Clinic  CriticalBlue for routing messages: rosalba BARTHOLOMEW          _______________________________________________________________________     Anticoagulation Episode Summary       Current INR goal:  2.0-3.0   TTR:  37.7% (1 y)   Target end date:  Indefinite   Send INR reminders to:  MAMIE BARTHOLOMEW    Indications    Anticoagulation monitoring  INR range 2-3 [Z79.01]  Chronic a-fib (H) [I48.20]             Comments:  Lives at [facility] [phone] fax [phone]. [address]             Anticoagulation Care Providers       Provider Role Specialty Phone number     Selena Townsend MD [facility] Medicine [phone]

## 2024-11-01 ENCOUNTER — ANTICOAGULATION THERAPY VISIT (OUTPATIENT)
Dept: ANTICOAGULATION | Facility: OTHER | Age: 84
End: 2024-11-01
Payer: MEDICARE

## 2024-11-01 ENCOUNTER — TRANSFERRED RECORDS (OUTPATIENT)
Dept: HEALTH INFORMATION MANAGEMENT | Facility: OTHER | Age: 84
End: 2024-11-01

## 2024-11-01 DIAGNOSIS — Z79.01 ANTICOAGULATION MONITORING, INR RANGE 2-3: Primary | ICD-10-CM

## 2024-11-01 DIAGNOSIS — I48.20 CHRONIC A-FIB (H): ICD-10-CM

## 2024-11-01 LAB — INR (EXTERNAL): 1.9 (ref 0.9–1.1)

## 2024-11-01 NOTE — PROGRESS NOTES
ANTICOAGULATION MANAGEMENT     Danette Ramos 84 year old female is on warfarin with subtherapeutic INR result. (Goal INR 2.0-3.0)    Recent labs: (last 7 days)     11/01/24  0759   INR 1.9*       ASSESSMENT     Source(s): Chart Review and Home Care/Facility Nurse     Warfarin doses taken: Warfarin taken as instructed  Diet: No new diet changes identified  Medication/supplement changes: None noted  New illness, injury, or hospitalization: No  Signs or symptoms of bleeding or clotting: No  Previous result: Therapeutic last visit; previously outside of goal range  Additional findings: None       PLAN     Recommended plan for no diet, medication or health factor changes affecting INR     Dosing Instructions: Increase your warfarin dose (5.6% change) with next INR in 2 weeks       Summary  As of 11/1/2024      Full warfarin instructions:  2.5 mg every Mon, Fri; 3.75 mg all other days   Next INR check:  11/15/2024               Faxed dosing and follow up instructions to [facility]    Orders given to  Homecare nurse/facility to recheck    Patient not here, Protime Communicationsheet with screening questions and current INR received via FAX from outside agency. Results reviewed, Warfarin dosing per protocol, and recommended follow-up appointment made. Paperwork FAXED back to facility.       Plan made per [facility] anticoagulation protocol    Ese Beckett RN  11/1/2024  Anticoagulation Clinic  [employer] Interactive for routing messages: rosalba BARTHOLOMEW  [facility] patient phone line: [phone]        _______________________________________________________________________     Anticoagulation Episode Summary       Current INR goal:  2.0-3.0   TTR:  34.9% (1 y)   Target end date:  Indefinite   Send INR reminders to:  MAMIE BARTHOLOMEW    Indications    Anticoagulation monitoring  INR range 2-3 [Z79.01]  Chronic a-fib (H) [I48.20]             Comments:  Lives at [facility] [phone] fax [phone]. [address]              Anticoagulation Care Providers       Provider Role Specialty Phone number    Selena Townsend MD Referring Family Medicine [phone]

## 2024-11-04 VITALS
RESPIRATION RATE: 16 BRPM | OXYGEN SATURATION: 96 % | BODY MASS INDEX: 25.81 KG/M2 | SYSTOLIC BLOOD PRESSURE: 125 MMHG | HEART RATE: 85 BPM | DIASTOLIC BLOOD PRESSURE: 77 MMHG | TEMPERATURE: 97.7 F | WEIGHT: 145.7 LBS

## 2024-11-07 ENCOUNTER — NURSING HOME VISIT (OUTPATIENT)
Dept: GERIATRICS | Facility: OTHER | Age: 84
End: 2024-11-07
Attending: FAMILY MEDICINE
Payer: MEDICARE

## 2024-11-07 ENCOUNTER — TELEPHONE (OUTPATIENT)
Dept: GERIATRICS | Facility: OTHER | Age: 84
End: 2024-11-07

## 2024-11-07 DIAGNOSIS — Z78.9 NURSING HOME RESIDENT: Primary | ICD-10-CM

## 2024-11-07 NOTE — TELEPHONE ENCOUNTER
Note from facility:   Dr. Townsend was wondering if Skye Ramos,  1940, is having regular bowel movements and if Senna could be reduced. She is having 1-2 bowel movements per day. Could we try decreasing from 2 Senna BID to 1 Senna BID?

## 2024-11-11 NOTE — PROGRESS NOTES
HISTORY OF PRESENT ILLNESS:      Danette is a 83 year old female (1940)  resident of [facility] who is being seen today for a routine LTC visit.     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     Discussed with nursing staff who have the following concerns: None.     Patient is seen in their room. She is resting in her wheelchair. Opens her eyes to voice and responds to questions with one word answers or nods, etc. She is tearful today. When asked more detail about symptoms she answers affirmatively to bowel or abdominal discomfort. Nods that they are loose and cramping.     Spoke with daughter on the phone today as well. She has noted more bad days than good days with regard to mood. She does have historical issues with constipation that have been severe.     Current medications, allergies, and interdisciplinary care plan are reviewed.    Patient Active Problem List    Diagnosis Date Noted    Anticoagulation monitoring, INR range 2-3 04/07/2023     Priority: Medium    Hospice care patient 01/07/2018     Priority: Medium    DNR (do not resuscitate) 04/03/2017     Priority: Medium    Physical deconditioning 04/03/2017     Priority: Medium    Delirium 03/03/2017     Priority: Medium    Chest pain 03/03/2017     Priority: Medium    Right leg pain 03/03/2017     Priority: Medium    Generalized weakness 11/01/2015     Priority: Medium    Hemiparesis and speech and language deficit as late effects of stroke (H) 11/01/2015     Priority: Medium    Orthostatic hypotension 05/18/2015     Priority: Medium    Anxiety 05/14/2015     Priority: Medium    Embolic stroke involving posterior cerebral artery (H) 05/11/2015     Priority: Medium    Complex partial seizure (H) 05/11/2015     Priority: Medium    TIA (transient ischemic attack) 05/10/2015     Priority: Medium    Dyslipidemia 11/03/2014     Priority: Medium    Hemiparesis, aphasia, and dysphagia as  late effects of stroke (H) 11/03/2014     Priority: Medium    Hypertension 11/03/2014     Priority: Medium    Aphasia 09/16/2014     Priority: Medium    Apraxia 09/16/2014     Priority: Medium    Chronic a-fib (H) 09/16/2014     Priority: Medium    Dysphagia 09/16/2014     Priority: Medium    Hemineglect 09/16/2014     Priority: Medium    Orthostatic tremor 09/16/2014     Priority: Medium    Right hemiparesis (H) 09/16/2014     Priority: Medium    Hypokalemia 09/09/2014     Priority: Medium    Health care directive on file 10/31/2008     Priority: Medium          Social History     Socioeconomic History    Marital status:      Spouse name: Not on file    Number of children: Not on file    Years of education: Not on file    Highest education level: Not on file   Occupational History    Not on file   Tobacco Use    Smoking status: Unknown    Smokeless tobacco: Not on file   Substance and Sexual Activity    Alcohol use: Not Currently    Drug use: Never    Sexual activity: Not Currently   Other Topics Concern    Not on file   Social History Narrative    Not on file     Social Drivers of Health     Financial Resource Strain: Not on file   Food Insecurity: Not on file   Transportation Needs: Not on file   Physical Activity: Not on file   Stress: Not on file   Social Connections: Not on file   Interpersonal Safety: Not on file   Housing Stability: Not on file        Current Outpatient Medications   Medication Sig Dispense Refill    Acetaminophen (TYLENOL PO) Take 1,000 mg by mouth 2 times daily      ACETAMINOPHEN PO Take 650 mg by mouth every 6 hours as needed for pain      Atorvastatin Calcium (LIPITOR PO) Take 20 mg by mouth daily      bisacodyl (DULCOLAX) 10 MG Suppository Place 10 mg rectally PRN every 4 days      bismuth subsalicylate (PEPTO BISMOL) 262 MG/15ML suspension Take 30 mLs by mouth every 4 hours as needed for indigestion      diltiazem (CARDIZEM) 120 MG tablet Take 120 mg by mouth daily       ketoconazole (NIZORAL) 2 % external shampoo Twice weekly      levETIRAcetam (KEPPRA) 750 MG tablet Take 750 mg by mouth 2 times daily      magnesium hydroxide (MILK OF MAGNESIA) 400 MG/5ML suspension Take 30 mLs by mouth daily as needed PRN      metoprolol tartrate (LOPRESSOR) 25 MG tablet Take 1 tablet (25 mg) by mouth 2 times daily (Patient taking differently: Take 25 mg by mouth daily.) 60 tablet 0    nystatin (MYCOSTATIN) 779367 UNIT/GM external powder Apply topically 2 times daily prn      risperiDONE (RISPERDAL) 0.5 MG tablet Take 0.125 mg by mouth daily      senna-docusate (SENOKOT-S;PERICOLACE) 8.6-50 MG per tablet Take 1 tablet by mouth 2 times daily.      SERTRALINE HCL PO Take 50 mg by mouth daily      Vitamin D, Cholecalciferol, 1000 units CAPS Take 2,000 Units by mouth daily (Patient taking differently: Take 1,000 Units by mouth daily.) 30 capsule 0    Warfarin Sodium (COUMADIN PO) Take 2.5 mg by mouth daily.  Give 3.75 mg by mouth one time a day every Tue, Wed, Thu, Sat, Sun related to CHRONIC ATRIAL FIBRILLATION, UNSPECIFIED (I48.20) recheck INR 11/15/24 AND Give 2.5 mg by mouth one time a day every Mon, Fri related to CHRONIC ATRIAL FIBRILLATION, UNSPECIFIED (I48.20) Recheck INR 11/15/24       No current facility-administered medications for this visit.       Allergies   Allergen Reactions    Bacitracin Rash     Contact dermatitis       I have reviewed the care plan and do agree with the plan.    ROS:  Unable to obtain due to pt factors. See HPI.     OBJECTIVE:  /77   Pulse 85   Temp 97.7  F (36.5  C)   Resp 16   Wt 66.1 kg (145 lb 11.2 oz)   SpO2 96%   BMI 25.81 kg/m      GENERAL: Alert. In no acute distress  RESP:  Lungs clear.  No rales, rhonchi, or wheezing  CV:  Irregularly irregular.  S1 S2 with no murmur. No clicks or rubs.  ABD: Soft, possible upper abd tenderness, no rebound or guarding,   PSYCH:  Affect is  tearful  NEURO: Alerts to voice. Does not follow commands.  EXTREM:  No  edema.    Lab/Diagnostic data:    Reviewed in Epic    ASSESSMENT/ORDERS:    Diagnoses and all orders for this visit:    Abdominal pain  Possible based on limited history and exam today. Also note  possible loose stools. Request nursing track BMs and decrease senna to once daily.     Cerebrovascular accident (CVA) due to embolism of posterior cerebral artery, unspecified blood vessel laterality (H) . Hemiparesis, aphasia, and dysphagia as late effects of stroke (H) / Dysphagia, unspecified type  Pt severely disabled after stroke in 2017. She is non ambulatory, does occasionally speak in short sentences/phrases per family, speech is unintelligible. Aphasia, dysphagia and cog impairment have been stable. Weights have been very stable    Chronic a-fib (H)   On warfarin, followed by coumadin clinic. Continue CCB, BB. Rate controlled- 60's to 70's. Continue current therapy.     Partial symptomatic epilepsy with complex partial seizures, not intractable, without status epilepticus (H)  Stable, no recent seizures. Keppra level wnl 10/15.     Hypertension, unspecified type  Stable    Dementia with anxiety, unspecified dementia severity, unspecified dementia type / Anxiety  Mood slowly declining per family. Continue risperdal at current dosage, as taper resulted in significant worsening of anxiety per staff and family. Increase zoloft to 75mg daily.     Dyslipidemia  Continue statin given history of CVA.     Chronic systolic congestive heart failure (H)  Not on diuretics. EF 40% in 2018. Currently on metoprolol. Euvolemic. Monitor.     Routine labs: CBC, BMP, keppra every 6 months    Total time spent with patient visit was 30 min including patient visit, review of pertinent clinical information, and treatment plan.    Selena Townsend MD

## 2024-11-15 ENCOUNTER — TRANSFERRED RECORDS (OUTPATIENT)
Dept: HEALTH INFORMATION MANAGEMENT | Facility: OTHER | Age: 84
End: 2024-11-15

## 2024-11-15 ENCOUNTER — ANTICOAGULATION THERAPY VISIT (OUTPATIENT)
Dept: ANTICOAGULATION | Facility: OTHER | Age: 84
End: 2024-11-15
Attending: FAMILY MEDICINE
Payer: MEDICARE

## 2024-11-15 DIAGNOSIS — Z79.01 ANTICOAGULATION MONITORING, INR RANGE 2-3: Primary | ICD-10-CM

## 2024-11-15 DIAGNOSIS — I48.20 CHRONIC A-FIB (H): ICD-10-CM

## 2024-11-15 LAB — INR (EXTERNAL): 2 (ref 0.9–1.1)

## 2024-11-15 NOTE — PROGRESS NOTES
ANTICOAGULATION MANAGEMENT     Danette Ramos 84 year old female is on warfarin with therapeutic INR result. (Goal INR 2.0-3.0)    Recent labs: (last 7 days)     11/15/24  1246   INR 2.0*       ASSESSMENT     Source(s): Chart Review and Home Care/Facility Nurse     Warfarin doses taken: Warfarin taken as instructed  Diet: No new diet changes identified  Medication/supplement changes: None noted  New illness, injury, or hospitalization: No  Signs or symptoms of bleeding or clotting: No  Previous result: Subtherapeutic  Additional findings: None       PLAN     Recommended plan for no diet, medication or health factor changes affecting INR     Dosing Instructions: Continue your current warfarin dose with next INR in 3 weeks       Summary  As of 11/15/2024      Full warfarin instructions:  2.5 mg every Mon, Fri; 3.75 mg all other days   Next INR check:  12/6/2024               Faxed dosing and follow up instructions to [facility]    Orders given to  Homecare nurse/facility to recheck    Patient not here, [employer] with screening questions and current INR received via FAX from outside agency. Results reviewed, Warfarin dosing per protocol, and recommended follow-up appointment made. Paperwork FAXED back to facility.       Plan made per [facility] anticoagulation protocol    Ese Beckett RN  11/15/2024  Anticoagulation Clinic  [place] for routing messages: rosalba BARTHOLOMEW  [facility] patient phone line: [phone]        _______________________________________________________________________     Anticoagulation Episode Summary       Current INR goal:  2.0-3.0   TTR:  34.5% (1 y)   Target end date:  Indefinite   Send INR reminders to:  MAMIE BARTHOLOMEW    Indications    Anticoagulation monitoring  INR range 2-3 [Z79.01]  Chronic a-fib (H) [I48.20]             Comments:  Lives at [facility] [phone] fax [phone]. [address]             Anticoagulation Care Providers       Provider  Role Specialty Phone number    Selena Townsend MD Referring Family Medicine [phone]

## 2024-12-06 ENCOUNTER — TRANSFERRED RECORDS (OUTPATIENT)
Dept: HEALTH INFORMATION MANAGEMENT | Facility: OTHER | Age: 84
End: 2024-12-06

## 2024-12-20 ENCOUNTER — TRANSFERRED RECORDS (OUTPATIENT)
Dept: HEALTH INFORMATION MANAGEMENT | Facility: OTHER | Age: 84
End: 2024-12-20

## 2025-01-03 ENCOUNTER — TRANSFERRED RECORDS (OUTPATIENT)
Dept: HEALTH INFORMATION MANAGEMENT | Facility: OTHER | Age: 85
End: 2025-01-03

## 2025-01-03 ENCOUNTER — MEDICAL CORRESPONDENCE (OUTPATIENT)
Dept: HEALTH INFORMATION MANAGEMENT | Facility: HOSPITAL | Age: 85
End: 2025-01-03

## 2025-01-17 ENCOUNTER — TRANSFERRED RECORDS (OUTPATIENT)
Dept: HEALTH INFORMATION MANAGEMENT | Facility: OTHER | Age: 85
End: 2025-01-17

## 2025-01-21 VITALS
WEIGHT: 141.5 LBS | BODY MASS INDEX: 25.07 KG/M2 | RESPIRATION RATE: 18 BRPM | OXYGEN SATURATION: 92 % | TEMPERATURE: 97.7 F | SYSTOLIC BLOOD PRESSURE: 113 MMHG | HEIGHT: 63 IN | DIASTOLIC BLOOD PRESSURE: 70 MMHG | HEART RATE: 80 BPM

## 2025-01-22 ENCOUNTER — NURSING HOME VISIT (OUTPATIENT)
Dept: GERIATRICS | Facility: OTHER | Age: 85
End: 2025-01-22
Payer: MEDICARE

## 2025-01-22 DIAGNOSIS — Z78.9 NURSING HOME RESIDENT: Primary | ICD-10-CM

## 2025-01-22 NOTE — PROGRESS NOTES
HISTORY OF PRESENT ILLNESS:      Danette is a 84 year old female (1940) resident of [facility] who is being seen today for a routine LTC visit.     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     Discussed with nursing staff who have the following concerns: None.     Patient is seen in their room. She is resting in bed. She alerts to voice. Speech is incomprehensible. She appears comfortable.       Current medications, allergies, and interdisciplinary care plan are reviewed.    Patient Active Problem List    Diagnosis Date Noted    Anticoagulation monitoring, INR range 2-3 04/07/2023     Priority: Medium    Hospice care patient 01/07/2018     Priority: Medium    DNR (do not resuscitate) 04/03/2017     Priority: Medium    Physical deconditioning 04/03/2017     Priority: Medium    Delirium 03/03/2017     Priority: Medium    Chest pain 03/03/2017     Priority: Medium    Right leg pain 03/03/2017     Priority: Medium    Generalized weakness 11/01/2015     Priority: Medium    Hemiparesis and speech and language deficit as late effects of stroke (H) 11/01/2015     Priority: Medium    Orthostatic hypotension 05/18/2015     Priority: Medium    Anxiety 05/14/2015     Priority: Medium    Embolic stroke involving posterior cerebral artery (H) 05/11/2015     Priority: Medium    Complex partial seizure (H) 05/11/2015     Priority: Medium    TIA (transient ischemic attack) 05/10/2015     Priority: Medium    Dyslipidemia 11/03/2014     Priority: Medium    Hemiparesis, aphasia, and dysphagia as late effects of stroke (H) 11/03/2014     Priority: Medium    Hypertension 11/03/2014     Priority: Medium    Aphasia 09/16/2014     Priority: Medium    Apraxia 09/16/2014     Priority: Medium    Chronic a-fib (H) 09/16/2014     Priority: Medium    Dysphagia 09/16/2014     Priority: Medium    Hemineglect 09/16/2014     Priority: Medium    Orthostatic tremor 09/16/2014      Priority: Medium    Right hemiparesis (H) 09/16/2014     Priority: Medium    Hypokalemia 09/09/2014     Priority: Medium    Health care directive on file 10/31/2008     Priority: Medium          Social History     Socioeconomic History    Marital status:      Spouse name: Not on file    Number of children: Not on file    Years of education: Not on file    Highest education level: Not on file   Occupational History    Not on file   Tobacco Use    Smoking status: Unknown    Smokeless tobacco: Not on file   Substance and Sexual Activity    Alcohol use: Not Currently    Drug use: Never    Sexual activity: Not Currently   Other Topics Concern    Not on file   Social History Narrative    Not on file     Social Drivers of Health     Financial Resource Strain: Not on file   Food Insecurity: Not on file   Transportation Needs: Not on file   Physical Activity: Not on file   Stress: Not on file   Social Connections: Not on file   Interpersonal Safety: Not on file   Housing Stability: Not on file        Current Outpatient Medications   Medication Sig Dispense Refill    Acetaminophen (TYLENOL PO) Take 1,000 mg by mouth 2 times daily      ACETAMINOPHEN PO Take 650 mg by mouth every 6 hours as needed for pain      Atorvastatin Calcium (LIPITOR PO) Take 20 mg by mouth daily      bisacodyl (DULCOLAX) 10 MG Suppository Place 10 mg rectally PRN every 4 days      bismuth subsalicylate (PEPTO BISMOL) 262 MG/15ML suspension Take 30 mLs by mouth every 4 hours as needed for indigestion      diltiazem (CARDIZEM) 120 MG tablet Take 120 mg by mouth daily      ketoconazole (NIZORAL) 2 % external shampoo Twice weekly      levETIRAcetam (KEPPRA) 750 MG tablet Take 750 mg by mouth 2 times daily      magnesium hydroxide (MILK OF MAGNESIA) 400 MG/5ML suspension Take 30 mLs by mouth daily as needed PRN      metoprolol tartrate (LOPRESSOR) 25 MG tablet Take 1 tablet (25 mg) by mouth 2 times daily (Patient taking differently: Take 25 mg by  "mouth daily.) 60 tablet 0    nystatin (MYCOSTATIN) 784830 UNIT/GM external powder Apply topically 2 times daily prn      risperiDONE (RISPERDAL) 0.5 MG tablet Take 0.125 mg by mouth daily      senna-docusate (SENOKOT-S;PERICOLACE) 8.6-50 MG per tablet Take 1 tablet by mouth 2 times daily.      SERTRALINE HCL PO Take 75 mg by mouth daily.      Vitamin D, Cholecalciferol, 1000 units CAPS Take 2,000 Units by mouth daily (Patient taking differently: Take 1,000 Units by mouth daily.) 30 capsule 0    Warfarin Sodium (COUMADIN PO) Take 2.5 mg by mouth daily. Give 3.75 mg by mouth one time a day every Tue, Wed, Thu, Sat, Sun related to CHRONIC ATRIAL FIBRILLATION, UNSPECIFIED (I48.20) recheck INR 1/31/25 AND Give 2.5 mg by mouth one time a day every Mon, Fri related to CHRONIC ATRIAL FIBRILLATION, UNSPECIFIED (I48.20) Recheck INR 1/31/25       No current facility-administered medications for this visit.       Allergies   Allergen Reactions    Bacitracin Rash     Contact dermatitis       I have reviewed the care plan and do agree with the plan.    ROS:  Unable to obtain due to pt factors. See HPI.     OBJECTIVE:  /70   Pulse 80   Temp 97.7  F (36.5  C)   Resp 18   Ht 1.6 m (5' 3\")   Wt 64.2 kg (141 lb 8 oz)   SpO2 92%   BMI 25.07 kg/m      GENERAL: In no acute distress.  RESP:  Lungs clear.  No rales, rhonchi, or wheezing  CV:  Irregularly irregular.  S1 S2 with no murmur. No clicks or rubs.  ABD: Soft, non-tender, active BS, no rebound or guarding,   PSYCH:  Affect is flat.  NEURO: Alerts to voice. Does not follow commands.  EXTREM:  No edema.    Lab/Diagnostic data:    Reviewed in Epic    ASSESSMENT/ORDERS:    Diagnoses and all orders for this visit:      Cerebrovascular accident (CVA) due to embolism of posterior cerebral artery, unspecified blood vessel laterality (H) . Hemiparesis, aphasia, and dysphagia as late effects of stroke (H) / Dysphagia, unspecified type  Pt severely disabled after stroke in 2017. " She is non ambulatory, does occasionally speak in short sentences/phrases per family, speech is unintelligible. Aphasia, dysphagia and cog impairment have been stable. Weights are variable, most recent  141 lbs - this is relatively stable from Sept. 2024.    Chronic a-fib (H)   On warfarin, followed by coumadin clinic. IINR elevated on 1/17 with planned repeat in 2 weeks. Continue CCB, BB. Rate controlled- 70-80's upon facility record. Continue current therapy.     Partial symptomatic epilepsy with complex partial seizures, not intractable, without status epilepticus (H)  Stable, no recent seizures. Keppra level wnl 10/15. Ordered routine every 6 months.    Hypertension, unspecified type  Controlled. -120's.    Dementia with anxiety, unspecified dementia severity, unspecified dementia type / Anxiety  Mood slowly declining per family. Continue risperdal at current dosage, as taper resulted in significant worsening of anxiety per staff and family. Sertraline increased to 75mg in November.Spoke to daughter, Brooke over the telephone today - feels her last several visits with Danette have been good, mood is stable.  Staff agree.  No further change in medication today.    Dyslipidemia  Continue statin given history of CVA. Will add lipid profile to routine labs in February.    Chronic systolic congestive heart failure (H)  Not on diuretics. EF 40% in 2018. Currently on metoprolol. Euvolemic. Monitor.     Vitamin d deficiency  On supplement. No recent value. Will obtain with labs.    Routine labs: CBC, BMP, keppra every 6 months    Total time spent with patient visit was 30 min including patient visit, review of pertinent clinical information, and treatment plan.    Janeth Larkin, CNP

## 2025-01-31 ENCOUNTER — TRANSFERRED RECORDS (OUTPATIENT)
Dept: HEALTH INFORMATION MANAGEMENT | Facility: OTHER | Age: 85
End: 2025-01-31

## 2025-02-14 ENCOUNTER — TRANSFERRED RECORDS (OUTPATIENT)
Dept: HEALTH INFORMATION MANAGEMENT | Facility: OTHER | Age: 85
End: 2025-02-14

## 2025-02-28 ENCOUNTER — TRANSFERRED RECORDS (OUTPATIENT)
Dept: HEALTH INFORMATION MANAGEMENT | Facility: OTHER | Age: 85
End: 2025-02-28

## 2025-03-05 VITALS
WEIGHT: 140.9 LBS | RESPIRATION RATE: 17 BRPM | OXYGEN SATURATION: 97 % | HEART RATE: 67 BPM | BODY MASS INDEX: 24.96 KG/M2 | DIASTOLIC BLOOD PRESSURE: 62 MMHG | HEIGHT: 63 IN | SYSTOLIC BLOOD PRESSURE: 110 MMHG | TEMPERATURE: 97.5 F

## 2025-03-06 ENCOUNTER — NURSING HOME VISIT (OUTPATIENT)
Dept: GERIATRICS | Facility: OTHER | Age: 85
End: 2025-03-06
Attending: FAMILY MEDICINE
Payer: MEDICARE

## 2025-03-06 DIAGNOSIS — Z78.9 NURSING HOME RESIDENT: Primary | ICD-10-CM

## 2025-03-06 NOTE — PROGRESS NOTES
HISTORY OF PRESENT ILLNESS:      Danette is a 84 year old female (1940) resident of [facility] who is being seen today for a routine LTC visit.     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     Discussed with nursing staff who have the following concerns: Has used MOM 3 times in the last month due to constipation. ? If bowel regimen needs to be increased. Pt ha not used tylenol, nystatin, pepto  - these can be discontinued.     Patient is seen in their room. Pt is quite tired today. She responds to questions but does not open her eyes. She denies any pain today.     Current medications, allergies, and interdisciplinary care plan are reviewed.    Patient Active Problem List    Diagnosis Date Noted    Anticoagulation monitoring, INR range 2-3 04/07/2023     Priority: Medium    Hospice care patient 01/07/2018     Priority: Medium    DNR (do not resuscitate) 04/03/2017     Priority: Medium    Physical deconditioning 04/03/2017     Priority: Medium    Delirium 03/03/2017     Priority: Medium    Chest pain 03/03/2017     Priority: Medium    Right leg pain 03/03/2017     Priority: Medium    Generalized weakness 11/01/2015     Priority: Medium    Hemiparesis and speech and language deficit as late effects of stroke (H) 11/01/2015     Priority: Medium    Orthostatic hypotension 05/18/2015     Priority: Medium    Anxiety 05/14/2015     Priority: Medium    Embolic stroke involving posterior cerebral artery (H) 05/11/2015     Priority: Medium    Complex partial seizure (H) 05/11/2015     Priority: Medium    TIA (transient ischemic attack) 05/10/2015     Priority: Medium    Dyslipidemia 11/03/2014     Priority: Medium    Hemiparesis, aphasia, and dysphagia as late effects of stroke (H) 11/03/2014     Priority: Medium    Hypertension 11/03/2014     Priority: Medium    Aphasia 09/16/2014     Priority: Medium    Apraxia 09/16/2014     Priority: Medium     Chronic a-fib (H) 09/16/2014     Priority: Medium    Dysphagia 09/16/2014     Priority: Medium    Hemineglect 09/16/2014     Priority: Medium    Orthostatic tremor 09/16/2014     Priority: Medium    Right hemiparesis (H) 09/16/2014     Priority: Medium    Hypokalemia 09/09/2014     Priority: Medium    Health care directive on file 10/31/2008     Priority: Medium          Social History     Socioeconomic History    Marital status:      Spouse name: Not on file    Number of children: Not on file    Years of education: Not on file    Highest education level: Not on file   Occupational History    Not on file   Tobacco Use    Smoking status: Unknown    Smokeless tobacco: Not on file   Substance and Sexual Activity    Alcohol use: Not Currently    Drug use: Never    Sexual activity: Not Currently   Other Topics Concern    Not on file   Social History Narrative    Not on file     Social Drivers of Health     Financial Resource Strain: Not on file   Food Insecurity: Not on file   Transportation Needs: Not on file   Physical Activity: Not on file   Stress: Not on file   Social Connections: Not on file   Interpersonal Safety: Not on file   Housing Stability: Not on file        Current Outpatient Medications   Medication Sig Dispense Refill    Acetaminophen (TYLENOL PO) Take 1,000 mg by mouth 2 times daily      ACETAMINOPHEN PO Take 650 mg by mouth every 6 hours as needed for pain      Atorvastatin Calcium (LIPITOR PO) Take 20 mg by mouth daily      bisacodyl (DULCOLAX) 10 MG Suppository Place 10 mg rectally PRN every 4 days      bismuth subsalicylate (PEPTO BISMOL) 262 MG/15ML suspension Take 30 mLs by mouth every 4 hours as needed for indigestion      diltiazem (CARDIZEM) 120 MG tablet Take 120 mg by mouth daily      ketoconazole (NIZORAL) 2 % external shampoo Twice weekly      levETIRAcetam (KEPPRA) 750 MG tablet Take 750 mg by mouth 2 times daily      magnesium hydroxide (MILK OF MAGNESIA) 400 MG/5ML suspension Take  "30 mLs by mouth daily as needed PRN      metoprolol tartrate (LOPRESSOR) 25 MG tablet Take 1 tablet (25 mg) by mouth 2 times daily (Patient taking differently: Take 25 mg by mouth daily.) 60 tablet 0    nystatin (MYCOSTATIN) 086784 UNIT/GM external powder Apply topically 2 times daily prn      risperiDONE (RISPERDAL) 0.5 MG tablet Take 0.125 mg by mouth daily      senna-docusate (SENOKOT-S;PERICOLACE) 8.6-50 MG per tablet Take 1 tablet by mouth 2 times daily.      SERTRALINE HCL PO Take 75 mg by mouth daily.      Vitamin D, Cholecalciferol, 1000 units CAPS Take 2,000 Units by mouth daily (Patient taking differently: Take 1,000 Units by mouth daily.) 30 capsule 0    Warfarin Sodium (COUMADIN PO) Take 2.5 mg by mouth daily. Give 3.75 mg by mouth one time a day every Mon, Thu related to CHRONIC ATRIAL FIBRILLATION, UNSPECIFIED (I48.20) recheck INR 3/7/25 AND Give 2.5 mg by mouth one time a day every Tue, Wed, Fri, Sat, Sun related to CHRONIC ATRIAL FIBRILLATION, UNSPECIFIED (I48.20) Recheck INR 3/7/25       No current facility-administered medications for this visit.       Allergies   Allergen Reactions    Bacitracin Rash     Contact dermatitis       I have reviewed the care plan and do agree with the plan.    ROS:  Unable to obtain due to pt factors. See HPI.     OBJECTIVE:  /62   Pulse 67   Temp 97.5  F (36.4  C)   Resp 17   Ht 1.6 m (5' 3\")   Wt 63.9 kg (140 lb 14.4 oz)   SpO2 97%   BMI 24.96 kg/m      GENERAL: In no acute distress.  RESP:  Lungs clear.  No rales, rhonchi, or wheezing  CV:  Irregularly irregular.  S1 S2 with no murmur. No clicks or rubs.  ABD: Soft, non-tender, active BS, no rebound or guarding,   PSYCH:  Affect is flat.  NEURO: Alerts to voice. Answers questions, but otherwise does not follow commanges.   EXTREM:  No edema.    Lab/Diagnostic data:    Reviewed in Epic    ASSESSMENT/ORDERS:    Diagnoses and all orders for this visit:      Cerebrovascular accident (CVA) due to embolism of " posterior cerebral artery, unspecified blood vessel laterality (H) . Hemiparesis, aphasia, and dysphagia as late effects of stroke (H) / Dysphagia, unspecified type  Pt severely disabled after stroke in 2017. She is non ambulatory, does occasionally speak in short sentences/phrases per family, speech is unintelligible. Aphasia, dysphagia and cog impairment have been stable. Weights are variable, most recent  141 lbs - this is relatively stable from summer 2024, but down from admission.     Chronic a-fib (H)   On warfarin, followed by coumadin clinic. Last INR 2/28 was high at 3.7, has plan for repeat this week. Continue CCB, BB. Rate controlled- 70-80's upon facility record. Continue current therapy.     Partial symptomatic epilepsy with complex partial seizures, not intractable, without status epilepticus (H)  Stable, no recent seizures. Keppra level wnl  2/4/25. Ordered routine every 6 months.    Hypertension, unspecified type  Controlled. No changes in medications made.     Dementia with anxiety, unspecified dementia severity, unspecified dementia type / Anxiety  Mood slowly declining per family. Continue risperdal at current dosage, as taper resulted in significant worsening of anxiety per staff and family. Sertraline increased to 75mg in November 2024. Mood seemed to improve, continue to monitor.No further change in medication today.    Dyslipidemia  Continue statin given history of CVA. LDL 52, 2/4/25 continue.     Chronic systolic congestive heart failure (H)  EF 40% in 2018. Currently on metoprolol, no diuretic Euvolemic. Monitor.     Vitamin d deficiency  On supplement, normal level 2/4/25    Routine labs: CBC, BMP, keppra every 6 months    Total time spent with patient visit was 20 min including patient visit, review of pertinent clinical information, and treatment plan.    Selena Townsend MD

## 2025-03-07 ENCOUNTER — TRANSFERRED RECORDS (OUTPATIENT)
Dept: HEALTH INFORMATION MANAGEMENT | Facility: OTHER | Age: 85
End: 2025-03-07

## 2025-03-14 ENCOUNTER — TRANSFERRED RECORDS (OUTPATIENT)
Dept: HEALTH INFORMATION MANAGEMENT | Facility: OTHER | Age: 85
End: 2025-03-14

## 2025-03-21 ENCOUNTER — TRANSFERRED RECORDS (OUTPATIENT)
Dept: HEALTH INFORMATION MANAGEMENT | Facility: OTHER | Age: 85
End: 2025-03-21

## 2025-03-28 ENCOUNTER — MEDICAL CORRESPONDENCE (OUTPATIENT)
Dept: HEALTH INFORMATION MANAGEMENT | Facility: HOSPITAL | Age: 85
End: 2025-03-28

## 2025-04-04 ENCOUNTER — TRANSFERRED RECORDS (OUTPATIENT)
Dept: HEALTH INFORMATION MANAGEMENT | Facility: OTHER | Age: 85
End: 2025-04-04

## 2025-04-18 ENCOUNTER — TRANSFERRED RECORDS (OUTPATIENT)
Dept: HEALTH INFORMATION MANAGEMENT | Facility: OTHER | Age: 85
End: 2025-04-18

## 2025-05-02 ENCOUNTER — TRANSFERRED RECORDS (OUTPATIENT)
Dept: HEALTH INFORMATION MANAGEMENT | Facility: OTHER | Age: 85
End: 2025-05-02

## 2025-05-12 ENCOUNTER — TELEPHONE (OUTPATIENT)
Dept: GERIATRICS | Facility: OTHER | Age: 85
End: 2025-05-12

## 2025-05-13 ENCOUNTER — TELEPHONE (OUTPATIENT)
Dept: GERIATRICS | Facility: OTHER | Age: 85
End: 2025-05-13

## 2025-05-14 VITALS
DIASTOLIC BLOOD PRESSURE: 81 MMHG | RESPIRATION RATE: 18 BRPM | HEART RATE: 73 BPM | HEIGHT: 63 IN | TEMPERATURE: 97.9 F | BODY MASS INDEX: 25.76 KG/M2 | SYSTOLIC BLOOD PRESSURE: 130 MMHG | OXYGEN SATURATION: 95 % | WEIGHT: 145.4 LBS

## 2025-05-16 ENCOUNTER — TRANSFERRED RECORDS (OUTPATIENT)
Dept: HEALTH INFORMATION MANAGEMENT | Facility: OTHER | Age: 85
End: 2025-05-16

## 2025-05-16 ENCOUNTER — NURSING HOME VISIT (OUTPATIENT)
Dept: GERIATRICS | Facility: OTHER | Age: 85
End: 2025-05-16
Payer: COMMERCIAL

## 2025-05-16 DIAGNOSIS — E78.5 DYSLIPIDEMIA: ICD-10-CM

## 2025-05-16 DIAGNOSIS — I69.359 HEMIPARESIS AND SPEECH AND LANGUAGE DEFICIT AS LATE EFFECTS OF STROKE (H): ICD-10-CM

## 2025-05-16 DIAGNOSIS — I69.328 HEMIPARESIS AND SPEECH AND LANGUAGE DEFICIT AS LATE EFFECTS OF STROKE (H): ICD-10-CM

## 2025-05-16 DIAGNOSIS — L30.4 INTERTRIGO: ICD-10-CM

## 2025-05-16 DIAGNOSIS — R21 RASH: ICD-10-CM

## 2025-05-16 DIAGNOSIS — Z78.9 NURSING HOME RESIDENT: Primary | ICD-10-CM

## 2025-05-16 PROCEDURE — 99308 SBSQ NF CARE LOW MDM 20: CPT

## 2025-05-16 NOTE — PROGRESS NOTES
HISTORY OF PRESENT ILLNESS:      Danette is a 84 year old female (1940) resident of [facility] who is being seen today for a routine LTC visit.     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     Discussed with nursing staff who have the following concerns: 2 open areas under left breast. Treating with mupirocin, zinc, pillow case.     Patient is seen in their room. She is resting in bed. She alerts to voice. Speech is incomprehensible. She appears comfortable and does smile at our interaction.      Current medications, allergies, and interdisciplinary care plan are reviewed.    Patient Active Problem List    Diagnosis Date Noted    Anticoagulation monitoring, INR range 2-3 04/07/2023     Priority: Medium    Hospice care patient 01/07/2018     Priority: Medium    DNR (do not resuscitate) 04/03/2017     Priority: Medium    Physical deconditioning 04/03/2017     Priority: Medium    Delirium 03/03/2017     Priority: Medium    Chest pain 03/03/2017     Priority: Medium    Right leg pain 03/03/2017     Priority: Medium    Generalized weakness 11/01/2015     Priority: Medium    Hemiparesis and speech and language deficit as late effects of stroke (H) 11/01/2015     Priority: Medium    Orthostatic hypotension 05/18/2015     Priority: Medium    Anxiety 05/14/2015     Priority: Medium    Embolic stroke involving posterior cerebral artery (H) 05/11/2015     Priority: Medium    Complex partial seizure (H) 05/11/2015     Priority: Medium    TIA (transient ischemic attack) 05/10/2015     Priority: Medium    Dyslipidemia 11/03/2014     Priority: Medium    Hemiparesis, aphasia, and dysphagia as late effects of stroke (H) 11/03/2014     Priority: Medium    Hypertension 11/03/2014     Priority: Medium    Aphasia 09/16/2014     Priority: Medium    Apraxia 09/16/2014     Priority: Medium    Chronic a-fib (H) 09/16/2014     Priority: Medium    Dysphagia 09/16/2014      Priority: Medium    Hemineglect 09/16/2014     Priority: Medium    Orthostatic tremor 09/16/2014     Priority: Medium    Right hemiparesis (H) 09/16/2014     Priority: Medium    Hypokalemia 09/09/2014     Priority: Medium    Health care directive on file 10/31/2008     Priority: Medium          Social History     Socioeconomic History    Marital status:      Spouse name: Not on file    Number of children: Not on file    Years of education: Not on file    Highest education level: Not on file   Occupational History    Not on file   Tobacco Use    Smoking status: Unknown    Smokeless tobacco: Not on file   Substance and Sexual Activity    Alcohol use: Not Currently    Drug use: Never    Sexual activity: Not Currently   Other Topics Concern    Not on file   Social History Narrative    Not on file     Social Drivers of Health     Financial Resource Strain: Not on file   Food Insecurity: Not on file   Transportation Needs: Not on file   Physical Activity: Not on file   Stress: Not on file   Social Connections: Not on file   Interpersonal Safety: Not on file   Housing Stability: Not on file        Current Outpatient Medications   Medication Sig Dispense Refill    Acetaminophen (TYLENOL PO) Take 1,000 mg by mouth 2 times daily      ACETAMINOPHEN PO Take 650 mg by mouth every 6 hours as needed for pain      Atorvastatin Calcium (LIPITOR PO) Take 20 mg by mouth daily      diltiazem (CARDIZEM) 120 MG tablet Take 120 mg by mouth daily      ketoconazole (NIZORAL) 2 % external shampoo Twice weekly      levETIRAcetam (KEPPRA) 750 MG tablet Take 750 mg by mouth 2 times daily      magnesium hydroxide (MILK OF MAGNESIA) 400 MG/5ML suspension Take 30 mLs by mouth daily as needed PRN      metoprolol tartrate (LOPRESSOR) 25 MG tablet Take 1 tablet (25 mg) by mouth 2 times daily (Patient taking differently: Take 25 mg by mouth daily.) 60 tablet 0    risperiDONE (RISPERDAL) 0.5 MG tablet Take 0.125 mg by mouth daily       "senna-docusate (SENOKOT-S;PERICOLACE) 8.6-50 MG per tablet Take 1 tablet by mouth 3 times daily.      SERTRALINE HCL PO Take 75 mg by mouth daily.      Vitamin D, Cholecalciferol, 1000 units CAPS Take 2,000 Units by mouth daily (Patient taking differently: Take 1,000 Units by mouth daily.) 30 capsule 0    Warfarin Sodium (COUMADIN PO) Take 2.5 mg by mouth daily. Give 3.75 mg by mouth one time a day every Mon, Thu related to CHRONIC ATRIAL FIBRILLATION, UNSPECIFIED (I48.20) recheck INR 3/7/25 AND Give 2.5 mg by mouth one time a day every Tue, Wed, Fri, Sat, Sun related to CHRONIC ATRIAL FIBRILLATION, UNSPECIFIED (I48.20) Recheck INR 3/7/25       No current facility-administered medications for this visit.       Allergies   Allergen Reactions    Bacitracin Rash     Contact dermatitis       I have reviewed the care plan and do agree with the plan.    ROS:  Unable to obtain due to pt factors. See HPI.     OBJECTIVE:  /81   Pulse 73   Temp 97.9  F (36.6  C)   Resp 18   Ht 1.6 m (5' 3\")   Wt 66 kg (145 lb 6.4 oz)   SpO2 95%   BMI 25.76 kg/m      GENERAL: In no acute distress.  RESP:  Lungs clear.  No rales, rhonchi, or wheezing  CV:  Irregularly irregular.  S1 S2 with no murmur. No clicks or rubs.  ABD: Soft, non-tender, active BS, no rebound or guarding,   PSYCH:  Affect is flat.  NEURO: Alerts to voice. Does not follow commands.  EXTREM:  No edema.  SKIN: Erythematous patch under left breast with 2 small open areas. No surrounding erythema or warmth.    Lab/Diagnostic data:    Reviewed in Epic    ASSESSMENT/ORDERS:    Diagnoses and all orders for this visit:      Cerebrovascular accident (CVA) due to embolism of posterior cerebral artery, unspecified blood vessel laterality (H) . Hemiparesis, aphasia, and dysphagia as late effects of stroke (H) / Dysphagia, unspecified type  Pt severely disabled after stroke in 2017. She is non ambulatory, does occasionally speak in short sentences/phrases per family, " speech is typically unintelligible. Aphasia, dysphagia and cog impairment have been stable. Weights are variable, most recent  145 lbs - this is relatively stable from Sept. 2024.    Chronic a-fib (H)   On warfarin, followed by coumadin clinic. IINR 1.9 today. Continue CCB, BB. Rate controlled- 70-80's upon facility record. Continue current therapy.     Partial symptomatic epilepsy with complex partial seizures, not intractable, without status epilepticus (H)  Stable, no recent seizures. Keppra level wnl 2/4. Ordered routine every 6 months.    Hypertension, unspecified type  Controlled. -120's.    Dementia with anxiety, unspecified dementia severity, unspecified dementia type / Anxiety  Mood slowly declining per family. Continue risperdal at current dosage, as taper resulted in significant worsening of anxiety per staff and family. Sertraline increased to 75mg in November. Discussed with daughter at last visit with possible benefit. Continue at this dose.     Dyslipidemia  Continue statin given history of CVA. Total chol 104, LDL 52 on 2/4.    Chronic systolic congestive heart failure (H)  Not on diuretics. EF 40% in 2018. Currently on metoprolol. Euvolemic. Monitor.     Vitamin d deficiency  On supplement. Vit d of 67 on 2/4. Continue.    Intertrigo/Rash  Unable to update images in chart today. Continue plan of care per wound care team at facility. Mupirocin, zinc ointment currently ordered as of 5/13, with improvement per staff.    Routine labs: CBC, BMP, keppra every 6 months    Total time spent with patient visit was 20 min including patient visit, review of pertinent clinical information, and treatment plan.    Janeth Larkin, CNP

## 2025-05-30 ENCOUNTER — TRANSFERRED RECORDS (OUTPATIENT)
Dept: HEALTH INFORMATION MANAGEMENT | Facility: OTHER | Age: 85
End: 2025-05-30

## 2025-06-13 ENCOUNTER — TRANSFERRED RECORDS (OUTPATIENT)
Dept: HEALTH INFORMATION MANAGEMENT | Facility: OTHER | Age: 85
End: 2025-06-13

## 2025-06-27 ENCOUNTER — TRANSFERRED RECORDS (OUTPATIENT)
Dept: HEALTH INFORMATION MANAGEMENT | Facility: OTHER | Age: 85
End: 2025-06-27

## 2025-07-11 ENCOUNTER — TRANSFERRED RECORDS (OUTPATIENT)
Dept: HEALTH INFORMATION MANAGEMENT | Facility: OTHER | Age: 85
End: 2025-07-11

## 2025-07-22 ENCOUNTER — TELEPHONE (OUTPATIENT)
Dept: GERIATRICS | Facility: OTHER | Age: 85
End: 2025-07-22

## 2025-07-22 DIAGNOSIS — Z86.73 HISTORY OF CVA (CEREBROVASCULAR ACCIDENT): ICD-10-CM

## 2025-07-22 DIAGNOSIS — I48.20 CHRONIC ATRIAL FIBRILLATION (H): Primary | ICD-10-CM

## 2025-07-22 NOTE — TELEPHONE ENCOUNTER
Orders and Notification sent to Nursing home.  Cuca Sahni, [facility]

## 2025-07-22 NOTE — TELEPHONE ENCOUNTER
May discontinue the coumadin and start eliquis. I have sent in 5 mg dose BID. Notify the coumadin clinic if they would like any further instruction.    Please get updated weight to ensure she is over 60 kg. If <60, her dose should be reduced to 2.5 mg BID.     Can we also get updated routine labs?     ROBERTO CARLOS Espinosa CNP on 7/22/2025 at 2:11 PM

## 2025-07-23 ENCOUNTER — ANTICOAGULATION THERAPY VISIT (OUTPATIENT)
Dept: ANTICOAGULATION | Facility: OTHER | Age: 85
End: 2025-07-23
Payer: COMMERCIAL

## 2025-07-23 DIAGNOSIS — I48.20 CHRONIC A-FIB (H): ICD-10-CM

## 2025-07-23 DIAGNOSIS — Z79.01 ANTICOAGULATION MONITORING, INR RANGE 2-3: Primary | ICD-10-CM

## 2025-07-23 DIAGNOSIS — G45.9 TIA (TRANSIENT ISCHEMIC ATTACK): ICD-10-CM

## 2025-07-24 NOTE — PROGRESS NOTES
ANTICOAGULATION  MANAGEMENT    Danette Ramos is being discharged from the [facility] ([facility]).    Reason for discharge: warfarin replaced by alternate therapy, apixaban    Anticoagulation episode resolved    If patient needs warfarin management in the future, please send a new referral    Ese Beckett RN

## 2025-08-18 VITALS
HEART RATE: 71 BPM | OXYGEN SATURATION: 97 % | RESPIRATION RATE: 17 BRPM | DIASTOLIC BLOOD PRESSURE: 75 MMHG | HEIGHT: 63 IN | BODY MASS INDEX: 26.33 KG/M2 | WEIGHT: 148.6 LBS | TEMPERATURE: 97.7 F | SYSTOLIC BLOOD PRESSURE: 125 MMHG

## 2025-08-20 ENCOUNTER — NURSING HOME VISIT (OUTPATIENT)
Dept: GERIATRICS | Facility: OTHER | Age: 85
End: 2025-08-20
Payer: COMMERCIAL

## 2025-08-20 DIAGNOSIS — Z78.9 NURSING HOME RESIDENT: Primary | ICD-10-CM

## 2025-08-25 ENCOUNTER — TELEPHONE (OUTPATIENT)
Dept: GERIATRICS | Facility: OTHER | Age: 85
End: 2025-08-25